# Patient Record
Sex: FEMALE | Race: WHITE | URBAN - METROPOLITAN AREA
[De-identification: names, ages, dates, MRNs, and addresses within clinical notes are randomized per-mention and may not be internally consistent; named-entity substitution may affect disease eponyms.]

---

## 2017-03-29 ENCOUNTER — ANESTHESIA (OUTPATIENT)
Dept: SURGERY | Facility: CLINIC | Age: 37
DRG: 853 | End: 2017-03-29
Payer: COMMERCIAL

## 2017-03-29 ENCOUNTER — ANESTHESIA EVENT (OUTPATIENT)
Dept: SURGERY | Facility: CLINIC | Age: 37
DRG: 853 | End: 2017-03-29
Payer: COMMERCIAL

## 2017-03-29 ENCOUNTER — HOSPITAL ENCOUNTER (INPATIENT)
Facility: CLINIC | Age: 37
LOS: 15 days | Discharge: HOME | DRG: 853 | End: 2017-04-13
Attending: EMERGENCY MEDICINE | Admitting: SURGERY
Payer: COMMERCIAL

## 2017-03-29 ENCOUNTER — APPOINTMENT (OUTPATIENT)
Dept: CT IMAGING | Facility: CLINIC | Age: 37
DRG: 853 | End: 2017-03-29
Attending: EMERGENCY MEDICINE
Payer: COMMERCIAL

## 2017-03-29 ENCOUNTER — APPOINTMENT (OUTPATIENT)
Dept: GENERAL RADIOLOGY | Facility: CLINIC | Age: 37
DRG: 853 | End: 2017-03-29
Attending: ANESTHESIOLOGY
Payer: COMMERCIAL

## 2017-03-29 DIAGNOSIS — E11.9 TYPE 2 DIABETES MELLITUS WITHOUT COMPLICATION, WITH LONG-TERM CURRENT USE OF INSULIN (H): ICD-10-CM

## 2017-03-29 DIAGNOSIS — M72.6 NECROTIZING FASCIITIS (H): ICD-10-CM

## 2017-03-29 DIAGNOSIS — R73.9 HYPERGLYCEMIA: ICD-10-CM

## 2017-03-29 DIAGNOSIS — M79.89 NECROTIZING SOFT TISSUE INFECTION: Primary | ICD-10-CM

## 2017-03-29 DIAGNOSIS — I10 ESSENTIAL HYPERTENSION: ICD-10-CM

## 2017-03-29 DIAGNOSIS — Z79.4 TYPE 2 DIABETES MELLITUS WITHOUT COMPLICATION, WITH LONG-TERM CURRENT USE OF INSULIN (H): ICD-10-CM

## 2017-03-29 LAB
ALBUMIN SERPL-MCNC: 1.7 G/DL (ref 3.4–5)
ALBUMIN SERPL-MCNC: 2.3 G/DL (ref 3.4–5)
ALP SERPL-CCNC: 57 U/L (ref 40–150)
ALP SERPL-CCNC: 83 U/L (ref 40–150)
ALT SERPL W P-5'-P-CCNC: 11 U/L (ref 0–50)
ALT SERPL W P-5'-P-CCNC: 18 U/L (ref 0–50)
ANION GAP SERPL CALCULATED.3IONS-SCNC: 7 MMOL/L (ref 3–14)
ANION GAP SERPL CALCULATED.3IONS-SCNC: 8 MMOL/L (ref 3–14)
ANION GAP SERPL CALCULATED.3IONS-SCNC: 9 MMOL/L (ref 3–14)
ANION GAP SERPL CALCULATED.3IONS-SCNC: 9 MMOL/L (ref 3–14)
APTT PPP: 28 SEC (ref 22–37)
AST SERPL W P-5'-P-CCNC: 10 U/L (ref 0–45)
AST SERPL W P-5'-P-CCNC: 8 U/L (ref 0–45)
BASE DEFICIT BLDA-SCNC: 2.4 MMOL/L
BASE DEFICIT BLDV-SCNC: 1 MMOL/L
BASOPHILS # BLD AUTO: 0 10E9/L (ref 0–0.2)
BASOPHILS NFR BLD AUTO: 0.1 %
BILIRUB SERPL-MCNC: 0.7 MG/DL (ref 0.2–1.3)
BILIRUB SERPL-MCNC: 0.9 MG/DL (ref 0.2–1.3)
BUN SERPL-MCNC: 3 MG/DL (ref 7–30)
BUN SERPL-MCNC: 4 MG/DL (ref 7–30)
BUN SERPL-MCNC: 5 MG/DL (ref 7–30)
BUN SERPL-MCNC: 6 MG/DL (ref 7–30)
CA-I BLD-MCNC: 4.1 MG/DL (ref 4.4–5.2)
CA-I BLD-MCNC: 4.2 MG/DL (ref 4.4–5.2)
CALCIUM SERPL-MCNC: 6.7 MG/DL (ref 8.5–10.1)
CALCIUM SERPL-MCNC: 6.8 MG/DL (ref 8.5–10.1)
CALCIUM SERPL-MCNC: 7 MG/DL (ref 8.5–10.1)
CALCIUM SERPL-MCNC: 7.9 MG/DL (ref 8.5–10.1)
CHLORIDE SERPL-SCNC: 105 MMOL/L (ref 94–109)
CHLORIDE SERPL-SCNC: 108 MMOL/L (ref 94–109)
CHLORIDE SERPL-SCNC: 114 MMOL/L (ref 94–109)
CHLORIDE SERPL-SCNC: 95 MMOL/L (ref 94–109)
CO2 SERPL-SCNC: 23 MMOL/L (ref 20–32)
CO2 SERPL-SCNC: 23 MMOL/L (ref 20–32)
CO2 SERPL-SCNC: 24 MMOL/L (ref 20–32)
CO2 SERPL-SCNC: 26 MMOL/L (ref 20–32)
CREAT SERPL-MCNC: 0.48 MG/DL (ref 0.52–1.04)
CREAT SERPL-MCNC: 0.52 MG/DL (ref 0.52–1.04)
CREAT SERPL-MCNC: 0.58 MG/DL (ref 0.52–1.04)
CREAT SERPL-MCNC: 0.66 MG/DL (ref 0.52–1.04)
DIFFERENTIAL METHOD BLD: ABNORMAL
EOSINOPHIL # BLD AUTO: 0.1 10E9/L (ref 0–0.7)
EOSINOPHIL NFR BLD AUTO: 0.3 %
ERYTHROCYTE [DISTWIDTH] IN BLOOD BY AUTOMATED COUNT: 12.7 % (ref 10–15)
ERYTHROCYTE [DISTWIDTH] IN BLOOD BY AUTOMATED COUNT: 12.8 % (ref 10–15)
ERYTHROCYTE [DISTWIDTH] IN BLOOD BY AUTOMATED COUNT: 12.8 % (ref 10–15)
ERYTHROCYTE [DISTWIDTH] IN BLOOD BY AUTOMATED COUNT: 13 % (ref 10–15)
FIBRINOGEN PPP-MCNC: 688 MG/DL (ref 200–420)
GFR SERPL CREATININE-BSD FRML MDRD: ABNORMAL ML/MIN/1.7M2
GLUCOSE BLDC GLUCOMTR-MCNC: 176 MG/DL (ref 70–99)
GLUCOSE BLDC GLUCOMTR-MCNC: 215 MG/DL (ref 70–99)
GLUCOSE BLDC GLUCOMTR-MCNC: 225 MG/DL (ref 70–99)
GLUCOSE BLDC GLUCOMTR-MCNC: 244 MG/DL (ref 70–99)
GLUCOSE BLDC GLUCOMTR-MCNC: 267 MG/DL (ref 70–99)
GLUCOSE SERPL-MCNC: 166 MG/DL (ref 70–99)
GLUCOSE SERPL-MCNC: 213 MG/DL (ref 70–99)
GLUCOSE SERPL-MCNC: 218 MG/DL (ref 70–99)
GLUCOSE SERPL-MCNC: 356 MG/DL (ref 70–99)
GRAM STN SPEC: ABNORMAL
HBA1C MFR BLD: 9.6 % (ref 4.3–6)
HCG SERPL QL: NEGATIVE
HCO3 BLD-SCNC: 23 MMOL/L (ref 21–28)
HCO3 BLDV-SCNC: 25 MMOL/L (ref 21–28)
HCT VFR BLD AUTO: 31.5 % (ref 35–47)
HCT VFR BLD AUTO: 32.1 % (ref 35–47)
HCT VFR BLD AUTO: 32.9 % (ref 35–47)
HCT VFR BLD AUTO: 39.6 % (ref 35–47)
HGB BLD-MCNC: 10.8 G/DL (ref 11.7–15.7)
HGB BLD-MCNC: 10.9 G/DL (ref 11.7–15.7)
HGB BLD-MCNC: 11.4 G/DL (ref 11.7–15.7)
HGB BLD-MCNC: 13.9 G/DL (ref 11.7–15.7)
IMM GRANULOCYTES # BLD: 0.1 10E9/L (ref 0–0.4)
IMM GRANULOCYTES NFR BLD: 0.5 %
INR PPP: 1.29 (ref 0.86–1.14)
LACTATE BLD-SCNC: 0.8 MMOL/L (ref 0.7–2.1)
LACTATE BLD-SCNC: 1 MMOL/L (ref 0.7–2.1)
LACTATE BLD-SCNC: 1.9 MMOL/L (ref 0.7–2.1)
LACTATE SERPL-SCNC: 1.3 MMOL/L (ref 0.4–2)
LYMPHOCYTES # BLD AUTO: 1.8 10E9/L (ref 0.8–5.3)
LYMPHOCYTES NFR BLD AUTO: 10.6 %
Lab: ABNORMAL
MAGNESIUM SERPL-MCNC: 1.8 MG/DL (ref 1.6–2.3)
MCH RBC QN AUTO: 28.5 PG (ref 26.5–33)
MCH RBC QN AUTO: 28.6 PG (ref 26.5–33)
MCH RBC QN AUTO: 28.7 PG (ref 26.5–33)
MCH RBC QN AUTO: 28.9 PG (ref 26.5–33)
MCHC RBC AUTO-ENTMCNC: 34 G/DL (ref 31.5–36.5)
MCHC RBC AUTO-ENTMCNC: 34.3 G/DL (ref 31.5–36.5)
MCHC RBC AUTO-ENTMCNC: 34.7 G/DL (ref 31.5–36.5)
MCHC RBC AUTO-ENTMCNC: 35.1 G/DL (ref 31.5–36.5)
MCV RBC AUTO: 82 FL (ref 78–100)
MCV RBC AUTO: 83 FL (ref 78–100)
MCV RBC AUTO: 83 FL (ref 78–100)
MCV RBC AUTO: 84 FL (ref 78–100)
MICRO REPORT STATUS: ABNORMAL
MONOCYTES # BLD AUTO: 1.4 10E9/L (ref 0–1.3)
MONOCYTES NFR BLD AUTO: 7.8 %
NEUTROPHILS # BLD AUTO: 13.9 10E9/L (ref 1.6–8.3)
NEUTROPHILS NFR BLD AUTO: 80.7 %
NRBC # BLD AUTO: 0 10*3/UL
NRBC BLD AUTO-RTO: 0 /100
O2/TOTAL GAS SETTING VFR VENT: ABNORMAL %
OXYHGB MFR BLD: 94 % (ref 92–100)
OXYHGB MFR BLDV: 79 %
PCO2 BLD: 41 MM HG (ref 35–45)
PCO2 BLDV: 47 MM HG (ref 40–50)
PH BLD: 7.36 PH (ref 7.35–7.45)
PH BLDV: 7.33 PH (ref 7.32–7.43)
PHOSPHATE SERPL-MCNC: 2.7 MG/DL (ref 2.5–4.5)
PLATELET # BLD AUTO: 244 10E9/L (ref 150–450)
PLATELET # BLD AUTO: 259 10E9/L (ref 150–450)
PLATELET # BLD AUTO: 276 10E9/L (ref 150–450)
PLATELET # BLD AUTO: 279 10E9/L (ref 150–450)
PO2 BLD: 79 MM HG (ref 80–105)
PO2 BLDV: 47 MM HG (ref 25–47)
POTASSIUM SERPL-SCNC: 3.2 MMOL/L (ref 3.4–5.3)
POTASSIUM SERPL-SCNC: 3.5 MMOL/L (ref 3.4–5.3)
POTASSIUM SERPL-SCNC: 3.9 MMOL/L (ref 3.4–5.3)
POTASSIUM SERPL-SCNC: 4.1 MMOL/L (ref 3.4–5.3)
PROT SERPL-MCNC: 5.3 G/DL (ref 6.8–8.8)
PROT SERPL-MCNC: 7.1 G/DL (ref 6.8–8.8)
RBC # BLD AUTO: 3.78 10E12/L (ref 3.8–5.2)
RBC # BLD AUTO: 3.83 10E12/L (ref 3.8–5.2)
RBC # BLD AUTO: 3.95 10E12/L (ref 3.8–5.2)
RBC # BLD AUTO: 4.84 10E12/L (ref 3.8–5.2)
SODIUM SERPL-SCNC: 130 MMOL/L (ref 133–144)
SODIUM SERPL-SCNC: 137 MMOL/L (ref 133–144)
SODIUM SERPL-SCNC: 140 MMOL/L (ref 133–144)
SODIUM SERPL-SCNC: 144 MMOL/L (ref 133–144)
SPECIMEN SOURCE: ABNORMAL
WBC # BLD AUTO: 17.2 10E9/L (ref 4–11)
WBC # BLD AUTO: 17.3 10E9/L (ref 4–11)
WBC # BLD AUTO: 17.6 10E9/L (ref 4–11)
WBC # BLD AUTO: 20.6 10E9/L (ref 4–11)

## 2017-03-29 PROCEDURE — 25000125 ZZHC RX 250: Performed by: ANESTHESIOLOGY

## 2017-03-29 PROCEDURE — 96365 THER/PROPH/DIAG IV INF INIT: CPT | Mod: 59

## 2017-03-29 PROCEDURE — 40000169 ZZH STATISTIC PRE-PROCEDURE ASSESSMENT I: Performed by: SURGERY

## 2017-03-29 PROCEDURE — 96368 THER/DIAG CONCURRENT INF: CPT

## 2017-03-29 PROCEDURE — S0077 INJECTION, CLINDAMYCIN PHOSP: HCPCS | Performed by: EMERGENCY MEDICINE

## 2017-03-29 PROCEDURE — 25000128 H RX IP 250 OP 636: Performed by: NURSE ANESTHETIST, CERTIFIED REGISTERED

## 2017-03-29 PROCEDURE — 82330 ASSAY OF CALCIUM: CPT | Performed by: PHYSICIAN ASSISTANT

## 2017-03-29 PROCEDURE — 3E043XZ INTRODUCTION OF VASOPRESSOR INTO CENTRAL VEIN, PERCUTANEOUS APPROACH: ICD-10-PCS | Performed by: PHYSICIAN ASSISTANT

## 2017-03-29 PROCEDURE — 25000131 ZZH RX MED GY IP 250 OP 636 PS 637: Performed by: NURSE ANESTHETIST, CERTIFIED REGISTERED

## 2017-03-29 PROCEDURE — 40000940 XR CHEST PORT 1 VW

## 2017-03-29 PROCEDURE — 87077 CULTURE AEROBIC IDENTIFY: CPT | Performed by: SURGERY

## 2017-03-29 PROCEDURE — 25000128 H RX IP 250 OP 636: Performed by: PHYSICIAN ASSISTANT

## 2017-03-29 PROCEDURE — 85384 FIBRINOGEN ACTIVITY: CPT | Performed by: ANESTHESIOLOGY

## 2017-03-29 PROCEDURE — 72193 CT PELVIS W/DYE: CPT

## 2017-03-29 PROCEDURE — 83605 ASSAY OF LACTIC ACID: CPT | Performed by: EMERGENCY MEDICINE

## 2017-03-29 PROCEDURE — 27210794 ZZH OR GENERAL SUPPLY STERILE: Performed by: SURGERY

## 2017-03-29 PROCEDURE — 82533 TOTAL CORTISOL: CPT | Performed by: PHYSICIAN ASSISTANT

## 2017-03-29 PROCEDURE — 80053 COMPREHEN METABOLIC PANEL: CPT | Performed by: ANESTHESIOLOGY

## 2017-03-29 PROCEDURE — 84100 ASSAY OF PHOSPHORUS: CPT | Performed by: ANESTHESIOLOGY

## 2017-03-29 PROCEDURE — 40000008 ZZH STATISTIC AIRWAY CARE

## 2017-03-29 PROCEDURE — 80048 BASIC METABOLIC PNL TOTAL CA: CPT | Performed by: SURGERY

## 2017-03-29 PROCEDURE — 25000125 ZZHC RX 250: Performed by: PHYSICIAN ASSISTANT

## 2017-03-29 PROCEDURE — S0077 INJECTION, CLINDAMYCIN PHOSP: HCPCS | Performed by: SURGERY

## 2017-03-29 PROCEDURE — 40000275 ZZH STATISTIC RCP TIME EA 10 MIN

## 2017-03-29 PROCEDURE — 83605 ASSAY OF LACTIC ACID: CPT | Performed by: ANESTHESIOLOGY

## 2017-03-29 PROCEDURE — 25000132 ZZH RX MED GY IP 250 OP 250 PS 637: Performed by: PHYSICIAN ASSISTANT

## 2017-03-29 PROCEDURE — 25000131 ZZH RX MED GY IP 250 OP 636 PS 637: Performed by: PHYSICIAN ASSISTANT

## 2017-03-29 PROCEDURE — 36000052 ZZH SURGERY LEVEL 2 EA 15 ADDTL MIN: Performed by: SURGERY

## 2017-03-29 PROCEDURE — 25000125 ZZHC RX 250: Performed by: SURGERY

## 2017-03-29 PROCEDURE — P9041 ALBUMIN (HUMAN),5%, 50ML: HCPCS | Performed by: NURSE ANESTHETIST, CERTIFIED REGISTERED

## 2017-03-29 PROCEDURE — 85027 COMPLETE CBC AUTOMATED: CPT | Performed by: ANESTHESIOLOGY

## 2017-03-29 PROCEDURE — 25000125 ZZHC RX 250: Performed by: NURSE ANESTHETIST, CERTIFIED REGISTERED

## 2017-03-29 PROCEDURE — 87185 SC STD ENZYME DETCJ PER NZM: CPT | Performed by: SURGERY

## 2017-03-29 PROCEDURE — 25000125 ZZHC RX 250

## 2017-03-29 PROCEDURE — 87102 FUNGUS ISOLATION CULTURE: CPT | Performed by: SURGERY

## 2017-03-29 PROCEDURE — 37000009 ZZH ANESTHESIA TECHNICAL FEE, EACH ADDTL 15 MIN: Performed by: SURGERY

## 2017-03-29 PROCEDURE — 25000566 ZZH SEVOFLURANE, EA 15 MIN: Performed by: SURGERY

## 2017-03-29 PROCEDURE — 25000128 H RX IP 250 OP 636

## 2017-03-29 PROCEDURE — 00000146 ZZHCL STATISTIC GLUCOSE BY METER IP

## 2017-03-29 PROCEDURE — 87070 CULTURE OTHR SPECIMN AEROBIC: CPT | Performed by: SURGERY

## 2017-03-29 PROCEDURE — 87075 CULTR BACTERIA EXCEPT BLOOD: CPT | Performed by: SURGERY

## 2017-03-29 PROCEDURE — 37000008 ZZH ANESTHESIA TECHNICAL FEE, 1ST 30 MIN: Performed by: SURGERY

## 2017-03-29 PROCEDURE — 87641 MR-STAPH DNA AMP PROBE: CPT | Performed by: PHYSICIAN ASSISTANT

## 2017-03-29 PROCEDURE — 25000128 H RX IP 250 OP 636: Performed by: SURGERY

## 2017-03-29 PROCEDURE — 85610 PROTHROMBIN TIME: CPT | Performed by: ANESTHESIOLOGY

## 2017-03-29 PROCEDURE — 96375 TX/PRO/DX INJ NEW DRUG ADDON: CPT

## 2017-03-29 PROCEDURE — 96361 HYDRATE IV INFUSION ADD-ON: CPT

## 2017-03-29 PROCEDURE — 36000050 ZZH SURGERY LEVEL 2 1ST 30 MIN: Performed by: SURGERY

## 2017-03-29 PROCEDURE — 25000128 H RX IP 250 OP 636: Performed by: EMERGENCY MEDICINE

## 2017-03-29 PROCEDURE — 99207 ZZC NON-BILLABLE SERV PER CHARTING: CPT | Performed by: PHYSICIAN ASSISTANT

## 2017-03-29 PROCEDURE — 71000013 ZZH RECOVERY PHASE 1 LEVEL 1 EA ADDTL HR: Performed by: SURGERY

## 2017-03-29 PROCEDURE — 25500064 ZZH RX 255 OP 636: Performed by: EMERGENCY MEDICINE

## 2017-03-29 PROCEDURE — 96376 TX/PRO/DX INJ SAME DRUG ADON: CPT

## 2017-03-29 PROCEDURE — 82805 BLOOD GASES W/O2 SATURATION: CPT | Performed by: ANESTHESIOLOGY

## 2017-03-29 PROCEDURE — 25000132 ZZH RX MED GY IP 250 OP 250 PS 637: Performed by: NURSE ANESTHETIST, CERTIFIED REGISTERED

## 2017-03-29 PROCEDURE — 83735 ASSAY OF MAGNESIUM: CPT | Performed by: ANESTHESIOLOGY

## 2017-03-29 PROCEDURE — 36415 COLL VENOUS BLD VENIPUNCTURE: CPT

## 2017-03-29 PROCEDURE — 96366 THER/PROPH/DIAG IV INF ADDON: CPT

## 2017-03-29 PROCEDURE — 80053 COMPREHEN METABOLIC PANEL: CPT | Performed by: EMERGENCY MEDICINE

## 2017-03-29 PROCEDURE — 27210995 ZZH RX 272: Performed by: SURGERY

## 2017-03-29 PROCEDURE — 82805 BLOOD GASES W/O2 SATURATION: CPT | Performed by: PHYSICIAN ASSISTANT

## 2017-03-29 PROCEDURE — 85730 THROMBOPLASTIN TIME PARTIAL: CPT | Performed by: ANESTHESIOLOGY

## 2017-03-29 PROCEDURE — 85027 COMPLETE CBC AUTOMATED: CPT | Performed by: PHYSICIAN ASSISTANT

## 2017-03-29 PROCEDURE — 99285 EMERGENCY DEPT VISIT HI MDM: CPT | Mod: 25

## 2017-03-29 PROCEDURE — 0JBL0ZZ EXCISION OF RIGHT UPPER LEG SUBCUTANEOUS TISSUE AND FASCIA, OPEN APPROACH: ICD-10-PCS | Performed by: SURGERY

## 2017-03-29 PROCEDURE — 87040 BLOOD CULTURE FOR BACTERIA: CPT | Performed by: EMERGENCY MEDICINE

## 2017-03-29 PROCEDURE — 87640 STAPH A DNA AMP PROBE: CPT | Performed by: PHYSICIAN ASSISTANT

## 2017-03-29 PROCEDURE — 25800025 ZZH RX 258: Performed by: ANESTHESIOLOGY

## 2017-03-29 PROCEDURE — 5A1955Z RESPIRATORY VENTILATION, GREATER THAN 96 CONSECUTIVE HOURS: ICD-10-PCS | Performed by: SURGERY

## 2017-03-29 PROCEDURE — 87176 TISSUE HOMOGENIZATION CULTR: CPT | Performed by: SURGERY

## 2017-03-29 PROCEDURE — 83036 HEMOGLOBIN GLYCOSYLATED A1C: CPT | Performed by: EMERGENCY MEDICINE

## 2017-03-29 PROCEDURE — 20000003 ZZH R&B ICU

## 2017-03-29 PROCEDURE — 87076 CULTURE ANAEROBE IDENT EACH: CPT | Performed by: SURGERY

## 2017-03-29 PROCEDURE — 87205 SMEAR GRAM STAIN: CPT | Performed by: SURGERY

## 2017-03-29 PROCEDURE — 99291 CRITICAL CARE FIRST HOUR: CPT | Performed by: PHYSICIAN ASSISTANT

## 2017-03-29 PROCEDURE — 84703 CHORIONIC GONADOTROPIN ASSAY: CPT | Performed by: EMERGENCY MEDICINE

## 2017-03-29 PROCEDURE — 11005 DBRDMT SKIN ABDOMINAL WALL: CPT | Performed by: SURGERY

## 2017-03-29 PROCEDURE — 85025 COMPLETE CBC W/AUTO DIFF WBC: CPT | Performed by: EMERGENCY MEDICINE

## 2017-03-29 PROCEDURE — 25000125 ZZHC RX 250: Performed by: EMERGENCY MEDICINE

## 2017-03-29 PROCEDURE — 36415 COLL VENOUS BLD VENIPUNCTURE: CPT | Performed by: PHYSICIAN ASSISTANT

## 2017-03-29 PROCEDURE — 83605 ASSAY OF LACTIC ACID: CPT | Performed by: PHYSICIAN ASSISTANT

## 2017-03-29 PROCEDURE — 71000012 ZZH RECOVERY PHASE 1 LEVEL 1 FIRST HR: Performed by: SURGERY

## 2017-03-29 PROCEDURE — 25800025 ZZH RX 258: Performed by: NURSE ANESTHETIST, CERTIFIED REGISTERED

## 2017-03-29 PROCEDURE — 94002 VENT MGMT INPAT INIT DAY: CPT

## 2017-03-29 PROCEDURE — 80048 BASIC METABOLIC PNL TOTAL CA: CPT | Performed by: PHYSICIAN ASSISTANT

## 2017-03-29 RX ORDER — SODIUM CHLORIDE, SODIUM LACTATE, POTASSIUM CHLORIDE, CALCIUM CHLORIDE 600; 310; 30; 20 MG/100ML; MG/100ML; MG/100ML; MG/100ML
INJECTION, SOLUTION INTRAVENOUS CONTINUOUS
Status: DISCONTINUED | OUTPATIENT
Start: 2017-03-29 | End: 2017-03-29

## 2017-03-29 RX ORDER — FENTANYL CITRATE 50 UG/ML
25-50 INJECTION, SOLUTION INTRAMUSCULAR; INTRAVENOUS
Status: DISCONTINUED | OUTPATIENT
Start: 2017-03-29 | End: 2017-03-29 | Stop reason: ALTCHOICE

## 2017-03-29 RX ORDER — CLINDAMYCIN PHOSPHATE 900 MG/50ML
900 INJECTION, SOLUTION INTRAVENOUS EVERY 8 HOURS
Status: DISCONTINUED | OUTPATIENT
Start: 2017-03-29 | End: 2017-03-29

## 2017-03-29 RX ORDER — FENTANYL CITRATE 0.05 MG/ML
25-50 INJECTION, SOLUTION INTRAMUSCULAR; INTRAVENOUS
Status: DISCONTINUED | OUTPATIENT
Start: 2017-03-29 | End: 2017-03-29 | Stop reason: HOSPADM

## 2017-03-29 RX ORDER — PROPOFOL 10 MG/ML
INJECTION, EMULSION INTRAVENOUS PRN
Status: DISCONTINUED | OUTPATIENT
Start: 2017-03-29 | End: 2017-03-29

## 2017-03-29 RX ORDER — PIPERACILLIN SODIUM, TAZOBACTAM SODIUM 3; .375 G/15ML; G/15ML
3.38 INJECTION, POWDER, LYOPHILIZED, FOR SOLUTION INTRAVENOUS EVERY 6 HOURS
Status: DISCONTINUED | OUTPATIENT
Start: 2017-03-29 | End: 2017-03-29

## 2017-03-29 RX ORDER — MAGNESIUM SULFATE HEPTAHYDRATE 40 MG/ML
4 INJECTION, SOLUTION INTRAVENOUS EVERY 4 HOURS PRN
Status: DISCONTINUED | OUTPATIENT
Start: 2017-03-29 | End: 2017-04-13 | Stop reason: HOSPADM

## 2017-03-29 RX ORDER — ETOMIDATE 2 MG/ML
INJECTION INTRAVENOUS PRN
Status: DISCONTINUED | OUTPATIENT
Start: 2017-03-29 | End: 2017-03-29

## 2017-03-29 RX ORDER — ONDANSETRON 4 MG/1
4 TABLET, ORALLY DISINTEGRATING ORAL EVERY 6 HOURS PRN
Status: DISCONTINUED | OUTPATIENT
Start: 2017-03-29 | End: 2017-04-13 | Stop reason: HOSPADM

## 2017-03-29 RX ORDER — SODIUM CHLORIDE, SODIUM LACTATE, POTASSIUM CHLORIDE, CALCIUM CHLORIDE 600; 310; 30; 20 MG/100ML; MG/100ML; MG/100ML; MG/100ML
INJECTION, SOLUTION INTRAVENOUS CONTINUOUS
Status: DISCONTINUED | OUTPATIENT
Start: 2017-03-29 | End: 2017-03-29 | Stop reason: HOSPADM

## 2017-03-29 RX ORDER — SODIUM CHLORIDE, SODIUM LACTATE, POTASSIUM CHLORIDE, CALCIUM CHLORIDE 600; 310; 30; 20 MG/100ML; MG/100ML; MG/100ML; MG/100ML
INJECTION, SOLUTION INTRAVENOUS CONTINUOUS PRN
Status: DISCONTINUED | OUTPATIENT
Start: 2017-03-29 | End: 2017-03-29

## 2017-03-29 RX ORDER — PROCHLORPERAZINE MALEATE 5 MG
5-10 TABLET ORAL EVERY 6 HOURS PRN
Status: DISCONTINUED | OUTPATIENT
Start: 2017-03-29 | End: 2017-04-13 | Stop reason: HOSPADM

## 2017-03-29 RX ORDER — HEPARIN SODIUM 5000 [USP'U]/.5ML
5000 INJECTION, SOLUTION INTRAVENOUS; SUBCUTANEOUS EVERY 8 HOURS
Status: DISCONTINUED | OUTPATIENT
Start: 2017-03-29 | End: 2017-04-13 | Stop reason: HOSPADM

## 2017-03-29 RX ORDER — ACETAMINOPHEN 10 MG/ML
INJECTION, SOLUTION INTRAVENOUS PRN
Status: DISCONTINUED | OUTPATIENT
Start: 2017-03-29 | End: 2017-03-29

## 2017-03-29 RX ORDER — CLINDAMYCIN PHOSPHATE 900 MG/50ML
900 INJECTION, SOLUTION INTRAVENOUS ONCE
Status: COMPLETED | OUTPATIENT
Start: 2017-03-29 | End: 2017-03-29

## 2017-03-29 RX ORDER — PROPOFOL 10 MG/ML
5-75 INJECTION, EMULSION INTRAVENOUS CONTINUOUS
Status: DISCONTINUED | OUTPATIENT
Start: 2017-03-29 | End: 2017-04-03

## 2017-03-29 RX ORDER — HYDROMORPHONE HYDROCHLORIDE 1 MG/ML
0.5 INJECTION, SOLUTION INTRAMUSCULAR; INTRAVENOUS; SUBCUTANEOUS
Status: COMPLETED | OUTPATIENT
Start: 2017-03-29 | End: 2017-03-29

## 2017-03-29 RX ORDER — PIPERACILLIN SODIUM, TAZOBACTAM SODIUM 3; .375 G/15ML; G/15ML
3.38 INJECTION, POWDER, LYOPHILIZED, FOR SOLUTION INTRAVENOUS EVERY 6 HOURS
Status: DISCONTINUED | OUTPATIENT
Start: 2017-03-29 | End: 2017-04-06

## 2017-03-29 RX ORDER — NICOTINE POLACRILEX 4 MG
15-30 LOZENGE BUCCAL
Status: DISCONTINUED | OUTPATIENT
Start: 2017-03-29 | End: 2017-04-01 | Stop reason: ALTCHOICE

## 2017-03-29 RX ORDER — CHLORHEXIDINE GLUCONATE ORAL RINSE 1.2 MG/ML
15 SOLUTION DENTAL EVERY 12 HOURS
Status: DISCONTINUED | OUTPATIENT
Start: 2017-03-29 | End: 2017-04-05

## 2017-03-29 RX ORDER — ALBUTEROL SULFATE 90 UG/1
AEROSOL, METERED RESPIRATORY (INHALATION) PRN
Status: DISCONTINUED | OUTPATIENT
Start: 2017-03-29 | End: 2017-03-29

## 2017-03-29 RX ORDER — POTASSIUM CHLORIDE 1500 MG/1
20-40 TABLET, EXTENDED RELEASE ORAL
Status: DISCONTINUED | OUTPATIENT
Start: 2017-03-29 | End: 2017-04-13 | Stop reason: HOSPADM

## 2017-03-29 RX ORDER — DIPHENHYDRAMINE HYDROCHLORIDE 50 MG/ML
INJECTION INTRAMUSCULAR; INTRAVENOUS PRN
Status: DISCONTINUED | OUTPATIENT
Start: 2017-03-29 | End: 2017-03-29

## 2017-03-29 RX ORDER — PROPOFOL 10 MG/ML
40-50 INJECTION, EMULSION INTRAVENOUS CONTINUOUS
Status: DISCONTINUED | OUTPATIENT
Start: 2017-03-29 | End: 2017-03-29 | Stop reason: HOSPADM

## 2017-03-29 RX ORDER — MAGNESIUM HYDROXIDE 1200 MG/15ML
LIQUID ORAL PRN
Status: DISCONTINUED | OUTPATIENT
Start: 2017-03-29 | End: 2017-03-29 | Stop reason: HOSPADM

## 2017-03-29 RX ORDER — PROPOFOL 10 MG/ML
INJECTION, EMULSION INTRAVENOUS CONTINUOUS PRN
Status: DISCONTINUED | OUTPATIENT
Start: 2017-03-29 | End: 2017-03-29

## 2017-03-29 RX ORDER — NEOSTIGMINE METHYLSULFATE 1 MG/ML
VIAL (ML) INJECTION PRN
Status: DISCONTINUED | OUTPATIENT
Start: 2017-03-29 | End: 2017-03-29

## 2017-03-29 RX ORDER — NALOXONE HYDROCHLORIDE 0.4 MG/ML
.1-.4 INJECTION, SOLUTION INTRAMUSCULAR; INTRAVENOUS; SUBCUTANEOUS
Status: DISCONTINUED | OUTPATIENT
Start: 2017-03-29 | End: 2017-03-29

## 2017-03-29 RX ORDER — HYDROMORPHONE HYDROCHLORIDE 1 MG/ML
.3-.5 INJECTION, SOLUTION INTRAMUSCULAR; INTRAVENOUS; SUBCUTANEOUS
Status: DISCONTINUED | OUTPATIENT
Start: 2017-03-29 | End: 2017-03-29

## 2017-03-29 RX ORDER — ONDANSETRON 2 MG/ML
4 INJECTION INTRAMUSCULAR; INTRAVENOUS EVERY 30 MIN PRN
Status: DISCONTINUED | OUTPATIENT
Start: 2017-03-29 | End: 2017-03-29 | Stop reason: HOSPADM

## 2017-03-29 RX ORDER — FENTANYL CITRATE 50 UG/ML
50-100 INJECTION, SOLUTION INTRAMUSCULAR; INTRAVENOUS
Status: DISCONTINUED | OUTPATIENT
Start: 2017-03-29 | End: 2017-04-03 | Stop reason: DRUGHIGH

## 2017-03-29 RX ORDER — ONDANSETRON 2 MG/ML
4 INJECTION INTRAMUSCULAR; INTRAVENOUS EVERY 6 HOURS PRN
Status: DISCONTINUED | OUTPATIENT
Start: 2017-03-29 | End: 2017-04-13 | Stop reason: HOSPADM

## 2017-03-29 RX ORDER — SCOLOPAMINE TRANSDERMAL SYSTEM 1 MG/1
PATCH, EXTENDED RELEASE TRANSDERMAL PRN
Status: DISCONTINUED | OUTPATIENT
Start: 2017-03-29 | End: 2017-03-29

## 2017-03-29 RX ORDER — METOCLOPRAMIDE 5 MG/1
10 TABLET ORAL EVERY 6 HOURS PRN
Status: DISCONTINUED | OUTPATIENT
Start: 2017-03-29 | End: 2017-04-13 | Stop reason: HOSPADM

## 2017-03-29 RX ORDER — CLINDAMYCIN PHOSPHATE 900 MG/50ML
900 INJECTION, SOLUTION INTRAVENOUS EVERY 8 HOURS
Status: DISCONTINUED | OUTPATIENT
Start: 2017-03-29 | End: 2017-03-31

## 2017-03-29 RX ORDER — OXYCODONE AND ACETAMINOPHEN 5; 325 MG/1; MG/1
1-2 TABLET ORAL EVERY 4 HOURS PRN
Status: DISCONTINUED | OUTPATIENT
Start: 2017-03-29 | End: 2017-04-13 | Stop reason: HOSPADM

## 2017-03-29 RX ORDER — HYDROMORPHONE HYDROCHLORIDE 1 MG/ML
INJECTION, SOLUTION INTRAMUSCULAR; INTRAVENOUS; SUBCUTANEOUS
Status: DISCONTINUED
Start: 2017-03-29 | End: 2017-03-29 | Stop reason: HOSPADM

## 2017-03-29 RX ORDER — FENTANYL CITRATE 50 UG/ML
INJECTION, SOLUTION INTRAMUSCULAR; INTRAVENOUS
Status: COMPLETED
Start: 2017-03-29 | End: 2017-03-29

## 2017-03-29 RX ORDER — SODIUM CHLORIDE 9 MG/ML
INJECTION, SOLUTION INTRAVENOUS CONTINUOUS
Status: DISCONTINUED | OUTPATIENT
Start: 2017-03-29 | End: 2017-04-08

## 2017-03-29 RX ORDER — NALOXONE HYDROCHLORIDE 0.4 MG/ML
.1-.4 INJECTION, SOLUTION INTRAMUSCULAR; INTRAVENOUS; SUBCUTANEOUS
Status: DISCONTINUED | OUTPATIENT
Start: 2017-03-29 | End: 2017-04-13 | Stop reason: HOSPADM

## 2017-03-29 RX ORDER — AMOXICILLIN 250 MG
1-2 CAPSULE ORAL 2 TIMES DAILY PRN
Status: DISCONTINUED | OUTPATIENT
Start: 2017-03-29 | End: 2017-03-29

## 2017-03-29 RX ORDER — DEXTROSE MONOHYDRATE 25 G/50ML
25-50 INJECTION, SOLUTION INTRAVENOUS
Status: DISCONTINUED | OUTPATIENT
Start: 2017-03-29 | End: 2017-03-29 | Stop reason: HOSPADM

## 2017-03-29 RX ORDER — LIDOCAINE HYDROCHLORIDE 20 MG/ML
INJECTION, SOLUTION INFILTRATION; PERINEURAL PRN
Status: DISCONTINUED | OUTPATIENT
Start: 2017-03-29 | End: 2017-03-29

## 2017-03-29 RX ORDER — GLYCOPYRROLATE 0.2 MG/ML
INJECTION, SOLUTION INTRAMUSCULAR; INTRAVENOUS PRN
Status: DISCONTINUED | OUTPATIENT
Start: 2017-03-29 | End: 2017-03-29

## 2017-03-29 RX ORDER — IOPAMIDOL 755 MG/ML
123 INJECTION, SOLUTION INTRAVASCULAR ONCE
Status: COMPLETED | OUTPATIENT
Start: 2017-03-29 | End: 2017-03-29

## 2017-03-29 RX ORDER — ALBUMIN, HUMAN INJ 5% 5 %
SOLUTION INTRAVENOUS CONTINUOUS PRN
Status: DISCONTINUED | OUTPATIENT
Start: 2017-03-29 | End: 2017-03-29

## 2017-03-29 RX ORDER — POTASSIUM CHLORIDE 7.45 MG/ML
10 INJECTION INTRAVENOUS
Status: DISCONTINUED | OUTPATIENT
Start: 2017-03-29 | End: 2017-04-13 | Stop reason: HOSPADM

## 2017-03-29 RX ORDER — AMOXICILLIN 250 MG
1-2 CAPSULE ORAL 2 TIMES DAILY PRN
Status: DISCONTINUED | OUTPATIENT
Start: 2017-03-29 | End: 2017-04-13 | Stop reason: HOSPADM

## 2017-03-29 RX ORDER — POTASSIUM CHLORIDE 29.8 MG/ML
20 INJECTION INTRAVENOUS
Status: DISCONTINUED | OUTPATIENT
Start: 2017-03-29 | End: 2017-04-13 | Stop reason: HOSPADM

## 2017-03-29 RX ORDER — DEXTROSE MONOHYDRATE 25 G/50ML
25-50 INJECTION, SOLUTION INTRAVENOUS
Status: DISCONTINUED | OUTPATIENT
Start: 2017-03-29 | End: 2017-04-01 | Stop reason: ALTCHOICE

## 2017-03-29 RX ORDER — CEFAZOLIN SODIUM 1 G/3ML
1 INJECTION, POWDER, FOR SOLUTION INTRAMUSCULAR; INTRAVENOUS ONCE
Status: COMPLETED | OUTPATIENT
Start: 2017-03-29 | End: 2017-03-29

## 2017-03-29 RX ORDER — SODIUM CHLORIDE 9 MG/ML
INJECTION, SOLUTION INTRAVENOUS CONTINUOUS PRN
Status: DISCONTINUED | OUTPATIENT
Start: 2017-03-29 | End: 2017-03-29

## 2017-03-29 RX ORDER — ONDANSETRON 4 MG/1
4 TABLET, ORALLY DISINTEGRATING ORAL EVERY 30 MIN PRN
Status: DISCONTINUED | OUTPATIENT
Start: 2017-03-29 | End: 2017-03-29 | Stop reason: HOSPADM

## 2017-03-29 RX ORDER — SODIUM CHLORIDE 9 MG/ML
1000 INJECTION, SOLUTION INTRAVENOUS CONTINUOUS
Status: DISCONTINUED | OUTPATIENT
Start: 2017-03-29 | End: 2017-03-29

## 2017-03-29 RX ORDER — NICOTINE POLACRILEX 4 MG
15-30 LOZENGE BUCCAL
Status: DISCONTINUED | OUTPATIENT
Start: 2017-03-29 | End: 2017-03-29 | Stop reason: HOSPADM

## 2017-03-29 RX ORDER — MEPERIDINE HYDROCHLORIDE 25 MG/ML
12.5 INJECTION INTRAMUSCULAR; INTRAVENOUS; SUBCUTANEOUS EVERY 5 MIN PRN
Status: DISCONTINUED | OUTPATIENT
Start: 2017-03-29 | End: 2017-03-29 | Stop reason: HOSPADM

## 2017-03-29 RX ORDER — FENTANYL CITRATE 50 UG/ML
INJECTION, SOLUTION INTRAMUSCULAR; INTRAVENOUS PRN
Status: DISCONTINUED | OUTPATIENT
Start: 2017-03-29 | End: 2017-03-29

## 2017-03-29 RX ORDER — ONDANSETRON 4 MG/1
4 TABLET, ORALLY DISINTEGRATING ORAL EVERY 6 HOURS PRN
Status: DISCONTINUED | OUTPATIENT
Start: 2017-03-29 | End: 2017-03-29

## 2017-03-29 RX ORDER — ALBUTEROL SULFATE 0.83 MG/ML
2.5 SOLUTION RESPIRATORY (INHALATION)
Status: DISCONTINUED | OUTPATIENT
Start: 2017-03-29 | End: 2017-03-29 | Stop reason: HOSPADM

## 2017-03-29 RX ORDER — PROCHLORPERAZINE 25 MG
25 SUPPOSITORY, RECTAL RECTAL EVERY 12 HOURS PRN
Status: DISCONTINUED | OUTPATIENT
Start: 2017-03-29 | End: 2017-04-13 | Stop reason: HOSPADM

## 2017-03-29 RX ORDER — ONDANSETRON 2 MG/ML
4 INJECTION INTRAMUSCULAR; INTRAVENOUS EVERY 6 HOURS PRN
Status: DISCONTINUED | OUTPATIENT
Start: 2017-03-29 | End: 2017-03-29

## 2017-03-29 RX ORDER — METOCLOPRAMIDE HYDROCHLORIDE 5 MG/ML
10 INJECTION INTRAMUSCULAR; INTRAVENOUS EVERY 6 HOURS PRN
Status: DISCONTINUED | OUTPATIENT
Start: 2017-03-29 | End: 2017-04-13 | Stop reason: HOSPADM

## 2017-03-29 RX ORDER — ONDANSETRON 2 MG/ML
INJECTION INTRAMUSCULAR; INTRAVENOUS PRN
Status: DISCONTINUED | OUTPATIENT
Start: 2017-03-29 | End: 2017-03-29

## 2017-03-29 RX ORDER — PROPOFOL 10 MG/ML
10-20 INJECTION, EMULSION INTRAVENOUS EVERY 30 MIN PRN
Status: DISCONTINUED | OUTPATIENT
Start: 2017-03-29 | End: 2017-04-08

## 2017-03-29 RX ORDER — POTASSIUM CHLORIDE 1.5 G/1.58G
20-40 POWDER, FOR SOLUTION ORAL
Status: DISCONTINUED | OUTPATIENT
Start: 2017-03-29 | End: 2017-04-13 | Stop reason: HOSPADM

## 2017-03-29 RX ADMIN — FENTANYL CITRATE 50 MCG: 50 INJECTION, SOLUTION INTRAMUSCULAR; INTRAVENOUS at 15:58

## 2017-03-29 RX ADMIN — SCOPALAMINE 1 PATCH: 1 PATCH, EXTENDED RELEASE TRANSDERMAL at 12:44

## 2017-03-29 RX ADMIN — FENTANYL CITRATE 50 MCG: 50 INJECTION, SOLUTION INTRAMUSCULAR; INTRAVENOUS at 12:59

## 2017-03-29 RX ADMIN — CLINDAMYCIN PHOSPHATE 900 MG: 18 INJECTION, SOLUTION INTRAVENOUS at 19:59

## 2017-03-29 RX ADMIN — HYDROMORPHONE HYDROCHLORIDE 0.5 MG: 1 INJECTION, SOLUTION INTRAMUSCULAR; INTRAVENOUS; SUBCUTANEOUS at 11:39

## 2017-03-29 RX ADMIN — SUCCINYLCHOLINE CHLORIDE 130 MG: 20 INJECTION, SOLUTION INTRAMUSCULAR; INTRAVENOUS at 12:59

## 2017-03-29 RX ADMIN — INSULIN HUMAN 6 UNITS: 100 INJECTION, SOLUTION PARENTERAL at 13:10

## 2017-03-29 RX ADMIN — GLYCOPYRROLATE 0.8 MG: 0.2 INJECTION, SOLUTION INTRAMUSCULAR; INTRAVENOUS at 13:59

## 2017-03-29 RX ADMIN — IOPAMIDOL 123 ML: 755 INJECTION, SOLUTION INTRAVENOUS at 11:06

## 2017-03-29 RX ADMIN — PHENYLEPHRINE HYDROCHLORIDE 100 MCG: 10 INJECTION, SOLUTION INTRAMUSCULAR; INTRAVENOUS; SUBCUTANEOUS at 14:34

## 2017-03-29 RX ADMIN — SODIUM CHLORIDE: 9 INJECTION, SOLUTION INTRAVENOUS at 14:14

## 2017-03-29 RX ADMIN — PROPOFOL 50 MCG/KG/MIN: 10 INJECTION, EMULSION INTRAVENOUS at 19:17

## 2017-03-29 RX ADMIN — SODIUM CHLORIDE 1000 ML: 9 INJECTION, SOLUTION INTRAVENOUS at 12:03

## 2017-03-29 RX ADMIN — Medication 50 MCG: at 19:25

## 2017-03-29 RX ADMIN — ALBUTEROL SULFATE 8 PUFF: 90 AEROSOL, METERED RESPIRATORY (INHALATION) at 13:05

## 2017-03-29 RX ADMIN — SODIUM CHLORIDE 11 UNITS/HR: 9 INJECTION, SOLUTION INTRAVENOUS at 19:35

## 2017-03-29 RX ADMIN — PIPERACILLIN SODIUM,TAZOBACTAM SODIUM 3.38 G: 3; .375 INJECTION, POWDER, FOR SOLUTION INTRAVENOUS at 18:21

## 2017-03-29 RX ADMIN — FENTANYL CITRATE 50 MCG: 50 INJECTION, SOLUTION INTRAMUSCULAR; INTRAVENOUS at 17:26

## 2017-03-29 RX ADMIN — SODIUM CHLORIDE, POTASSIUM CHLORIDE, SODIUM LACTATE AND CALCIUM CHLORIDE: 600; 310; 30; 20 INJECTION, SOLUTION INTRAVENOUS at 13:15

## 2017-03-29 RX ADMIN — ROCURONIUM BROMIDE 50 MG: 10 INJECTION INTRAVENOUS at 13:06

## 2017-03-29 RX ADMIN — SODIUM CHLORIDE: 9 INJECTION, SOLUTION INTRAVENOUS at 17:10

## 2017-03-29 RX ADMIN — SODIUM CHLORIDE 1000 ML: 9 INJECTION, SOLUTION INTRAVENOUS at 19:26

## 2017-03-29 RX ADMIN — HYDROMORPHONE HYDROCHLORIDE 0.5 MG: 1 INJECTION, SOLUTION INTRAMUSCULAR; INTRAVENOUS; SUBCUTANEOUS at 10:18

## 2017-03-29 RX ADMIN — POTASSIUM CHLORIDE 20 MEQ: 29.8 INJECTION, SOLUTION INTRAVENOUS at 20:29

## 2017-03-29 RX ADMIN — ONDANSETRON 8 MG: 2 INJECTION INTRAMUSCULAR; INTRAVENOUS at 13:53

## 2017-03-29 RX ADMIN — PHENYLEPHRINE HYDROCHLORIDE 100 MCG: 10 INJECTION, SOLUTION INTRAMUSCULAR; INTRAVENOUS; SUBCUTANEOUS at 14:38

## 2017-03-29 RX ADMIN — CHLORHEXIDINE GLUCONATE 15 ML: 1.2 RINSE ORAL at 22:38

## 2017-03-29 RX ADMIN — SODIUM CHLORIDE, POTASSIUM CHLORIDE, SODIUM LACTATE AND CALCIUM CHLORIDE: 600; 310; 30; 20 INJECTION, SOLUTION INTRAVENOUS at 12:46

## 2017-03-29 RX ADMIN — SODIUM CHLORIDE 7 UNITS/HR: 9 INJECTION, SOLUTION INTRAVENOUS at 23:20

## 2017-03-29 RX ADMIN — PROPOFOL 50 MCG/KG/MIN: 10 INJECTION, EMULSION INTRAVENOUS at 15:10

## 2017-03-29 RX ADMIN — LIDOCAINE HYDROCHLORIDE 100 MG: 20 INJECTION, SOLUTION INFILTRATION; PERINEURAL at 12:59

## 2017-03-29 RX ADMIN — SODIUM CHLORIDE 1000 ML: 9 INJECTION, SOLUTION INTRAVENOUS at 10:19

## 2017-03-29 RX ADMIN — ACETAMINOPHEN 1000 MG: 10 INJECTION, SOLUTION INTRAVENOUS at 13:43

## 2017-03-29 RX ADMIN — SODIUM CHLORIDE 1000 ML: 9 INJECTION, SOLUTION INTRAVENOUS at 18:25

## 2017-03-29 RX ADMIN — PHENYLEPHRINE HYDROCHLORIDE 100 MCG: 10 INJECTION, SOLUTION INTRAMUSCULAR; INTRAVENOUS; SUBCUTANEOUS at 14:59

## 2017-03-29 RX ADMIN — PROPOFOL 200 MG: 10 INJECTION, EMULSION INTRAVENOUS at 12:59

## 2017-03-29 RX ADMIN — PROPOFOL 25 MCG/KG/MIN: 10 INJECTION, EMULSION INTRAVENOUS at 13:34

## 2017-03-29 RX ADMIN — FENTANYL CITRATE 150 MCG: 50 INJECTION, SOLUTION INTRAMUSCULAR; INTRAVENOUS at 13:22

## 2017-03-29 RX ADMIN — POTASSIUM CHLORIDE 20 MEQ: 29.8 INJECTION, SOLUTION INTRAVENOUS at 21:31

## 2017-03-29 RX ADMIN — CEFAZOLIN SODIUM 1 G: 1 INJECTION, POWDER, FOR SOLUTION INTRAMUSCULAR; INTRAVENOUS at 10:19

## 2017-03-29 RX ADMIN — Medication 0.3 MCG/KG/MIN: at 15:30

## 2017-03-29 RX ADMIN — DIPHENHYDRAMINE HYDROCHLORIDE 12.5 MG: 50 INJECTION, SOLUTION INTRAMUSCULAR; INTRAVENOUS at 13:56

## 2017-03-29 RX ADMIN — VANCOMYCIN HYDROCHLORIDE 1500 MG: 5 INJECTION, POWDER, LYOPHILIZED, FOR SOLUTION INTRAVENOUS at 21:29

## 2017-03-29 RX ADMIN — PROPOFOL 65 MCG/KG/MIN: 10 INJECTION, EMULSION INTRAVENOUS at 21:21

## 2017-03-29 RX ADMIN — SODIUM CHLORIDE 75 ML: 9 INJECTION, SOLUTION INTRAVENOUS at 11:06

## 2017-03-29 RX ADMIN — ETOMIDATE 18 MG: 2 INJECTION, SOLUTION INTRAVENOUS at 12:59

## 2017-03-29 RX ADMIN — VANCOMYCIN HYDROCHLORIDE 2000 MG: 10 INJECTION, POWDER, LYOPHILIZED, FOR SOLUTION INTRAVENOUS at 12:01

## 2017-03-29 RX ADMIN — SODIUM CHLORIDE 1000 ML: 9 INJECTION, SOLUTION INTRAVENOUS at 10:42

## 2017-03-29 RX ADMIN — NEOSTIGMINE METHYLSULFATE 5 MG: 1 INJECTION INTRAMUSCULAR; INTRAVENOUS; SUBCUTANEOUS at 13:59

## 2017-03-29 RX ADMIN — FENTANYL CITRATE 50 MCG: 50 INJECTION, SOLUTION INTRAMUSCULAR; INTRAVENOUS at 12:49

## 2017-03-29 RX ADMIN — PIPERACILLIN SODIUM,TAZOBACTAM SODIUM 3.38 G: 3; .375 INJECTION, POWDER, FOR SOLUTION INTRAVENOUS at 12:05

## 2017-03-29 RX ADMIN — PHENYLEPHRINE HYDROCHLORIDE 100 MCG: 10 INJECTION, SOLUTION INTRAMUSCULAR; INTRAVENOUS; SUBCUTANEOUS at 14:44

## 2017-03-29 RX ADMIN — ALBUMIN (HUMAN): 12.5 SOLUTION INTRAVENOUS at 14:51

## 2017-03-29 RX ADMIN — MIDAZOLAM HYDROCHLORIDE 2 MG: 1 INJECTION, SOLUTION INTRAMUSCULAR; INTRAVENOUS at 12:46

## 2017-03-29 RX ADMIN — DEXMEDETOMIDINE 16 MCG: 100 INJECTION, SOLUTION, CONCENTRATE INTRAVENOUS at 14:17

## 2017-03-29 RX ADMIN — FENTANYL CITRATE 25 MCG/HR: 50 INJECTION, SOLUTION INTRAMUSCULAR; INTRAVENOUS at 19:08

## 2017-03-29 RX ADMIN — HUMAN INSULIN 2 UNITS/HR: 100 INJECTION, SOLUTION SUBCUTANEOUS at 15:28

## 2017-03-29 RX ADMIN — PHENYLEPHRINE HYDROCHLORIDE 100 MCG: 10 INJECTION, SOLUTION INTRAMUSCULAR; INTRAVENOUS; SUBCUTANEOUS at 14:41

## 2017-03-29 RX ADMIN — SODIUM CHLORIDE, POTASSIUM CHLORIDE, SODIUM LACTATE AND CALCIUM CHLORIDE: 600; 310; 30; 20 INJECTION, SOLUTION INTRAVENOUS at 15:14

## 2017-03-29 RX ADMIN — CLINDAMYCIN PHOSPHATE 900 MG: 18 INJECTION, SOLUTION INTRAVENOUS at 11:37

## 2017-03-29 RX ADMIN — INSULIN HUMAN 4 UNITS: 100 INJECTION, SOLUTION PARENTERAL at 13:56

## 2017-03-29 RX ADMIN — SODIUM CHLORIDE, POTASSIUM CHLORIDE, SODIUM LACTATE AND CALCIUM CHLORIDE: 600; 310; 30; 20 INJECTION, SOLUTION INTRAVENOUS at 15:52

## 2017-03-29 ASSESSMENT — COPD QUESTIONNAIRES: COPD: 0

## 2017-03-29 ASSESSMENT — ACTIVITIES OF DAILY LIVING (ADL)
SWALLOWING: 0-->SWALLOWS FOODS/LIQUIDS WITHOUT DIFFICULTY
TRANSFERRING: 0-->INDEPENDENT
DRESS: 0-->INDEPENDENT
RETIRED_COMMUNICATION: 0-->UNDERSTANDS/COMMUNICATES WITHOUT DIFFICULTY
AMBULATION: 0-->INDEPENDENT
COGNITION: 0 - NO COGNITION ISSUES REPORTED
RETIRED_EATING: 0-->INDEPENDENT
TOILETING: 0-->INDEPENDENT
FALL_HISTORY_WITHIN_LAST_SIX_MONTHS: NO
BATHING: 0-->INDEPENDENT

## 2017-03-29 ASSESSMENT — ENCOUNTER SYMPTOMS
SEIZURES: 0
CHILLS: 1
FEVER: 1
DYSRHYTHMIAS: 0

## 2017-03-29 ASSESSMENT — LIFESTYLE VARIABLES: TOBACCO_USE: 0

## 2017-03-29 NOTE — IP AVS SNAPSHOT
` ` Patient Information     Patient Name Sex     Caro Landis ([id number]) Female 1980       Room Bed    5527 5527-02      Patient Demographics     Address Phone    [address]  [place] [address] [phone] (Home)  [phone] (Mobile)      Patient Ethnicity & Race     Ethnic Group Patient Race    American White      PCP and Center    Primary Care Provider  Phone Center     Barbara Allan [phone] [place] [facility] [address] [place] [facility]        Emergency Contact(s)     Name Relation Home Work Mobile    JAMES HARVEY [phone]      Desmond Harvey Brother   [phone]    Julio Landis Friend         Documents on File        Status Date Received Description       Documents for the Patient    Affiliate Privacy placeholder   phase3    Consent for EHR Access Received 17     Insurance Card Received 17     External Medication Information Consent       Patient ID Received 17     [facility] Specified Other       Consent for Services/Privacy Notice - Hospital/Clinic Received 17     Privacy Notice - [place] Received 17        Documents for the Encounter    CMS IM for Patient Signature       MAR/Medication List  17 DOWNTIME MAR    CE Point of Care Auth Received        Admission Information     Attending Provider Admitting Provider Admission Type Admission Date/Time    Mary Beth Neal MD Wildenberg, Sara, MD Emergency 17  0925    Discharge Date Hospital Service Auth/Cert Status Service Area     Surgery [facility] SERVICES    Unit Room/Bed Admission Status       [address] UNIT 5527/5527-02 Admission (Confirmed)       Admission     Complaint    RIGHT GROIN SOFT TISSUE INFECTION, Necrotizing fasciitis (H), Necrotizing soft tissue infection (H), RIGHT GROIN WOUND       Hospital Account     Name Acct ID Class Status Primary Coverage    Caro Landis [id number] Inpatient [facility]             Guarantor Account (for Hospital Account #[id number])     Name Relation to Pt Service Area Active? Acct Type    Caro Landis Self FCS Yes Personal/Family    Address Phone          [address] [place] [address] [phone](H)              Coverage Information (for Hospital Account #[id number])     F/O Payor/Plan Precert #    [employer]     Subscriber Subscriber #    Caro Landis [id number]    Address Phone    [address]  [place] [address] [phone]

## 2017-03-29 NOTE — PROGRESS NOTES
Telephone report given to Station ICU RN.  Patient will be transported to . [facility] via cart accompanied by RT and RN.

## 2017-03-29 NOTE — H&P
[facility]    History and Physical/ Consult  Critical Care Service     Date of Admission:  3/29/2017  Date of Service (when I saw the patient): 03/29/17    Assessment & Plan   Caro Landis is a 36 year old female with PMHx of PCOS and HLD who presents to [facility] ED on 3/19 with a swollen painful R groin found to have necrotizing fasciitis now s/p debridement in the OR. The patient remains critically ill and hypotensive from presumed septic shock.     Neuro  1. Acute pain  2. Sedation  Plan:  -- fentanyl bumps for pain management 25-50mcg/1hr   -- Propofol for sedation as needed until extubation    CV  1. Hypotension, likely septic shock in setting of necrotizing fascitis  2. Hypertriglyceridemia, likely related to PCOS  Plan:  -- Start NE for blood pressure support  -- fluid resuscitation with IVF  -- trend LA Q2 hours and CBC/BMP Q4 hours to help direct therapy   -- check central VBG    Resp:  1. Acute respiratory failure, post operatively secondary to acute illness. Remains intubated post operatively for airway protection.   Plan:  -- AC ventilation   -- PST when hemodynamically stable    GI/Nutrition  1. No prior hx  Plan:  -- NPO  -- PPI    Renal  1. Hyponatremia. Typically occurs with severe necrotizing fascitis   2. No prior hx.  Baseline appears to be 0.6  Plan:  -- Monitor Cr  -- wong for strict I&Os  -- check a random cortisol level     ID  1. Necrotizing fascitis of R groin now s/p debridement in the OR. Gram stain shows moderate gram positive cocci. CT scan shows extensive gas pattern in R groin suggestive of necrotizing fasciitis   1. Currently afebrile with leukocytosis of 17.2, LA 1.9 on admission   Plan:  -- continue zosyn and clindamycin for necrotizing fascitis   -- follow up blood culture results   -- will likely need further debridement in the OR  -- LA Q2 and CBC Q4    Endocrine  1. Stress Hyperglycemia, likely related to necrotizing fascitis   2. PCOS. Previously on metformin and  spironolactone   3. Impaired fasting glucose per chart review. HgbA1c 9.6 on admission   Plan:  --  Insulin gtt per protocol if indicated  -- Keep BG  <180 for optimal healing    Heme:  1. Acute blood loss anemia  2. Coagulopathy (heparin induced), reversed   Plan:  -- Monitor hemoglobin.  -- Transfuse to keep > 7.0    MSK  1. Necrotizing fascitis, R leg and groin  Plan:  -- management plan per above     Skin  1. Redness of the face without apparent angioedema, some swelling of eyelids  2. Wound on R groin, necrotizing fascitis   Plan:  -- wound care nurse consult   -- will continue to monitor facial rash     General cares:  DVT Prophylaxis: Heparin SQ to start tomorrow   GI Prophylaxis: PPI  Restraints: Restraints for medical healing needed: NO  Family update by me today: No  Current lines are required for patient management  Access:    Rosario Ward    Time Spent on this Encounter   Billing:  I spent 60 minutes bedside and on the inpatient unit today managing the critical care of Caro Landis in relation to the issues listed in this note.    Code Status   Full Code    Primary Care Physician   [facility]    Chief Complaint   Painful R groin     History is obtained from the patient and electronic health record    History of Present Illness   Caro Landis is a 36 year old female with PMHx of PCOS and HLD who presents to [facility] ED on 3/19 with a swollen painful R groin found to have necrotizing fasciitis. Per care everywhere, the patient was seen in the [facility] today complaining of a swollen painful lump in her R groin along with a fever, aches and fatigue. She was then transferred to [facility] ED for further evaluation. On admission to ED, the patient was afebrile, with a HR of 104, SBPs in the 140s-150s, saturating well on room air. Her labs were remarkable for a sodium of 130, glucose 225, WBC 17.2, ANC 13.9, and monocytes of 1.4. CT groin shows diffuse gas pattern in her R groin  suggestive of necrotizing fasciitis. She was given vanc, zosyn and clinda. She was taken emergently to the OR for debridement.     Post-operatively, the patient has remained intubated and critically ill. She will be transferred to the ICU for further stabilization and management.     Past Medical History    I have reviewed this patient's medical history and updated it with pertinent information if needed.   Past Medical History:   Diagnosis Date     Polycystic disease, ovaries        Past Surgical History   I have reviewed this patient's surgical history and updated it with pertinent information if needed.  Past Surgical History:   Procedure Laterality Date     APPENDECTOMY         Prior to Admission Medications   Prior to Admission Medications   Prescriptions Last Dose Informant Patient Reported? Taking?   Acetaminophen (TYLENOL PO) prn Self Yes Yes   Sig: Take 325 mg by mouth every 6 hours as needed for mild pain or fever   Naproxen Sodium (ALEVE PO) Past Week at Unknown time Self Yes Yes   Sig: Take 220 mg by mouth 2 times daily as needed for moderate pain      Facility-Administered Medications: None     Allergies   Allergies   Allergen Reactions     Cats      Shellfish-Derived Products        Social History   Social history is limited given patient's current state. Patient works at a home . Unable to assess other social history     Family History   I have reviewed this patient's family history and updated it with pertinent information if needed.   History reviewed. No pertinent family history.    Review of Systems   ROS cannot be performed as the patient is sedated and intubated     Physical Exam   Temp: 98.5  F (36.9  C) Temp src: Temporal Temp  Min: 98.4  F (36.9  C)  Max: 98.5  F (36.9  C) BP: (!) 146/92 Pulse: 102 Heart Rate: 102 Resp: 18 SpO2: 96 % O2 Device: None (Room air)    Vital Signs with Ranges  Temp:  [98.4  F (36.9  C)-98.5  F (36.9  C)] 98.5  F (36.9  C)  Pulse:  [102] 102  Heart Rate:   [102-104] 102  Resp:  [18] 18  BP: (146-158)/(83-92) 146/92  SpO2:  [96 %-97 %] 96 %  252 lbs 0 oz    Constitutional:  female, laying in bed, sedated   HEENT: atraumatic, normocephalic, pupils equal and reactive, face is red in color, possible edema in the eyes but no edema in the lips   Respiratory: CTAB, no wheezes, rales or rhonchi   Cardiovascular: RRR, normal S1 and S2, no murmur   GI: abdomen is obese, soft, non-tender   Genitourinary: wong in place, urine is yellow   Skin: surgical area is covered, bandages are clean and dry  Musculoskeletal: extremities are warm and pink, no pedal edema   Neurologic: sedated on exam    Data   Results for orders placed or performed during the hospital encounter of 03/29/17 (from the past 24 hour(s))   CBC with platelets differential   Result Value Ref Range    WBC 17.2 (H) 4.0 - 11.0 10e9/L    RBC Count 4.84 3.8 - 5.2 10e12/L    Hemoglobin 13.9 11.7 - 15.7 g/dL    Hematocrit 39.6 35.0 - 47.0 %    MCV 82 78 - 100 fl    MCH 28.7 26.5 - 33.0 pg    MCHC 35.1 31.5 - 36.5 g/dL    RDW 12.7 10.0 - 15.0 %    Platelet Count 279 150 - 450 10e9/L    Diff Method Automated Method     % Neutrophils 80.7 %    % Lymphocytes 10.6 %    % Monocytes 7.8 %    % Eosinophils 0.3 %    % Basophils 0.1 %    % Immature Granulocytes 0.5 %    Nucleated RBCs 0 0 /100    Absolute Neutrophil 13.9 (H) 1.6 - 8.3 10e9/L    Absolute Lymphocytes 1.8 0.8 - 5.3 10e9/L    Absolute Monocytes 1.4 (H) 0.0 - 1.3 10e9/L    Absolute Eosinophils 0.1 0.0 - 0.7 10e9/L    Absolute Basophils 0.0 0.0 - 0.2 10e9/L    Abs Immature Granulocytes 0.1 0 - 0.4 10e9/L    Absolute Nucleated RBC 0.0    Comprehensive metabolic panel   Result Value Ref Range    Sodium 130 (L) 133 - 144 mmol/L    Potassium 3.9 3.4 - 5.3 mmol/L    Chloride 95 94 - 109 mmol/L    Carbon Dioxide 26 20 - 32 mmol/L    Anion Gap 9 3 - 14 mmol/L    Glucose 356 (H) 70 - 99 mg/dL    Urea Nitrogen 6 (L) 7 - 30 mg/dL    Creatinine 0.66 0.52 - 1.04 mg/dL     GFR Estimate >90  Non  GFR Calc   >60 mL/min/1.7m2    GFR Estimate If Black >90   GFR Calc   >60 mL/min/1.7m2    Calcium 7.9 (L) 8.5 - 10.1 mg/dL    Bilirubin Total 0.9 0.2 - 1.3 mg/dL    Albumin 2.3 (L) 3.4 - 5.0 g/dL    Protein Total 7.1 6.8 - 8.8 g/dL    Alkaline Phosphatase 83 40 - 150 U/L    ALT 18 0 - 50 U/L    AST 8 0 - 45 U/L   Lactic acid whole blood   Result Value Ref Range    Lactic Acid 1.9 0.7 - 2.1 mmol/L   HCG QUALitative pregnancy (blood)   Result Value Ref Range    HCG Qualitative Serum Negative NEG   Hemoglobin A1c   Result Value Ref Range    Hemoglobin A1C 9.6 (H) 4.3 - 6.0 %   CT Pelvis w Contrast    Narrative    CT PELVIS WITH CONTRAST 3/29/2017 11:08 AM    TECHNIQUE: Images from iliac crest to mid femurs with 123 mL  Isovue-370 IV contrast.  Radiation dose for this scan was reduced using automated exposure  control, adjustment of the mA and/or kV according to patient size, or  iterative reconstruction technique.    HISTORY: Abscess right groin with cellulitis, evaluate depth and  question of gas.    COMPARISON: None.    FINDINGS:   Pelvis: Extensive gas in the soft tissues of the anterior and medial  proximal right thigh with a more focal fluid collection compatible  with abscess along its medial aspect at the level of the labia. The  fluid collection consistent with abscess measures approximately 5.2 cm  AP, 3.6 cm transverse and 5.1 cm craniocaudal dimension. The soft  tissue gas extends anterior and medial as well as inferior to this  level and the gas in the soft tissues is approximately 11.6 cm AP  oblique x 3 cm transverse diameter. The gas in the anterior thigh  approximately 3 cm craniocaudal dimension. More diffuse subcutaneous  fat stranding and skin thickening is identified. Findings compatible  with necrotizing fasciitis. Results telephoned to Dr. Shala Marte at  11:20 AM on 3/29/2017.      Impression    IMPRESSION: Extensive soft tissue gas and  abscess as described in the  anterior and medial proximal right thigh compatible with necrotizing  fasciitis. There is some inflammation extending into the perineal  region.                Gram stain   Result Value Ref Range    Specimen Description Pending     Gram Stain (A)      Moderate Gram positive cocci  Many PMNs seen  CALLLED TO [facility] IN OR AT 1430 3.29.2017 SO      Micro Report Status Pending    Gram stain   Result Value Ref Range    Specimen Description Pending     Gram Stain (A)      Many Gram positive cocci  Many PMNs seen  CALLED TO [facility] IN OR AT 1415.SO 3.29.2017  Gram stain result is preliminary and awaits review of Microbiology Staff.      Micro Report Status Pending    Gram stain   Result Value Ref Range    Specimen Description Pending     Gram Stain (A)      Moderate Gram positive cocci  Moderate PMNs seen  CALLED TO [facility] IN OR 30 AT 14.15.SO 3.29.2017  Gram stain result is preliminary and awaits review of Microbiology Staff.      Micro Report Status Pending    Glucose by meter   Result Value Ref Range    Glucose 225 (H) 70 - 99 mg/dL

## 2017-03-29 NOTE — IP AVS SNAPSHOT
[address] Specialty Unit    [address] [place] [address]    Phone:  [phone]                                       After Visit Summary   3/29/2017    Caro Landis    MRN: [id number]           After Visit Summary Signature Page     I have received my discharge instructions, and my questions have been answered. I have discussed any challenges I see with this plan with the nurse or doctor.    ..........................................................................................................................................  Patient/Patient Representative Signature      ..........................................................................................................................................  Patient Representative Print Name and Relationship to Patient    ..................................................               ................................................  Date                                            Time    ..........................................................................................................................................  Reviewed by Signature/Title    ...................................................              ..............................................  Date                                                            Time

## 2017-03-29 NOTE — PROGRESS NOTES
Consulted for management of diabetes. This is a 36 year old female who is s/p excisional debridement of the right groin due to necrotizing soft tissue infection. Surgery performed by Dr. Neal. General anesthesia used. EBL 50 ccs. Appears to be a newly diagnosed diabetic with A1C of 9.6. Pt still intubated and on ventilator. At this time, will defer diabetes management to intensivist service; once extubated, more than happy to take over with diabetes management. Appreciate consultation.

## 2017-03-29 NOTE — ED PROVIDER NOTES
History     Chief Complaint:  Inguinal tender mass and fever     HPI   Caro Landis is a 36 year old female who presents with a right groin mass, that she noted last Friday. The mass is painful, has increased from a 1x2 in size to a to a size of 2 x 5 in approximately, and it follows the anterior inguinal fold. The overlying skin is erythematous. She noted a low grade fever last Saturday and some chills. She has not had much of an appetite. She denies history of DM or MRSA infection. She shaves her inguinal area. She works in .     Allergies:  The patient has no known drug allergies.      Medications:    The patient has no known drug allergies.     Past Medical History:    History reviewed.  No significant past medical history.    No past medical history on file.    Past Surgical History:    History reviewed.  No significant past surgical history.      Family History:    DM     Social History:  Marital Status:   [2]  She is accompanied by her mother.     Review of Systems   Constitutional: Positive for chills and fever.   Skin:        Positive for right inguinal mass with overlying skin erythema and warmth   All other systems reviewed and are negative.      Physical Exam   First Vitals:  BP: 158/83  Heart Rate: 104  Temp: 98.4  F (36.9  C)  Resp: 18  Weight: 114.3 kg (252 lb)  SpO2: 97 %      Physical Exam  HEENT: normal.  LN: no adenopathy in the neck.   Lungs: clear to auscultation.  CV: normal without murmurs or gallops. Good radial pulses. Good peripheral pulses in lower extremities.   Abdomen: soft, nontender.   Skin: she has an area of erythema, warmth, tenderness and induration over the right proximal medial thigh that extends into the groin crease. There is a definite area of abscess that measures 4 x 2 in with some fluctuance, woody texture and an area of swelling just distal on the anterior thigh. Further beyond that there is erythema. No crepitus that I can obviously feel, but I cannot  push hard because she is in so much pain. There is no drainage or break in the skin.   Neurologic: awake and alert. Normal sensation in lower extremity.    Emergency Department Course     Imaging:  Radiology findings were communicated with the patient who voiced understanding of the findings.    CT Pelvis with contrast, per radiology:    Extensive soft tissue gas and abscess as described in the anterior and medial proximal right thigh compatible with necrotizing fasciitis. There is some inflammation extending into the perineal  region.      Laboratory:  Laboratory findings were communicated with the patient who voiced understanding of the findings.  CBC: WBC 17.2, HGB 13.9,   CMP: Na 130, , BUN 6, Creatinine 0.66, Ca 7.9, Alb 2.3  HbA1c: 9.6  HCG Qualitative: negative   Blood cultures x2: Pending  Lactic Acid: 1.9     Interventions:  1018: Dilaudid 0.5 mg, IV  1019, 1042, 1203: NS 1,000 mL, IV x3  1019: Cefazolin 1 g, IV   1137: Clindamycin 900 mg, IV  1139: Dilaudid 0.5 mg, IV  1201: Vancomycin 2,000 mg, IV      Emergency Department Course:  Nursing notes and vitals reviewed.  I performed an exam of the patient as documented above.   The patient was placed on continuous pulse oximetry and cardiac monitoring.   A peripheral IV was established and blood was drawn for laboratory testing, results above}.  Pelvic CT was obtained while in the emergency department, findings above.      1130, I discussed the patient with Dr. Neal, General Surgery.     I rechecked the patient and the findings were explained to her and her mother, who consent to admission. I discussed the patient with Dr. Neal, who will admit the patient for further monitoring, evaluation and treatment.    I personally reviewed the findings with the patient and answered all related questions prior to admission.    Impression & Plan      Medical Decision Making:  Patient has very concerning findings on exam with this extensive  cellulitis and abscess. It came on fairly quickly in the last couple of days. She is not a known diabetes, her laboratories came back showing an elevated GLC of 356 mg/dL. I did add a HbA1c which is pending, but she likely has underlying DM. Lactic acid is 1.9, WBC is up at 17.2 with a left shift and her CMP is essentially normal. The Na is artificially low due to the elevated GLC. blood cultures were obtained and she was started on ancef initially. 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 I then added vancomycin and clindamycin to cover anaerobes and MRSA. I talked to general surgery and the surgeon is here evaluating the patient, planning on taking her to the OR. The patient has not eaten today. Will continue fluids, she is hemodynamically stable. She will need management of her DM as well as extensive debridement and antibiotics. The patient understands the severity and seriousness of her illness.    Diagnosis:    ICD-10-CM   1. Necrotizing fasciitis, groin M72.6   2. Hyperglycemia R73.9     Disposition:   Admit to the surgeon to go to the OR, antibiotics, ID consultation, cultures will be obtained.    Scribe Disclosure:  NADJA, Blanca Guillory, am serving as a scribe at 11:59 AM on 3/29/2017 to document services personally performed by Shala Marte MD, based on my observations and the provider's statements to me.      EMERGENCY DEPARTMENT       Shala Marte MD  03/29/17 0176

## 2017-03-29 NOTE — PHARMACY-ADMISSION MEDICATION HISTORY
Admission medication history interview status for the 3/29/2017  admission is complete. See EPIC admission navigator for prior to admission medications     Medication history source reliability:Good    Actions taken by pharmacist (provider contacted, etc):None     Additional medication history information not noted on PTA med list :None    Medication reconciliation/reorder completed by provider prior to medication history? No    Time spent in this activity: 15 minutes    Prior to Admission medications    Medication Sig Last Dose Taking? Auth Provider   Acetaminophen (TYLENOL PO) Take 325 mg by mouth every 6 hours as needed for mild pain or fever prn Yes Unknown, Entered By History   Naproxen Sodium (ALEVE PO) Take 220 mg by mouth 2 times daily as needed for moderate pain Past Week at Unknown time Yes Unknown, Entered By History     Dorota Chao, PharmD

## 2017-03-29 NOTE — PROGRESS NOTES
ET tube pulled back 2 CM and secured 22 CM at lip per MD order. BBS equal and clear.  3/29/2017  Boby Randall

## 2017-03-29 NOTE — H&P
[facility] General Surgery Consultation    Caro Landis MRN# [id number]   YOB: 1980 Age: 36 year old      Date of Admission:  3/29/2017  Date of Consult: 3/29/2017         Assessment and Plan:   Patient is a 36 year old female with a necrotizing soft tissue infection in her right groin. Her CT shows air and fluid with significant surrounding inflammatory changes in the right upper thigh and groin. She has received zosyn, vanco and clindamycin in the Emergency department. She also has severe hyperglycemia without prior history of diabetes. We have had a lengthy discussion regarding the details of surgery and that she will have a large wound in her right groin which may require return trips to the operating room and bedside dressing changes.     PLAN:  To OR for emergent excisional debridement and culture  Admit to ICU postoperatively  Moseley to be placed in OR  Consult hospitalist for management of hyperglycemia         Requesting Physician:      Dr. Marte        Chief Complaint:     Chief Complaint   Patient presents with     Leg Pain     right inner thigh pain since Friday- pt states there is a mass that has been growing with increased pain and fevers.           History of Present Illness:   Caro Landis is a 36 year old female who was seen in consultation at the request of Dr. Marte who presented with right groin pain and swelling. She reports she had pain on Friday in her right groin. No trauma to the area. On Saturday she noticed a red bump on the right upper inner thigh. This has progressed to a large area of swelling which is exquisitely tender. She denies history of similar problems. She denies history of diabetes but her blood sugar is elevated to 356 today. Her WBC is 17.2. She is also neutropenic.             Physical Exam:   Blood pressure 153/87, temperature 98.4  F (36.9  C), temperature source Oral, resp. rate 18, weight 252 lb (114.3 kg), SpO2 96 %.  252 lbs 0  oz  General: appears ill  Psych: Alert and Oriented.  Normal affect  Neurological: grossly intact  Eyes: Sclera clear  Respiratory:  nonlabored breathing  Cardiovascular:  Regular Rate and Rhythm   GI: abd soft, nt, nd   Lymphatic/Hematologic/Immune:  No femoral or cervical lymphadenopathy.  Integumentary:  Right inner upper thigh - large area of erythema and induration which extends to right labia with palpable fluctuance. Very tender to palpation. No pain with palpation of buttock         Past Medical History:   none         Past Surgical History:   Appendectomy at 16yo         Current Medications:           clindamycin  900 mg Intravenous Once     vancomycin (VANCOCIN) IV  2,000 mg Intravenous Once                Home Medications:     Prior to Admission medications    Medication Sig Last Dose Taking? Auth Provider   Acetaminophen (TYLENOL PO) Take 325 mg by mouth every 6 hours as needed for mild pain or fever prn Yes Unknown, Entered By History   Naproxen Sodium (ALEVE PO) Take 220 mg by mouth 2 times daily as needed for moderate pain Past Week at Unknown time Yes Unknown, Entered By History            Allergies:     Allergies   Allergen Reactions     Cats      Shellfish-Derived Products             Family History:   reviewed        Social History:   Caro Landis    Smokes 1/2 ppd. Denies Etoh. No other drug use. Runs a .  and here with mother.         Review of Systems:   The 10 point Review of Systems is negative other than noted in the HPI.         Labs/Imaging   All new lab and imaging data was reviewed.   I have personally reviewed the imaging studies  .  TIME SPENT:  40 minutes was spent with patient and greater than 50% of the time was spent counseling and coordination of care.      Mary Beth Neal MD

## 2017-03-29 NOTE — ANESTHESIA CARE TRANSFER NOTE
Patient: Caro Landis    Procedure(s):  EXCISIONAL DEBRIDEMENT OF RIGHT GROIN NECROTIZING TISSUE INFECTION - Wound Class: IV-Dirty or Infected    Diagnosis: Nectrotizing Fascitis  Diagnosis Additional Information: No value filed.    Anesthesia Type:   General, ETT, RSI     Note:  Airway :ETT  Patient transferred to:PACU        Vitals: (Last set prior to Anesthesia Care Transfer)    CRNA VITALS  3/29/2017 1431 - 3/29/2017 1514      3/29/2017             Pulse: 76    ART BP: 103/50    ART Mean: 66    SpO2: 95 %    Resp Rate (observed): 24                Electronically Signed By: MARTHA Rosario CRNA  March 29, 2017  3:14 PM

## 2017-03-29 NOTE — IP AVS SNAPSHOT
MRN:[id number]                      After Visit Summary   3/29/2017    Caro Landis    MRN: [id number]           Thank you!     Thank you for choosing [facility] for your care. Our goal is always to provide you with excellent care. Hearing back from our patients is one way we can continue to improve our services. Please take a few minutes to complete the written survey that you may receive in the mail after you visit with us. Thank you!        Patient Information     Date Of Birth          1980        Designated Caregiver       Most Recent Value    Caregiver    Will someone help with your care after discharge? no [needs SNF]      About your hospital stay     You were admitted on:  March 29, 2017 You last received care in the:  [address] Special[facility] Unit    You were discharged on:  April 13, 2017        Reason for your hospital stay       Infection of your right groin, diabetes, high blood pressure                  Who to Call     For medical emergencies, please call 911.  For non-urgent questions about your medical care, please call your primary care provider or clinic, [phone]  For questions related to your surgery, please call your surgery clinic        Attending Provider     Provider Specialty    Shala Marte MD Emergency Medicine    [place], Wilder Joseph MD Anesthesiology    Mary Beth Neal MD Surgery       Primary Care Provider Office Phone # Fax #    Barbara N Allan [phone]       [facility] [place] [address] [place] [address]        After Care Instructions     Activity       Your activity upon discharge: activity as tolerated. No heavy lifting > 20 lbs or strenuous exercise x 2-3 weeks. No driving or alcohol while on pain meds.            Diet       Follow this diet upon discharge: moderate carbohydrate diet            Discharge Equipment: Wound Vac       Negative Pressure Wound Therapy to r groin wound with continuous  suction.     Cleanse wound with saline or wound cleanser. Dressing change 3 times per week. Change canister weekly and when full.     Supplies: black foam    Teach pt/family how to remove dressing and apply wet to damp dressing, in the event that dressing leaks or machine malfunctions.  Consult [facility] nurse.      Follow-up for wound care in 2 weeks with Dr. Neal.            Wound care and dressings       Instructions to care for your wound at home: Wound vac changes Monday, Wednesday and Friday                  Follow-up Appointments     Follow-up and recommended labs and tests        Follow up with me,  Mary Beth Neal, Friday, April 21st for wound check. Please call [phone] to schedule this appt. Please bring wound vac supplies with you if possible.                  Additional Services     Home Care PT Referral for Hospital Discharge       PT to eval and treat    Your provider has ordered home care - physical therapy. If you have not been contacted within 2 days of your discharge please call the department phone number listed on the top of this document.            Home care nursing referral       RN skilled nursing visit. RN to provide wound vac dressing changes, see directions included. Assess ability to manage new diagnosis of DM.    Your provider has ordered home care nursing services. If you have not been contacted within 2 days of your discharge please call the inpatient department phone number at [phone] .            Wound Care Referral       Wound vac placement for thigh wound. Evaluate and treat as per protocol and see other vac/wound care orders. Thank you.                  Further instructions from your care team       Rt groin I & D site:  (dressing last changed on 4-11-17)  [facility] Wound VAC  Change dressing on M-W-F and prn  Vac @ 125mm/hg cont suction  Pre-medicate pt with oral pain meds prior to dressing change    Supplies:   Medium black sponge kit  Antifungal powder  Skin prep  Wound  "cleanser    Procedure:    1.      Clean wound MicroKlenz   2.     Antifungal powder  (Nystatin Powder or Desenex) to emre-wound skin. Dust off excess   3.     Sealed in with skin prep   4.     Black  sponge to tunnel and undermining   5.     Black sponge x 2 to fill in remainder wound bed   6.     Sealed with Vac drape   7.       Adaptor bridged and and padded on abd wall    Home Care provided by:[facility]  Phone [phone]  Fax#[phone]    Follow up appointment with Dr. Hedrick on Friday, April 14th at 4:15pm for a 4:30pm appointment at your [facility]. [phone]  Follow up with  Wound Clinic appointment on Monday @ 2:15pm (This is required for the home care agency to do the dressing changes on the wound vac).  Address: [address] [place] [address], Ground floor rehab services  Phone: [phone]        Pending Results     Date and Time Order Name Status Description    3/29/2017 1333 Fungus Culture, non-blood Preliminary     3/29/2017 1327 Fungus Culture, non-blood Preliminary     3/29/2017 1324 Fungus Culture, non-blood Preliminary             Statement of Approval     Ordered          04/12/17 0939  I have reviewed and agree with all the recommendations and orders detailed in this document.  EFFECTIVE NOW     Approved and electronically signed by:  Jama Milligan MD             Admission Information     Date & Time Provider Department Dept. Phone    3/29/2017 Mary Beth Neal MD [place] [address] [facility] [phone]      Your Vitals Were     Blood Pressure Pulse Temperature Respirations Height Weight    147/94 (BP Location: Right arm) 68 98.1  F (36.7  C) (Oral) 16 1.727 m (5' 8\") 112 kg (246 lb 14.6 oz)    Pulse Oximetry BMI (Body Mass Index)                97% 37.54 kg/m2          MyChart Information     MyChart lets you send messages to your doctor, view your test results, renew your prescriptions, schedule " "appointments and more. To sign up, go to [email or web address] . Click on \"Log in\" on the left side of the screen, which will take you to the Welcome page. Then click on \"Sign up Now\" on the right side of the page.     You will be asked to enter the access code listed below, as well as some personal information. Please follow the directions to create your username and password.     Your access code is: [email or web address]  Expires: 2017 10:06 AM     Your access code will  in 90 days. If you need help or a new code, please call your [facility] clinic or [phone].        Care EveryWhere ID     This is your Care EveryWhere ID. This could be used by other organizations to access your [facility] medical records  [id number]           Review of your medicines      START taking        Dose / Directions    amLODIPine 10 MG tablet   Commonly known as:  NORVASC   Used for:  Essential hypertension        Dose:  10 mg   Take 1 tablet (10 mg) by mouth daily   Quantity:  30 tablet   Refills:  1       insulin glargine 100 UNIT/ML injection   Commonly known as:  LANTUS SOLOSTAR        Dose:  8 Units   Inject 8 Units Subcutaneous At Bedtime Lantus Solostar Pen   Quantity:  15 mL   Refills:  0       metoprolol 25 MG tablet   Commonly known as:  LOPRESSOR   Used for:  Essential hypertension        Dose:  25 mg   Take 1 tablet (25 mg) by mouth 2 times daily   Quantity:  60 tablet   Refills:  1       * order for DME        Equipment being ordered: Walker Wheels () and Walker () Treatment Diagnosis: impaired gait stability   Quantity:  1 each   Refills:  0       * order for DME        Equipment being ordered: Other: Glucometer Treatment Diagnosis: Diabetes Needs testing strips for testing three times daily and control solution   Quantity:  1 Device   Refills:  0       oxyCODONE-acetaminophen 5-325 MG per tablet   Commonly known as:  PERCOCET        Dose:  1-2 tablet   Take 1-2 tablets by mouth every 4 hours as needed for " moderate to severe pain   Quantity:  20 tablet   Refills:  0       * Notice:  This list has 2 medication(s) that are the same as other medications prescribed for you. Read the directions carefully, and ask your doctor or other care provider to review them with you.      STOP taking     ALEVE PO           TYLENOL PO                Where to get your medicines      These medications were sent to [facility] [place], [place] - [address]  [address], [place] [address]     Phone:  [phone]     amLODIPine 10 MG tablet    insulin glargine 100 UNIT/ML injection    metoprolol 25 MG tablet         Some of these will need a paper prescription and others can be bought over the counter. Ask your nurse if you have questions.     Bring a paper prescription for each of these medications     order for DME    oxyCODONE-acetaminophen 5-325 MG per tablet       You don't need a prescription for these medications     order for DME                Protect others around you: Learn how to safely use, store and throw away your medicines at www.disposemymeds.org.             Medication List: This is a list of all your medications and when to take them. Check marks below indicate your daily home schedule. Keep this list as a reference.      Medications           Morning Afternoon Evening Bedtime As Needed    amLODIPine 10 MG tablet   Commonly known as:  NORVASC   Take 1 tablet (10 mg) by mouth daily   Last time this was given:  10 mg on 4/13/2017  7:43 AM   Next Dose Due:  4/14                                   insulin glargine 100 UNIT/ML injection   Commonly known as:  LANTUS SOLOSTAR   Inject 8 Units Subcutaneous At Bedtime Lantus Solostar Pen   Last time this was given:  8 Units on 4/12/2017  9:28 PM   Next Dose Due:  4/13 around 9 pm                                   metoprolol 25 MG tablet   Commonly known as:  LOPRESSOR   Take 1 tablet (25 mg) by mouth 2 times daily   Last time this was given:  25 mg on  4/13/2017  7:44 AM   Next Dose Due:  4/13                                   * order for DME   Equipment being ordered: Walker Wheels () and Walker () Treatment Diagnosis: impaired gait stability                                * order for DME   Equipment being ordered: Other: Glucometer Treatment Diagnosis: Diabetes Needs testing strips for testing three times daily and control solution                                oxyCODONE-acetaminophen 5-325 MG per tablet   Commonly known as:  PERCOCET   Take 1-2 tablets by mouth every 4 hours as needed for moderate to severe pain   Last time this was given:  1 tablet on 4/13/2017  7:43 AM   Next Dose Due:  As needed                                  * Notice:  This list has 2 medication(s) that are the same as other medications prescribed for you. Read the directions carefully, and ask your doctor or other care provider to review them with you.

## 2017-03-29 NOTE — OP NOTE
General Surgery Operative Note    PREOPERATIVE DIAGNOSIS: necrotizing soft tissue infection right groin    POSTOPERATIVE DIAGNOSIS: same    PROCEDURE: excisional debridement right groin    SURGEON:  Mary Beth Neal MD    ASSISTANT:  Sandra Delatorre PA-C  The PA s assistance was medically necessary to provide adequate exposure in the operating field, maintain hemostasis, cutting suture, clamping and ligating bleeding vessels, and visualization of anatomic structures throughout the surgical procedure.       ANESTHESIA:  General.    BLOOD LOSS: 50ml    FINDINGS: significant purulent fluid with initial incision, murky fluid and necrotic subcutaneous tissue extending laterally along anterior fascial plane consistent with necrotizing fascitis.     INDICATIONS:   Ms. Landis presented with an infection in her right groin. A CT was performed and revealed significant inflammation with a large fluid collection with air present within the fluid concerning for a necrotizing soft tissue infection. We had a lengthy discussion of the risks, benefits, indications and alternatives and she agreed to proceed.     DETAILS OF PROCEDURE: The patient was brought to the operating room per Anesthesia, placed in supine position, and intubated without difficulty. A Surgical timeout was then performed, verifying the correct surgeon, site, procedure, and patient and all in the room were in agreement. A wong catheter was placed. Anesthesia placed a radial arterial line and central line.     In the right groin there was significant erythema just inferior to the inguinal ligament and lateral to the right labia extending to the anterior superior iliac spine. An incision was made over the area of most fluctuance with immediate return of copious amounts of purulent material. Fluid was collected and sent for stat culture. We then extended the incision medially and laterally. There was a large pocket of purulence and the underlying muscular fascia was dark.  i was able to freely slide my hand along the fascia both medially and laterally with return of murky fluid. The entire area of undermining was opened. Necrotic and ischemic skin and subcutaneous tissue were sharply excised and a portion sent for tissue culture. All of the necrotic/ischemic tissue was debrided back to healthy bleeding subcutaneous tissue. The total area of excisional debridement measured 20cm x 10cm x 8cm (depth). The wound was irrigated. Hemostasis was achieved with a combination of electrocautery and suture ligature. The wound was further irrigated and packed with two moist kerlix rolls, covered with an ABD pad and secured with tape. The patient tolerated the procedure well. She will be transferred to the PACU in stable condition. Instrument, needle and sponge counts were correct at the conclusion of the procedure.      Mary Beth Neal MD

## 2017-03-29 NOTE — ANESTHESIA PREPROCEDURE EVALUATION
Anesthesia Evaluation     . Pt has had prior anesthetic. Type: General    History of anesthetic complications   - PONV        ROS/MED HX    ENT/Pulmonary:  - neg pulmonary ROS   (+)ANTOINETTE risk factors obese, , . .   (-) tobacco use, asthma, COPD and sleep apnea   Neurologic:  - neg neurologic ROS    (-) seizures and CVA   Cardiovascular:  - neg cardiovascular ROS      (-) angina, hypertension, CAD, syncope, arrhythmias, irregular heartbeat/palpitations, valvular problems/murmurs and angina   METS/Exercise Tolerance:     Hematologic:  - neg hematologic  ROS       Musculoskeletal:  - neg musculoskeletal ROS       GI/Hepatic:        (-) GERD and liver disease   Renal/Genitourinary:      (-) renal disease   Endo:     (+) type II DM (no diagnosis, likely - blood sugars 380) .      Psychiatric:         Infectious Disease:   (+) Recent Fever, Other Infectious Disease Necrotizing Fasciitis, right groin      Malignancy:      - no malignancy   Other:    - neg other ROS                 Physical Exam  Normal systems: dental    Airway   Mallampati: II  TM distance: >3 FB    Dental     Cardiovascular   Rhythm and rate: regular and normal  (-) no murmur    Pulmonary (-) no wheezes                    Anesthesia Plan      History & Physical Review  History and physical reviewed and following examination; no interval change.    ASA Status:  1 emergent.    NPO Status:  > 8 hours    Plan for General, ETT and RSI with Intravenous, Propofol and Etomidate induction. Maintenance will be Balanced.    PONV prophylaxis:  Ondansetron (or other 5HT-3), Scopolamine patch and Other (See comment) (Propofol infusion )  Additional equipment: Videolaryngoscope, Arterial Line and Central Line Scopolamine patch  Zofran 8 mg   No Decadron  Propofol infusion (if intraop condition allows)    Concern for developing sepsis with extensive nec fasc:  Will start arterial line and CVL if patient shows signs of impending danger.  Also, we will start insulin bolus  and an insulin gtt.  We will closely follow this as well.        Postoperative Care  Postoperative pain management:  IV analgesics.      Consents  Anesthetic plan, risks, benefits and alternatives discussed with:  Patient..          Procedure: Procedure(s):  EXCISE LESION GROIN  Preop diagnosis: Nectrotizing Fascitis    Allergies   Allergen Reactions     Cats      Shellfish-Derived Products      No past medical history on file.  No past surgical history on file.  Prior to Admission medications    Medication Sig Start Date End Date Taking? Authorizing Provider   Acetaminophen (TYLENOL PO) Take 325 mg by mouth every 6 hours as needed for mild pain or fever   Yes Unknown, Entered By History   Naproxen Sodium (ALEVE PO) Take 220 mg by mouth 2 times daily as needed for moderate pain   Yes Unknown, Entered By History     Current Facility-Administered Medications Ordered in Epic   Medication Dose Route Frequency Last Rate Last Dose     0.9% sodium chloride infusion  1,000 mL Intravenous Continuous   Stopped at 03/29/17 1042     clindamycin (CLEOCIN) infusion 900 mg  900 mg Intravenous Once 50 mL/hr at 03/29/17 1137 900 mg at 03/29/17 1137     vancomycin (VANCOCIN) 2,000 mg in NaCl 0.9 % 500 mL intermittent infusion  2,000 mg Intravenous Once         Current Outpatient Prescriptions Ordered in Epic   Medication     Acetaminophen (TYLENOL PO)     Naproxen Sodium (ALEVE PO)     Wt Readings from Last 1 Encounters:   03/29/17 114.3 kg (252 lb)     Temp Readings from Last 1 Encounters:   03/29/17 36.9  C (98.4  F) (Oral)     BP Readings from Last 6 Encounters:   03/29/17 153/87     Pulse Readings from Last 4 Encounters:   No data found for Pulse     Resp Readings from Last 1 Encounters:   03/29/17 18     SpO2 Readings from Last 1 Encounters:   03/29/17 96%     Recent Labs   Lab Test  03/29/17   1000   NA  130*   POTASSIUM  3.9   CHLORIDE  95   CO2  26   ANIONGAP  9   GLC  356*   BUN  6*   CR  0.66   ROBERT  7.9*     Recent Labs    Lab Test  03/29/17   1000   WBC  17.2*   HGB  13.9   PLT  279     No results for input(s): INR in the last 94971 hours.    Invalid input(s): APTT   RECENT LABS:   ECG:   ECHO:   CXR:                    .

## 2017-03-30 ENCOUNTER — ANESTHESIA (OUTPATIENT)
Dept: SURGERY | Facility: CLINIC | Age: 37
DRG: 853 | End: 2017-03-30
Payer: COMMERCIAL

## 2017-03-30 ENCOUNTER — APPOINTMENT (OUTPATIENT)
Dept: GENERAL RADIOLOGY | Facility: CLINIC | Age: 37
DRG: 853 | End: 2017-03-30
Attending: PHYSICIAN ASSISTANT
Payer: COMMERCIAL

## 2017-03-30 ENCOUNTER — OFFICE VISIT (OUTPATIENT)
Dept: SURGERY | Facility: PHYSICIAN GROUP | Age: 37
End: 2017-03-30
Payer: COMMERCIAL

## 2017-03-30 ENCOUNTER — APPOINTMENT (OUTPATIENT)
Dept: GENERAL RADIOLOGY | Facility: CLINIC | Age: 37
DRG: 853 | End: 2017-03-30
Attending: ANESTHESIOLOGY
Payer: COMMERCIAL

## 2017-03-30 ENCOUNTER — ANESTHESIA EVENT (OUTPATIENT)
Dept: SURGERY | Facility: CLINIC | Age: 37
DRG: 853 | End: 2017-03-30
Payer: COMMERCIAL

## 2017-03-30 ENCOUNTER — APPOINTMENT (OUTPATIENT)
Dept: GENERAL RADIOLOGY | Facility: CLINIC | Age: 37
DRG: 853 | End: 2017-03-30
Attending: SURGERY
Payer: COMMERCIAL

## 2017-03-30 LAB
ANION GAP SERPL CALCULATED.3IONS-SCNC: 10 MMOL/L (ref 3–14)
ANION GAP SERPL CALCULATED.3IONS-SCNC: 6 MMOL/L (ref 3–14)
ANION GAP SERPL CALCULATED.3IONS-SCNC: 7 MMOL/L (ref 3–14)
ANION GAP SERPL CALCULATED.3IONS-SCNC: 8 MMOL/L (ref 3–14)
ANION GAP SERPL CALCULATED.3IONS-SCNC: 9 MMOL/L (ref 3–14)
BUN SERPL-MCNC: 3 MG/DL (ref 7–30)
BUN SERPL-MCNC: 3 MG/DL (ref 7–30)
BUN SERPL-MCNC: 4 MG/DL (ref 7–30)
BUN SERPL-MCNC: 4 MG/DL (ref 7–30)
BUN SERPL-MCNC: 5 MG/DL (ref 7–30)
CA-I BLD-MCNC: 3.9 MG/DL (ref 4.4–5.2)
CA-I BLD-MCNC: 3.9 MG/DL (ref 4.4–5.2)
CA-I BLD-MCNC: 4 MG/DL (ref 4.4–5.2)
CA-I BLD-MCNC: 4 MG/DL (ref 4.4–5.2)
CA-I BLD-MCNC: 4.1 MG/DL (ref 4.4–5.2)
CALCIUM SERPL-MCNC: 6.8 MG/DL (ref 8.5–10.1)
CALCIUM SERPL-MCNC: 6.9 MG/DL (ref 8.5–10.1)
CALCIUM SERPL-MCNC: 6.9 MG/DL (ref 8.5–10.1)
CALCIUM SERPL-MCNC: 7.1 MG/DL (ref 8.5–10.1)
CALCIUM SERPL-MCNC: 7.2 MG/DL (ref 8.5–10.1)
CHLORIDE SERPL-SCNC: 112 MMOL/L (ref 94–109)
CHLORIDE SERPL-SCNC: 113 MMOL/L (ref 94–109)
CO2 SERPL-SCNC: 23 MMOL/L (ref 20–32)
CO2 SERPL-SCNC: 23 MMOL/L (ref 20–32)
CO2 SERPL-SCNC: 24 MMOL/L (ref 20–32)
CO2 SERPL-SCNC: 24 MMOL/L (ref 20–32)
CO2 SERPL-SCNC: 25 MMOL/L (ref 20–32)
CORTIS SERPL-MCNC: 3.9 UG/DL (ref 4–22)
CREAT SERPL-MCNC: 0.6 MG/DL (ref 0.52–1.04)
CREAT SERPL-MCNC: 0.6 MG/DL (ref 0.52–1.04)
CREAT SERPL-MCNC: 0.96 MG/DL (ref 0.52–1.04)
CREAT SERPL-MCNC: 1.12 MG/DL (ref 0.52–1.04)
CREAT SERPL-MCNC: 1.42 MG/DL (ref 0.52–1.04)
ERYTHROCYTE [DISTWIDTH] IN BLOOD BY AUTOMATED COUNT: 13.1 % (ref 10–15)
ERYTHROCYTE [DISTWIDTH] IN BLOOD BY AUTOMATED COUNT: 13.2 % (ref 10–15)
ERYTHROCYTE [DISTWIDTH] IN BLOOD BY AUTOMATED COUNT: 13.2 % (ref 10–15)
ERYTHROCYTE [DISTWIDTH] IN BLOOD BY AUTOMATED COUNT: 13.4 % (ref 10–15)
ERYTHROCYTE [DISTWIDTH] IN BLOOD BY AUTOMATED COUNT: 13.5 % (ref 10–15)
GFR SERPL CREATININE-BSD FRML MDRD: 41 ML/MIN/1.7M2
GFR SERPL CREATININE-BSD FRML MDRD: 54 ML/MIN/1.7M2
GFR SERPL CREATININE-BSD FRML MDRD: 65 ML/MIN/1.7M2
GFR SERPL CREATININE-BSD FRML MDRD: ABNORMAL ML/MIN/1.7M2
GFR SERPL CREATININE-BSD FRML MDRD: ABNORMAL ML/MIN/1.7M2
GLUCOSE BLDC GLUCOMTR-MCNC: 130 MG/DL (ref 70–99)
GLUCOSE SERPL-MCNC: 124 MG/DL (ref 70–99)
GLUCOSE SERPL-MCNC: 127 MG/DL (ref 70–99)
GLUCOSE SERPL-MCNC: 134 MG/DL (ref 70–99)
GLUCOSE SERPL-MCNC: 135 MG/DL (ref 70–99)
GLUCOSE SERPL-MCNC: 149 MG/DL (ref 70–99)
HCT VFR BLD AUTO: 32.6 % (ref 35–47)
HCT VFR BLD AUTO: 32.7 % (ref 35–47)
HCT VFR BLD AUTO: 33 % (ref 35–47)
HCT VFR BLD AUTO: 33.5 % (ref 35–47)
HCT VFR BLD AUTO: 34.2 % (ref 35–47)
HGB BLD-MCNC: 11 G/DL (ref 11.7–15.7)
HGB BLD-MCNC: 11 G/DL (ref 11.7–15.7)
HGB BLD-MCNC: 11.1 G/DL (ref 11.7–15.7)
HGB BLD-MCNC: 11.3 G/DL (ref 11.7–15.7)
HGB BLD-MCNC: 11.6 G/DL (ref 11.7–15.7)
LACTATE BLD-SCNC: 0.5 MMOL/L (ref 0.7–2.1)
LACTATE BLD-SCNC: 0.6 MMOL/L (ref 0.7–2.1)
LACTATE BLD-SCNC: 0.7 MMOL/L (ref 0.7–2.1)
LACTATE BLD-SCNC: 0.7 MMOL/L (ref 0.7–2.1)
LACTATE BLD-SCNC: 0.8 MMOL/L (ref 0.7–2.1)
MAGNESIUM SERPL-MCNC: 2 MG/DL (ref 1.6–2.3)
MAGNESIUM SERPL-MCNC: 2.1 MG/DL (ref 1.6–2.3)
MCH RBC QN AUTO: 28.6 PG (ref 26.5–33)
MCH RBC QN AUTO: 28.7 PG (ref 26.5–33)
MCH RBC QN AUTO: 28.8 PG (ref 26.5–33)
MCH RBC QN AUTO: 28.9 PG (ref 26.5–33)
MCH RBC QN AUTO: 29 PG (ref 26.5–33)
MCHC RBC AUTO-ENTMCNC: 33.3 G/DL (ref 31.5–36.5)
MCHC RBC AUTO-ENTMCNC: 33.7 G/DL (ref 31.5–36.5)
MCHC RBC AUTO-ENTMCNC: 33.7 G/DL (ref 31.5–36.5)
MCHC RBC AUTO-ENTMCNC: 33.9 G/DL (ref 31.5–36.5)
MCHC RBC AUTO-ENTMCNC: 33.9 G/DL (ref 31.5–36.5)
MCV RBC AUTO: 85 FL (ref 78–100)
MCV RBC AUTO: 86 FL (ref 78–100)
MCV RBC AUTO: 86 FL (ref 78–100)
MRSA DNA SPEC QL NAA+PROBE: NORMAL
PHOSPHATE SERPL-MCNC: 2.3 MG/DL (ref 2.5–4.5)
PHOSPHATE SERPL-MCNC: 2.7 MG/DL (ref 2.5–4.5)
PLATELET # BLD AUTO: 271 10E9/L (ref 150–450)
PLATELET # BLD AUTO: 272 10E9/L (ref 150–450)
PLATELET # BLD AUTO: 274 10E9/L (ref 150–450)
PLATELET # BLD AUTO: 285 10E9/L (ref 150–450)
PLATELET # BLD AUTO: 291 10E9/L (ref 150–450)
POTASSIUM SERPL-SCNC: 3.7 MMOL/L (ref 3.4–5.3)
POTASSIUM SERPL-SCNC: 3.8 MMOL/L (ref 3.4–5.3)
POTASSIUM SERPL-SCNC: 3.9 MMOL/L (ref 3.4–5.3)
RBC # BLD AUTO: 3.83 10E12/L (ref 3.8–5.2)
RBC # BLD AUTO: 3.84 10E12/L (ref 3.8–5.2)
RBC # BLD AUTO: 3.85 10E12/L (ref 3.8–5.2)
RBC # BLD AUTO: 3.93 10E12/L (ref 3.8–5.2)
RBC # BLD AUTO: 4 10E12/L (ref 3.8–5.2)
SODIUM SERPL-SCNC: 143 MMOL/L (ref 133–144)
SODIUM SERPL-SCNC: 144 MMOL/L (ref 133–144)
SODIUM SERPL-SCNC: 145 MMOL/L (ref 133–144)
SODIUM SERPL-SCNC: 145 MMOL/L (ref 133–144)
SODIUM SERPL-SCNC: 146 MMOL/L (ref 133–144)
SPECIMEN SOURCE: NORMAL
VANCOMYCIN SERPL-MCNC: 16.1 MG/L
WBC # BLD AUTO: 13.5 10E9/L (ref 4–11)
WBC # BLD AUTO: 15.3 10E9/L (ref 4–11)
WBC # BLD AUTO: 15.5 10E9/L (ref 4–11)
WBC # BLD AUTO: 15.7 10E9/L (ref 4–11)
WBC # BLD AUTO: 15.9 10E9/L (ref 4–11)

## 2017-03-30 PROCEDURE — 25000128 H RX IP 250 OP 636: Performed by: PHYSICIAN ASSISTANT

## 2017-03-30 PROCEDURE — 25000132 ZZH RX MED GY IP 250 OP 250 PS 637: Performed by: PHYSICIAN ASSISTANT

## 2017-03-30 PROCEDURE — 25000125 ZZHC RX 250

## 2017-03-30 PROCEDURE — 25000128 H RX IP 250 OP 636

## 2017-03-30 PROCEDURE — 25000125 ZZHC RX 250: Performed by: PHYSICIAN ASSISTANT

## 2017-03-30 PROCEDURE — 25800025 ZZH RX 258: Performed by: NURSE ANESTHETIST, CERTIFIED REGISTERED

## 2017-03-30 PROCEDURE — 0JBL0ZZ EXCISION OF RIGHT UPPER LEG SUBCUTANEOUS TISSUE AND FASCIA, OPEN APPROACH: ICD-10-PCS | Performed by: SURGERY

## 2017-03-30 PROCEDURE — 84100 ASSAY OF PHOSPHORUS: CPT | Performed by: SURGERY

## 2017-03-30 PROCEDURE — 40000940 XR ABDOMEN PORT F1 VW

## 2017-03-30 PROCEDURE — 20000003 ZZH R&B ICU

## 2017-03-30 PROCEDURE — 37000008 ZZH ANESTHESIA TECHNICAL FEE, 1ST 30 MIN: Performed by: SURGERY

## 2017-03-30 PROCEDURE — 85027 COMPLETE CBC AUTOMATED: CPT | Performed by: PHYSICIAN ASSISTANT

## 2017-03-30 PROCEDURE — 82330 ASSAY OF CALCIUM: CPT | Performed by: PHYSICIAN ASSISTANT

## 2017-03-30 PROCEDURE — 25000125 ZZHC RX 250: Performed by: NURSE ANESTHETIST, CERTIFIED REGISTERED

## 2017-03-30 PROCEDURE — 40000008 ZZH STATISTIC AIRWAY CARE

## 2017-03-30 PROCEDURE — 36000058 ZZH SURGERY LEVEL 3 EA 15 ADDTL MIN: Performed by: SURGERY

## 2017-03-30 PROCEDURE — 36000056 ZZH SURGERY LEVEL 3 1ST 30 MIN: Performed by: SURGERY

## 2017-03-30 PROCEDURE — 40000940 XR CHEST PORT 1 VW

## 2017-03-30 PROCEDURE — 25000131 ZZH RX MED GY IP 250 OP 636 PS 637: Performed by: PHYSICIAN ASSISTANT

## 2017-03-30 PROCEDURE — 71010 XR ABDOMEN PORT F1 VW: CPT | Mod: 77

## 2017-03-30 PROCEDURE — 11005 DBRDMT SKIN ABDOMINAL WALL: CPT | Mod: 58 | Performed by: SURGERY

## 2017-03-30 PROCEDURE — 80202 ASSAY OF VANCOMYCIN: CPT | Performed by: PHYSICIAN ASSISTANT

## 2017-03-30 PROCEDURE — 25000128 H RX IP 250 OP 636: Performed by: NURSE ANESTHETIST, CERTIFIED REGISTERED

## 2017-03-30 PROCEDURE — 80048 BASIC METABOLIC PNL TOTAL CA: CPT | Performed by: PHYSICIAN ASSISTANT

## 2017-03-30 PROCEDURE — 25000125 ZZHC RX 250: Performed by: SURGERY

## 2017-03-30 PROCEDURE — 94003 VENT MGMT INPAT SUBQ DAY: CPT

## 2017-03-30 PROCEDURE — 71010 XR CHEST PORT 1 VW: CPT

## 2017-03-30 PROCEDURE — 83735 ASSAY OF MAGNESIUM: CPT | Performed by: SURGERY

## 2017-03-30 PROCEDURE — 83605 ASSAY OF LACTIC ACID: CPT | Performed by: PHYSICIAN ASSISTANT

## 2017-03-30 PROCEDURE — 80048 BASIC METABOLIC PNL TOTAL CA: CPT | Performed by: SURGERY

## 2017-03-30 PROCEDURE — 25000564 ZZH DESFLURANE, EA 15 MIN: Performed by: SURGERY

## 2017-03-30 PROCEDURE — 85027 COMPLETE CBC AUTOMATED: CPT | Performed by: SURGERY

## 2017-03-30 PROCEDURE — 27210995 ZZH RX 272: Performed by: SURGERY

## 2017-03-30 PROCEDURE — 83735 ASSAY OF MAGNESIUM: CPT | Performed by: PHYSICIAN ASSISTANT

## 2017-03-30 PROCEDURE — 40000275 ZZH STATISTIC RCP TIME EA 10 MIN

## 2017-03-30 PROCEDURE — 27210794 ZZH OR GENERAL SUPPLY STERILE: Performed by: SURGERY

## 2017-03-30 PROCEDURE — 37000009 ZZH ANESTHESIA TECHNICAL FEE, EACH ADDTL 15 MIN: Performed by: SURGERY

## 2017-03-30 PROCEDURE — 84100 ASSAY OF PHOSPHORUS: CPT | Performed by: PHYSICIAN ASSISTANT

## 2017-03-30 PROCEDURE — 00000146 ZZHCL STATISTIC GLUCOSE BY METER IP

## 2017-03-30 PROCEDURE — S0077 INJECTION, CLINDAMYCIN PHOSP: HCPCS | Performed by: SURGERY

## 2017-03-30 PROCEDURE — 99291 CRITICAL CARE FIRST HOUR: CPT | Performed by: PHYSICIAN ASSISTANT

## 2017-03-30 RX ORDER — SODIUM CHLORIDE, SODIUM LACTATE, POTASSIUM CHLORIDE, CALCIUM CHLORIDE 600; 310; 30; 20 MG/100ML; MG/100ML; MG/100ML; MG/100ML
INJECTION, SOLUTION INTRAVENOUS CONTINUOUS PRN
Status: DISCONTINUED | OUTPATIENT
Start: 2017-03-30 | End: 2017-03-30

## 2017-03-30 RX ORDER — MAGNESIUM HYDROXIDE 1200 MG/15ML
LIQUID ORAL PRN
Status: DISCONTINUED | OUTPATIENT
Start: 2017-03-30 | End: 2017-03-30 | Stop reason: HOSPADM

## 2017-03-30 RX ORDER — SODIUM CHLORIDE 9 MG/ML
INJECTION, SOLUTION INTRAVENOUS CONTINUOUS PRN
Status: DISCONTINUED | OUTPATIENT
Start: 2017-03-30 | End: 2017-03-30

## 2017-03-30 RX ADMIN — CLINDAMYCIN PHOSPHATE 900 MG: 18 INJECTION, SOLUTION INTRAVENOUS at 03:57

## 2017-03-30 RX ADMIN — SODIUM CHLORIDE 5 UNITS/HR: 9 INJECTION, SOLUTION INTRAVENOUS at 04:07

## 2017-03-30 RX ADMIN — HEPARIN SODIUM 5000 UNITS: 10000 INJECTION, SOLUTION INTRAVENOUS; SUBCUTANEOUS at 17:32

## 2017-03-30 RX ADMIN — CHLORHEXIDINE GLUCONATE 15 ML: 1.2 RINSE ORAL at 09:09

## 2017-03-30 RX ADMIN — SODIUM CHLORIDE 4 UNITS/HR: 9 INJECTION, SOLUTION INTRAVENOUS at 10:25

## 2017-03-30 RX ADMIN — VANCOMYCIN HYDROCHLORIDE 1500 MG: 5 INJECTION, POWDER, LYOPHILIZED, FOR SOLUTION INTRAVENOUS at 03:14

## 2017-03-30 RX ADMIN — SODIUM CHLORIDE: 9 INJECTION, SOLUTION INTRAVENOUS at 14:26

## 2017-03-30 RX ADMIN — PROPOFOL 75 MCG/KG/MIN: 10 INJECTION, EMULSION INTRAVENOUS at 16:36

## 2017-03-30 RX ADMIN — PROPOFOL 75 MCG/KG/MIN: 10 INJECTION, EMULSION INTRAVENOUS at 10:57

## 2017-03-30 RX ADMIN — PIPERACILLIN SODIUM,TAZOBACTAM SODIUM 3.38 G: 3; .375 INJECTION, POWDER, FOR SOLUTION INTRAVENOUS at 17:33

## 2017-03-30 RX ADMIN — SODIUM CHLORIDE 4 UNITS/HR: 9 INJECTION, SOLUTION INTRAVENOUS at 11:58

## 2017-03-30 RX ADMIN — NOREPINEPHRINE BITARTRATE 0.06 MCG/KG/MIN: 1 INJECTION INTRAVENOUS at 01:59

## 2017-03-30 RX ADMIN — SODIUM CHLORIDE 4 UNITS/HR: 9 INJECTION, SOLUTION INTRAVENOUS at 18:22

## 2017-03-30 RX ADMIN — PROPOFOL 65 MCG/KG/MIN: 10 INJECTION, EMULSION INTRAVENOUS at 06:48

## 2017-03-30 RX ADMIN — PROPOFOL 75 MCG/KG/MIN: 10 INJECTION, EMULSION INTRAVENOUS at 18:29

## 2017-03-30 RX ADMIN — PIPERACILLIN SODIUM,TAZOBACTAM SODIUM 3.38 G: 3; .375 INJECTION, POWDER, FOR SOLUTION INTRAVENOUS at 06:01

## 2017-03-30 RX ADMIN — CALCIUM GLUCONATE 2 G: 94 INJECTION, SOLUTION INTRAVENOUS at 11:01

## 2017-03-30 RX ADMIN — PROPOFOL 75 MCG/KG/MIN: 10 INJECTION, EMULSION INTRAVENOUS at 14:38

## 2017-03-30 RX ADMIN — VANCOMYCIN HYDROCHLORIDE 1500 MG: 5 INJECTION, POWDER, LYOPHILIZED, FOR SOLUTION INTRAVENOUS at 20:28

## 2017-03-30 RX ADMIN — CHLORHEXIDINE GLUCONATE 15 ML: 1.2 RINSE ORAL at 20:41

## 2017-03-30 RX ADMIN — PROPOFOL 75 MCG/KG/MIN: 10 INJECTION, EMULSION INTRAVENOUS at 22:14

## 2017-03-30 RX ADMIN — SODIUM CHLORIDE: 9 INJECTION, SOLUTION INTRAVENOUS at 00:37

## 2017-03-30 RX ADMIN — PROPOFOL 65 MCG/KG/MIN: 10 INJECTION, EMULSION INTRAVENOUS at 00:15

## 2017-03-30 RX ADMIN — NOREPINEPHRINE BITARTRATE 0.06 MCG/KG/MIN: 1 INJECTION INTRAVENOUS at 11:58

## 2017-03-30 RX ADMIN — FENTANYL CITRATE 100 MCG: 50 INJECTION, SOLUTION INTRAMUSCULAR; INTRAVENOUS at 10:07

## 2017-03-30 RX ADMIN — CISATRACURIUM BESYLATE 10 MG: 2 INJECTION INTRAVENOUS at 12:03

## 2017-03-30 RX ADMIN — PIPERACILLIN SODIUM,TAZOBACTAM SODIUM 3.38 G: 3; .375 INJECTION, POWDER, FOR SOLUTION INTRAVENOUS at 00:40

## 2017-03-30 RX ADMIN — PROPOFOL 65 MCG/KG/MIN: 10 INJECTION, EMULSION INTRAVENOUS at 01:56

## 2017-03-30 RX ADMIN — PROPOFOL 75 MCG/KG/MIN: 10 INJECTION, EMULSION INTRAVENOUS at 20:27

## 2017-03-30 RX ADMIN — FENTANYL CITRATE 100 MCG: 50 INJECTION, SOLUTION INTRAMUSCULAR; INTRAVENOUS at 12:13

## 2017-03-30 RX ADMIN — PROPOFOL 65 MCG/KG/MIN: 10 INJECTION, EMULSION INTRAVENOUS at 03:56

## 2017-03-30 RX ADMIN — CLINDAMYCIN PHOSPHATE 900 MG: 18 INJECTION, SOLUTION INTRAVENOUS at 18:43

## 2017-03-30 RX ADMIN — CLINDAMYCIN PHOSPHATE 900 MG: 18 INJECTION, SOLUTION INTRAVENOUS at 10:27

## 2017-03-30 RX ADMIN — SODIUM CHLORIDE: 9 INJECTION, SOLUTION INTRAVENOUS at 11:58

## 2017-03-30 RX ADMIN — PROPOFOL 75 MCG/KG/MIN: 10 INJECTION, EMULSION INTRAVENOUS at 11:58

## 2017-03-30 RX ADMIN — SODIUM CHLORIDE, POTASSIUM CHLORIDE, SODIUM LACTATE AND CALCIUM CHLORIDE: 600; 310; 30; 20 INJECTION, SOLUTION INTRAVENOUS at 11:58

## 2017-03-30 NOTE — ANESTHESIA PREPROCEDURE EVALUATION
Anesthesia Evaluation     . Pt has had prior anesthetic.     No history of anesthetic complications          ROS/MED HX    ENT/Pulmonary: Comment: Intubated resp failure      (-) sleep apnea   Neurologic: Comment: Non responsive      Cardiovascular:     (+) Dyslipidemia, ----. : . . . :. .       METS/Exercise Tolerance:     Hematologic:     (+) Anemia, -      Musculoskeletal:         GI/Hepatic:        (-) GERD   Renal/Genitourinary:         Endo: Comment: PCOD, glucose intol    (+) Obesity, .      Psychiatric:         Infectious Disease: Comment: necrotizing fasciitis          Malignancy:         Other:                     Physical Exam      Airway   Comment: intubated    Dental   Comment: intubated    Cardiovascular   Rhythm and rate: regular      Pulmonary    breath sounds clear to auscultation                    Anesthesia Plan      History & Physical Review  History and physical reviewed and following examination; no interval change.    ASA Status:  4 .        Plan for General          Postoperative Care      Consents                          .  Procedure: Procedure(s):  EXPLORE GROIN  IRRIGATION AND DEBRIDEMENT GROIN  Preop diagnosis: RIGHT GROIN SOFT TISSUE INFECTION    Allergies   Allergen Reactions     Cats      Shellfish-Derived Products      Past Medical History:   Diagnosis Date     Polycystic disease, ovaries      Past Surgical History:   Procedure Laterality Date     APPENDECTOMY       EXCISE LESION GROIN Right 3/29/2017    Procedure: EXCISE LESION GROIN;  Surgeon: Mary Beth Neal MD;  Location:  OR     Prior to Admission medications    Medication Sig Start Date End Date Taking? Authorizing Provider   Acetaminophen (TYLENOL PO) Take 325 mg by mouth every 6 hours as needed for mild pain or fever   Yes Unknown, Entered By History   Naproxen Sodium (ALEVE PO) Take 220 mg by mouth 2 times daily as needed for moderate pain   Yes Unknown, Entered By History     Current Facility-Administered Medications  Ordered in Epic   Medication Dose Route Frequency Last Rate Last Dose     naloxone (NARCAN) injection 0.1-0.4 mg  0.1-0.4 mg Intravenous Q2 Min PRN         oxyCODONE-acetaminophen (PERCOCET) 5-325 MG per tablet 1-2 tablet  1-2 tablet Oral Q4H PRN         ondansetron (ZOFRAN-ODT) ODT tab 4 mg  4 mg Oral Q6H PRN        Or     ondansetron (ZOFRAN) injection 4 mg  4 mg Intravenous Q6H PRN         prochlorperazine (COMPAZINE) injection 5-10 mg  5-10 mg Intravenous Q6H PRN        Or     prochlorperazine (COMPAZINE) tablet 5-10 mg  5-10 mg Oral Q6H PRN        Or     prochlorperazine (COMPAZINE) Suppository 25 mg  25 mg Rectal Q12H PRN         metoclopramide (REGLAN) tablet 10 mg  10 mg Oral Q6H PRN        Or     metoclopramide (REGLAN) injection 10 mg  10 mg Intravenous Q6H PRN         senna-docusate (SENOKOT-S;PERICOLACE) 8.6-50 MG per tablet 1-2 tablet  1-2 tablet Oral BID PRN         clindamycin (CLEOCIN) infusion 900 mg  900 mg Intravenous Q8H 50 mL/hr at 03/30/17 0357 900 mg at 03/30/17 0357     chlorhexidine (PERIDEX) 0.12 % solution 15 mL  15 mL Mouth/Throat Q12H   15 mL at 03/30/17 0909     propofol (DIPRIVAN) infusion  5-75 mcg/kg/min Intravenous Continuous 44.6 mL/hr at 03/30/17 0700 65 mcg/kg/min at 03/30/17 0700    And     propofol (DIPRIVAN) infusion 10-20 mg  10-20 mg Intravenous Q30 Min PRN         heparin sodium PF injection 5,000 Units  5,000 Units Subcutaneous Q8H   Stopped at 03/29/17 1835     norepinephrine (LEVOPHED) 16 mg in D5W 250 mL infusion  0.03-0.4 mcg/kg/min Intravenous Continuous 6.4 mL/hr at 03/30/17 0700 0.06 mcg/kg/min at 03/30/17 0700     magnesium hydroxide (MILK OF MAGNESIA) suspension 30 mL  30 mL Oral Daily PRN         piperacillin-tazobactam (ZOSYN) 3.375 g vial to attach to  mL bag  3.375 g Intravenous Q6H   3.375 g at 03/30/17 0601     0.9% sodium chloride infusion   Intravenous Continuous 125 mL/hr at 03/30/17 0037       No Pre Procedure Antibiotic Needed  1 each As  instructed Continuous         dextrose 10 % 1,000 mL infusion   Intravenous Continuous PRN         insulin 1 unit/mL in saline (NovoLIN, HumuLIN Regular) drip - ADULT IV Infusion  0-24 Units/hr Intravenous Continuous 2 mL/hr at 03/30/17 0900 2 Units/hr at 03/30/17 0900     glucose 40 % gel 15-30 g  15-30 g Oral Q15 Min PRN        Or     dextrose 50 % injection 25-50 mL  25-50 mL Intravenous Q15 Min PRN        Or     glucagon injection 1 mg  1 mg Subcutaneous Q15 Min PRN         vancomycin (VANCOCIN) 1500 mg in 0.9% NaCl 250 mL PREMIX  1,500 mg Intravenous Q8H   1,500 mg at 03/30/17 0314     fentaNYL (SUBLIMAZE) infusion  25-50 mcg/hr Intravenous Continuous 0.5 mL/hr at 03/30/17 0700 25 mcg/hr at 03/30/17 0700     HOLD MEDICATION   Does not apply HOLD         potassium chloride SA (K-DUR/KLOR-CON M) CR tablet 20-40 mEq  20-40 mEq Oral Q2H PRN         potassium chloride (KLOR-CON) Packet 20-40 mEq  20-40 mEq Oral or Feeding Tube Q2H PRN         potassium chloride 10 mEq in 100 mL intermittent infusion  10 mEq Intravenous Q1H PRN         potassium chloride 10 mEq in 100 mL intermittent infusion with 10 mg lidocaine  10 mEq Intravenous Q1H PRN         potassium chloride 20 mEq in 50 mL intermittent infusion  20 mEq Intravenous Q1H PRN 50 mL/hr at 03/29/17 2131 20 mEq at 03/29/17 2131     magnesium sulfate 4 g in 100 mL sterile water (premade)  4 g Intravenous Q4H PRN         sodium phosphate 15 mmol in D5W intermittent infusion  15 mmol Intravenous Daily PRN         sodium phosphate 20 mmol in D5W intermittent infusion  20 mmol Intravenous Q6H PRN         sodium phosphate 25 mmol in D5W intermittent infusion  25 mmol Intravenous Q8H PRN         fentaNYL Citrate (PF) (SUBLIMAZE) injection  mcg   mcg Intravenous Q2H PRN         No current Epic-ordered outpatient prescriptions on file.     Wt Readings from Last 1 Encounters:   03/29/17 114.3 kg (252 lb)     Temp Readings from Last 1 Encounters:   03/30/17 37  C  (98.6  F)     BP Readings from Last 6 Encounters:   03/30/17 145/75     Pulse Readings from Last 4 Encounters:   03/29/17 102     Resp Readings from Last 1 Encounters:   03/30/17 16     SpO2 Readings from Last 1 Encounters:   03/30/17 94%     Recent Labs   Lab Test  03/30/17   0611  03/30/17   0300   NA  144  143   POTASSIUM  3.8  3.7   CHLORIDE  113*  112*   CO2  24  25   ANIONGAP  7  6   GLC  135*  149*   BUN  3*  3*   CR  0.60  0.60   ROBERT  6.8*  6.9*     Recent Labs   Lab Test  03/30/17   0611  03/30/17   0300   WBC  15.7*  15.9*   HGB  11.3*  11.0*   PLT  274  271     Recent Labs   Lab Test  03/29/17   1515   INR  1.29*      RECENT LABS:   ECG:   ECHO:   CXR:

## 2017-03-30 NOTE — PLAN OF CARE
Problem: Goal Outcome Summary  Goal: Goal Outcome Summary  Patient remains on full vent support - Propofol for sedation - Levophed to keep MAP greater than 65 - On Insulin gtt for BG control - The ET tube had to be repositioned because it had come dislodged with her moving head around so much when she is awake - it was repositioned with using a bronchoscope by Dr Cyr - patient tolerated well - went to OR at 1145 for debridement of the groin wound - she returned at 1300 - the plan is to return tomorrow, so patient is to remain sedated and intubated at this time -dressing has some serosanguinous drainage - Patients mom has been at the bedside getting updates today

## 2017-03-30 NOTE — ANESTHESIA PROCEDURE NOTES
CENTRAL LINE INSERTION PROCEDURE NOTE:   Pre-Procedure  Performed by MARIBELL CHILEL  Location: OR, In OR After Induction      Pre-Anesthestic Checklist: patient identified, IV checked, risks and benefits discussed, informed consent, monitors and equipment checked and pre-op evaluation    Timeout  Correct Patient: Yes   Correct Procedure: Yes   Correct Site: Yes   Correct Laterality: N/A   Correct Position: Yes   Site Marked: N/A   .   Procedure Documentation   Procedure: central line and elective  Position: Trendelenburg  Patient Prep;all elements of maximal sterile barrier technique followed, mask, hat, sterile gown, sterile gloves, draped, chlorhexidine gluconate and isopropyl alcohol, patient draped      Insertion Site:internal jugular, left  Using U/S with sterile probe cover and sterile gel, vein evaluated for patency/adequacy of cortis insertion is adequate, and using realtime U/S imaging the krzysztof was punctured, and needle was observed entering vein on U/S. A permanent image is entered into the patient's record.     Catheter: 7 Fr, 20 cm, T.L. (8 Fr. 16 cm, Double Lumen CVL)  Assessment/Narrative         Secured by suture  Tegaderm and Biopatch dressing used.  blood aspirated (Aspirated first, then flushed) from all lumens  All lumens flushed: Yes  Verification method: Ultrasound, Placement to be verified post-op and X-ray  Comments:  Seldinger technique to place CVL under real-time U/S guidance.    U/S used to identify vein, and used for real-time guidance to watch needle, IV Cath, and wire insertion into IJ.    Permanent U/S image recorded.      After 18 G IV catheter placed into right IJ, sterile IV tubing used to transduce venous pressure with CVP ~5 prior to wire placement, dilator, and CVL insertion.    No complications.

## 2017-03-30 NOTE — PHARMACY-VANCOMYCIN DOSING SERVICE
Pharmacy Vancomycin Note  Date of Service 2017  Patient's  1980   36 year old, female    Indication: Skin and Soft Tissue Infection, Necrotizing fasciitis  Goal Trough Level: 15-20 mg/L  Day of Therapy: 2  Current Vancomycin regimen:  1500 mg IV q8h    Current estimated CrCl = Estimated Creatinine Clearance: 105.5 mL/min (based on Cr of 0.96).    Creatinine for last 3 days  3/29/2017: 10:00 AM Creatinine 0.66 mg/dL;  3:15 PM Creatinine 0.58 mg/dL;  6:05 PM Creatinine 0.52 mg/dL; 10:10 PM Creatinine 0.48 mg/dL  3/30/2017:  3:00 AM Creatinine 0.60 mg/dL;  6:11 AM Creatinine 0.60 mg/dL; 10:16 AM Creatinine 0.96 mg/dL    Recent Vancomycin Levels (past 3 days)  3/30/2017: 10:16 AM Vancomycin Level 16.1 mg/L    Vancomycin IV Administrations (past 72 hours)                   vancomycin (VANCOCIN) 1500 mg in 0.9% NaCl 250 mL PREMIX (mg) 1,500 mg New Bag 17 0314     1,500 mg New Bag 17 2129    vancomycin (VANCOCIN) 2,000 mg in NaCl 0.9 % 500 mL intermittent infusion (mg) 2,000 mg New Bag 17 1201                Nephrotoxins and other renal medications (Future)    Start     Dose/Rate Route Frequency Ordered Stop    17 2000  vancomycin (VANCOCIN) 1500 mg in 0.9% NaCl 250 mL PREMIX      1,500 mg Intravenous EVERY 8 HOURS 17 1753      17 1800  piperacillin-tazobactam (ZOSYN) 3.375 g vial to attach to  mL bag     Comments:  Pharmacy can adjust dose based on renal function.    3.375 g  over 1 Hours Intravenous EVERY 6 HOURS 17 1721      17 1730  norepinephrine (LEVOPHED) 16 mg in D5W 250 mL infusion      0.03-0.4 mcg/kg/min × 114.3 kg  3.2-42.9 mL/hr  Intravenous CONTINUOUS 17 1721               Contrast Orders - past 72 hours (72h ago through future)    Start     Dose/Rate Route Frequency Ordered Stop    17 1049  iopamidol (ISOVUE-370) solution 123 mL      123 mL Intravenous ONCE 17 1049 17 1106          Interpretation of levels and  current regimen:  Trough level is  Therapeutic    Has serum creatinine changed > 50% in last 72 hours: No    Urine output:  unable to determine    Renal Function: Stable    Plan:  1.  Continue Current Dose  2.  Pharmacy will check trough levels as appropriate in 1-3 Days.    3. Serum creatinine levels will be ordered daily for the first week of therapy and at least twice weekly for subsequent weeks.      Mary Mendoza, PharmD           .

## 2017-03-30 NOTE — PROGRESS NOTES
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 Pt unconscious and able to advise. Packet copied and physician statement that pt is in ICU sent along with pt's packet of program information. Pt's mother took envelope to have express mailed so it will arrive by 4//1/17.  SW contact information given to family for any further assistance.  P: SW following as needed.

## 2017-03-30 NOTE — PLAN OF CARE
Problem: Goal Outcome Summary  Goal: Goal Outcome Summary  Outcome: No Change  Pt remains on full vent support with Propofol and Fentanyl. Withdraws from pain, does not open her eyes. Hemodynamically stable on Levo gtt. Insulin gtt. UO adequate. LA and WBCs trending down. Plan for follow-up I/D today of right groin wound. Will continue to monitor.

## 2017-03-30 NOTE — PROGRESS NOTES
[facility] ICU RESPIRATORY NOTE  Date of Admission:3/29/2017  Date of Intubation (most recent):3/29/2017  Reason for Mechanical Ventilation: Airway Protection  Number of Days on Mechanical Ventilation:2  Met Criteria for Pressure Support Trial: no  Length of Pressure Support Trial:  Reason for Stopping Pressure Support Trial:  Reason for No Pressure Support Trial: per MD      Recent Labs  Lab 03/29/17  1515   PH 7.36   PCO2 41   PO2 79*   HCO3 23   O2PER 70%     Ventilation Mode: CMV/AC  FiO2 (%): 50 %  Rate Set (breaths/minute): 15 breaths/min  Tidal Volume Set (mL): 600 mL  PEEP (cm H2O): 8 cmH2O  Oxygen Concentration (%): 50 %  Peak Inspiratory Pressure (cm H2O) (Akella Katerina): 1  Resp: 15    Ger Dhaliwal RT

## 2017-03-30 NOTE — PLAN OF CARE
Problem: Goal Outcome Summary  Goal: Goal Outcome Summary  Outcome: No Change  Pt arrived vented from PACU on levophed, propofol, insulin gtt.  Unable to lighten sedation due to incessent coughing on ETT and pt reaching for ETT.  Levophed weaned slightly, IV bolus given per orders. Good urine output.  Other VSS.  Incision was left open/packed, abd changed x1 for large amt of serosanguinous drainage. Fentanyl gtt started and pt seems more comfortable. Will wean propofol as able.  Pts mother at bedside and updated.  Plan for return to OR tomorrow.

## 2017-03-30 NOTE — OP NOTE
General Surgery Operative Note    PREOPERATIVE DIAGNOSIS: necrotizing soft tissue infection right groin    POSTOPERATIVE DIAGNOSIS: same  PROCEDURE: right groin exploration with excisional debridement of necrotic tissue    SURGEON:  Mary Beth Neal MD    ASSISTANT:  Sandra Delatorre PA-C  The PA s assistance was medically necessary to provide adequate exposure in the operating field, maintain hemostasis, cutting suture, clamping and ligating bleeding vessels, and visualization of anatomic structures throughout the surgical procedure.       ANESTHESIA:  General.    BLOOD LOSS: 50ml    FINDINGS: small amount of ongoing infection requiring excisional debridement. Wound now measures 30cm x 10cm x 8cm    INDICATIONS:   Ms. Landis presented with a necrotizing soft tissue infection in her right groin. She was taken to the OR yesterday (3/29/2017) for excisional debridement. She was hypotensive requiring pressors post operatively and has remained intubated in the ICU overnight. We are returning to the OR today for right groin exploration and excisional debridement.     DETAILS OF PROCEDURE: The patient was brought to the operating room from the intensive care unit. She was transferred to the operating room table in supine position with her legs frog legged. A Surgical timeout was then performed, verifying the correct surgeon, site, procedure, and patient and all in the room were in agreement. The wound was prepped with betadine solution.     The wound overall appeared clean on first inspection, there was necrotic tissue at the most medial aspect of the wound on the upper inner thigh. This was sharply excised back to healthy bleeding tissue. There was also ongoing necrosis along the lateral aspect of the wound which was also excised. The base of the wound was clean with granulation tissue. Medially there was necrosis that was excised as above and at this location we did encounter what appeared to be lipomatous tissue which was  healthy in appearance. This was not excised. The wound was again irrigated with several liters of warm saline. Two kerlix rolls were placed, packed tightly within the wound and covered with fluffs and an ABD. The wound measured 30cm x 10cm x 8cm.     Mary Beth Neal MD

## 2017-03-30 NOTE — PROGRESS NOTES
[facility] ICU RESPIRATORY NOTE  Date of Admission:3/29/2017  Date of Intubation (most recent):3/29/2017  Reason for Mechanical Ventilation: Airway Protection  Number of Days on Mechanical Ventilation:2  Met Criteria for Pressure Support Trial: no  Length of Pressure Support Trial:  Reason for Stopping Pressure Support Trial:  Reason for No Pressure Support Trial: per MD    Significant Events Today: no significant events this shift    ABG Results:     Recent Labs  Lab 03/29/17  1515   PH 7.36   PCO2 41   PO2 79*   HCO3 23   O2PER 70%     Ventilation Mode: CMV/AC  FiO2 (%): 50 %  Rate Set (breaths/minute): 15 breaths/min  Tidal Volume Set (mL): 600 mL  PEEP (cm H2O): 8 cmH2O  Oxygen Concentration (%): 50 %  Peak Inspiratory Pressure (cm H2O) (Drager Katerina): 1  Resp: 12      ETT appearance on chest x-ray: ETT in appropriate postion.    Plan:  Continue full vent support.  3/30/2017  Sherita CUETO

## 2017-03-30 NOTE — BRIEF OP NOTE
General Surgery Brief Operative Note    Pre-operative diagnosis: Necrotizing fascitis R groin   Post-operative diagnosis Same   Procedure: Procedure(s):  RIGHT GROIN EXPLORATION WITH EXCISION AND DEBRIDEMENT OF NECROTIZING SOFT TISSUE - Wound Class: I-Clean   - Wound Class: II-Clean Contaminated    Surgeon(s), Assistant(s): Surgeon(s) and Role:     * Mary Beth Neal MD - Primary     * Sandra Delatorre PA-C - Assisting   Estimated blood loss: * No values recorded between 3/30/2017 12:16 PM and 3/30/2017 12:53 PM *   Drains: None   Specimens: * No specimens in log *   Findings: Overall wound looks better, some excision of soft tissue and skin, wound packed with 2 kerlix.    Condition: Stable   Comments:      Sandra Delatorre PA-C See dictated operative report for full details. Plan for dressing change at bedisde for next 3 days. Possible wound vac placement Monday.

## 2017-03-30 NOTE — ANESTHESIA CARE TRANSFER NOTE
Patient: Caro Landis    Procedure(s):  RIGHT GROIN EXPLORATION WITH EXCISION AND DEBRIDEMENT OF NECROTIZING SOFT TISSUE - Wound Class: I-Clean   - Wound Class: II-Clean Contaminated    Diagnosis: RIGHT GROIN SOFT TISSUE INFECTION  Diagnosis Additional Information: No value filed.    Anesthesia Type:   General     Note:  Airway :ETT  Patient transferred to:ICU        Vitals: (Last set prior to Anesthesia Care Transfer)    CRNA VITALS  3/30/2017 1236 - 3/30/2017 1306      3/30/2017             Resp Rate (set): 10                Electronically Signed By: MARTHA Angulo CRNA  March 30, 2017  1:06 PM

## 2017-03-30 NOTE — ANESTHESIA PROCEDURE NOTES
ARTERIAL LINE PROCEDURE NOTE:   Pre-Procedure  Performed by MARIBELL CHILEL  Location: OR      Pre-Anesthestic Checklist: patient identified, IV checked, risks and benefits discussed, informed consent, monitors and equipment checked and pre-op evaluation    Timeout  Correct Patient: Yes   Correct Procedure: Yes   Correct Site: Yes   Correct Laterality: N/A   Correct Position: Yes   Site Marked: N/A   .   Procedure Documentation  Procedure: arterial line    Supine  Insertion Site:left.Injection technique: Seldinger Technique and ultrasound guided  .  .  Patient Prep;all elements of maximal sterile barrier technique followed, mask, hat, sterile gown, sterile gloves, draped, hand hygiene, chlorhexidine gluconate and isopropyl alcohol    Assessment/Narrative    Catheter: 20 gauge, 1.75 in/4.5 cm quick cath (integral wire)     Secured by other  Tegaderm dressing used.    Arterial waveform: Yes IBP within 10% of NIBP: Yes    Comments:  Ultrasound used to identify the vessel, placed the needle into the artery under real-time visualization.  Picture taken and recorded in chart.

## 2017-03-30 NOTE — ANESTHESIA POSTPROCEDURE EVALUATION
Patient: Caro Landis    Procedure(s):  RIGHT GROIN EXPLORATION WITH EXCISION AND DEBRIDEMENT OF NECROTIZING SOFT TISSUE - Wound Class: I-Clean   - Wound Class: II-Clean Contaminated    Diagnosis:RIGHT GROIN SOFT TISSUE INFECTION  Diagnosis Additional Information: No value filed.    Anesthesia Type:  General    Note:  Anesthesia Post Evaluation    Patient location during evaluation: ICU  Patient participation: Unable to participate in evaluation secondary to underlying medical condition  Post-procedure mental status: sedated intubated.  Pain management: adequate  Airway patency: patent (intubated)  Cardiovascular status: acceptable  Respiratory status: acceptable  Hydration status: acceptable  PONV: controlled     Anesthetic complications: None          Last vitals:  Vitals:    03/30/17 1300 03/30/17 1315 03/30/17 1330   BP: 107/63 121/82 119/73   Pulse:      Resp: 16 15 14   Temp: 36.8  C (98.2  F) 36.9  C (98.4  F) 36.9  C (98.4  F)   SpO2: 99% 99% 98%         Electronically Signed By: Marry Curry MD  March 30, 2017  1:44 PM

## 2017-03-30 NOTE — PROGRESS NOTES
[facility]    Critical Care Service  Progress Note    Date of Service (when I saw the patient): 03/30/2017     ** Addendum: Patient tolerated second debridement well. Plans to keep the patient intubated until tomorrow. Will remain intubated for dressing change tomorrow as well**      Assessment & Plan   Caro Landis is a 36 year old female with PMHx of PCOS and HLD who presents to [facility] ED on 3/29 with a swollen painful R groin found to have necrotizing fasciitis now s/p debridement in the OR on 3/29. The patient remains critically ill and hypotensive from presumed septic shock.      Neuro  1. Acute pain  2. Sedation  Plan:  -- fentanyl bumps for pain management 25-50mcg/1hr   -- Propofol for sedation as needed until extubation  -- may need the addition of precedex gtt post-operatively      CV  1. Hypotension, likely septic shock in the setting of necrotizing fascitis. Random cortisol levels low but given that pressor needs have not increased, okay to hold off of stress dose steroids   2. Hypertriglyceridemia, likely related to PCOS  Plan:  -- Wean NE for blood pressure support as able   -- fluid resuscitation with IVF  -- trend LA and CBC/BMP       Resp:  1. Acute respiratory failure, post operatively secondary to acute illness. Remains intubated post operatively for airway protection.   Plan:  -- AC ventilation   -- PST when hemodynamically stable  -- Advancement of the ETT today     GI/Nutrition  1. No prior hx  Plan:  -- NPO  -- PPI     Renal  1. Hyponatremia. Typically occurs with severe necrotizing fascitis, resolved after NS boluses and IVFs   2. No prior hx. Baseline appears to be 0.6  3. Hypocalcemia  Plan:  -- Monitor Cr  -- wong for strict I&Os  -- 2g calcium gluconate      ID  1. Necrotizing fascitis of R groin now s/p debridement in the OR on 3/29 and 3/30. Gram stain shows moderate gram positive cocci and gram negative rods. CT scan shows extensive gas pattern in R groin suggestive of  necrotizing fasciitis   1. Currently afebrile with a down-trending leukocytosis and normal LA  Plan:  -- continue zosyn,clindamycin and vanc for necrotizing fascitis   -- follow up blood culture results      Endocrine  1. Stress Hyperglycemia, likely related to necrotizing fascitis, resolving   2. PCOS. Previously on metformin and spironolactone   3. Impaired fasting glucose per chart review. HgbA1c 9.6 on admission   Plan:  -- Insulin gtt per protocol if indicated  -- Keep BG <180 for optimal healing     Heme:  1. Acute blood loss anemia. Hgb 11.3  2. Coagulopathy (heparin induced), reversed   Plan:  -- Monitor hemoglobin.  -- Transfuse to keep > 7.0     MSK  1. Necrotizing fascitis, R leg and groin  Plan:  -- management plan per above      Skin  1. Redness of the face without apparent angioedema, some swelling of eyelids- resolved   2. Wound on R groin, necrotizing fascitis- erythema has not expanded past previously demarcated area   Plan:  -- wound care nurse consult   -- will continue to monitor facial rash      General cares:  DVT Prophylaxis: Heparin SQ to start after the OR today   GI Prophylaxis: PPI  Restraints: Restraints for medical healing needed: NO  Family update by me today: No  Current lines are required for patient management  Access:   IJ  L 3/29     Rosario Ward PA-C  Pager: [phone]    Time Spent on this Encounter   Billing:  I spent 60 minutes bedside and on the inpatient unit today managing the critical care of Caro Landis in relation to the issues listed in this note.    Main Plans for Today   -- calcium gluconate  -- reposition ETT  -- Plans for OR today     Interval History   Course reviewed. No acute events overnight. The patient has remained intubated and sedated. Her labs have corrected somewhat throughout the night with no escalation of pressors or hemodynamic instability. ROS is limited as the patient is intubated and sedated.     Physical Exam   Temp: 99.1  F (37.3  C) Temp src:  Esophageal Temp  Min: 97  F (36.1  C)  Max: 99.5  F (37.5  C) BP: 119/67 Pulse: 102 Heart Rate: 76 Resp: 26 SpO2: 96 % O2 Device: Mechanical Ventilator    Vitals:    03/29/17 0927   Weight: 114.3 kg (252 lb)     I/O last 3 completed shifts:  In: 8752.89 [I.V.:6752.89; IV Piggyback:2000]  Out: 3470 [Urine:3450; Blood:20]    GEN:  female, resting in bed, sedated  HEENT: Normocephalic, atraumatic, trachea midline, ETT secure, pupils equal and reactive   CV: RRR, no gallops, rubs, or murmurs  PULM/CHEST: Course upper airway sounds, clear breath sounds bilaterally no rhonchi, crackles or wheeze, symmetric chest rise  GI: normal bowel sounds, soft, non-tender, no rebound tenderness or guarding  : wong catheter in place, urine yellow and clear  EXTREMITIES: no peripheral edema, moving all extremities, peripheral pulses intact  NEURO: sedated on exam  SKIN: gauze in wound in R groin, bandages appear clean, erythema has not extended past area of de-lineation from yesterday   Imaging personally reviewed: CXR shows ETT pulled back  ECG-- NSR on tele     Medications     propofol (DIPRIVAN) infusion 75 mcg/kg/min (03/30/17 1057)     norepinephrine 0.06 mcg/kg/min (03/30/17 0700)     NaCl 125 mL/hr at 03/30/17 0037     no pre procedure antibiotic needed       IV fluid REPLACEMENT ONLY       insulin (regular) 4 Units/hr (03/30/17 1025)     fentaNYL 25 mcg/hr (03/30/17 0700)       calcium gluconate  2 g Intravenous Once     clindamycin  900 mg Intravenous Q8H     chlorhexidine  15 mL Mouth/Throat Q12H     heparin  5,000 Units Subcutaneous Q8H     piperacillin-tazobactam  3.375 g Intravenous Q6H     vancomycin (VANCOCIN) IV  1,500 mg Intravenous Q8H       Data     Recent Labs  Lab 03/30/17  1016 03/30/17  0611 03/30/17  0300  03/29/17  1515 03/29/17  1000   WBC 15.3* 15.7* 15.9*  < > 17.6* 17.2*   HGB 11.6* 11.3* 11.0*  < > 10.8* 13.9   MCV 86 85 85  < > 83 82    274 271  < > 244 279   INR  --   --   --   --   1.29*  --    * 144 143  < > 137 130*   POTASSIUM 3.9 3.8 3.7  < > 3.5 3.9   CHLORIDE 113* 113* 112*  < > 105 95   CO2 23 24 25  < > 23 26   BUN 4* 3* 3*  < > 5* 6*   CR 0.96 0.60 0.60  < > 0.58 0.66   ANIONGAP 9 7 6  < > 9 9   ROBERT 6.9* 6.8* 6.9*  < > 6.8* 7.9*   * 135* 149*  < > 213* 356*   ALBUMIN  --   --   --   --  1.7* 2.3*   PROTTOTAL  --   --   --   --  5.3* 7.1   BILITOTAL  --   --   --   --  0.7 0.9   ALKPHOS  --   --   --   --  57 83   ALT  --   --   --   --  11 18   AST  --   --   --   --  10 8   < > = values in this interval not displayed.  Recent Results (from the past 24 hour(s))   XR Chest Port 1 View    Narrative    CHEST PORTABLE ONE VIEW 3/29/2017 3:32 PM     HISTORY: Line placement.      Impression    IMPRESSION: Endotracheal tube tip is at the level of the clavicular  heads, at least 1 cm from the wendie. Left internal jugular central  line crosses the midline, presumably entering the proximal to mid  superior vena cava. There is a suboptimal inspiration with  opacifications at the left lung base and probably both lung apices.    JOSE ROSA MD   XR Chest Port 1 View    Narrative    XR CHEST PORT 1 VW 3/30/2017 9:22 AM    COMPARISON: 3/29/2017    HISTORY: Pulmonary congestion.      Impression    IMPRESSION: Pulmonary vascular congestion is again noted. Enteric tube  courses below the diaphragm, tip not included in this image. Left  internal jugular central venous catheter tip in the mid SVC.  Endotracheal tube appears to have been retracted, now above the  thoracic inlet. It should be advanced approximately 6 cm. No  pneumothorax.    SABRINA BEACH

## 2017-03-30 NOTE — ADDENDUM NOTE
Addendum  created 03/29/17 1917 by Darrell Durham MD    Anesthesia Intra Blocks edited, Anesthesia Intra LDAs edited, Child order released for a procedure order, LDA properties accepted, Pend clinical note, Sign clinical note

## 2017-03-31 LAB
ANION GAP SERPL CALCULATED.3IONS-SCNC: 10 MMOL/L (ref 3–14)
BASE DEFICIT BLDA-SCNC: 5.1 MMOL/L
BASE DEFICIT BLDA-SCNC: 6.5 MMOL/L
BUN SERPL-MCNC: 5 MG/DL (ref 7–30)
CA-I BLD-MCNC: 4.3 MG/DL (ref 4.4–5.2)
CALCIUM SERPL-MCNC: 7.5 MG/DL (ref 8.5–10.1)
CHLORIDE SERPL-SCNC: 112 MMOL/L (ref 94–109)
CO2 SERPL-SCNC: 23 MMOL/L (ref 20–32)
CREAT SERPL-MCNC: 1.6 MG/DL (ref 0.52–1.04)
ERYTHROCYTE [DISTWIDTH] IN BLOOD BY AUTOMATED COUNT: 13.5 % (ref 10–15)
GFR SERPL CREATININE-BSD FRML MDRD: 36 ML/MIN/1.7M2
GLUCOSE BLDC GLUCOMTR-MCNC: 105 MG/DL (ref 70–99)
GLUCOSE BLDC GLUCOMTR-MCNC: 109 MG/DL (ref 70–99)
GLUCOSE BLDC GLUCOMTR-MCNC: 127 MG/DL (ref 70–99)
GLUCOSE BLDC GLUCOMTR-MCNC: 133 MG/DL (ref 70–99)
GLUCOSE BLDC GLUCOMTR-MCNC: 89 MG/DL (ref 70–99)
GLUCOSE BLDC GLUCOMTR-MCNC: 90 MG/DL (ref 70–99)
GLUCOSE BLDC GLUCOMTR-MCNC: 95 MG/DL (ref 70–99)
GLUCOSE BLDC GLUCOMTR-MCNC: 99 MG/DL (ref 70–99)
GLUCOSE SERPL-MCNC: 120 MG/DL (ref 70–99)
HCO3 BLD-SCNC: 20 MMOL/L (ref 21–28)
HCO3 BLD-SCNC: 21 MMOL/L (ref 21–28)
HCT VFR BLD AUTO: 32.3 % (ref 35–47)
HGB BLD-MCNC: 10.9 G/DL (ref 11.7–15.7)
LACTATE BLD-SCNC: 0.7 MMOL/L (ref 0.7–2.1)
MAGNESIUM SERPL-MCNC: 2 MG/DL (ref 1.6–2.3)
MCH RBC QN AUTO: 28.8 PG (ref 26.5–33)
MCHC RBC AUTO-ENTMCNC: 33.7 G/DL (ref 31.5–36.5)
MCV RBC AUTO: 85 FL (ref 78–100)
O2/TOTAL GAS SETTING VFR VENT: ABNORMAL %
OXYHGB MFR BLD: 92 % (ref 92–100)
OXYHGB MFR BLD: 93 % (ref 92–100)
PCO2 BLD: 41 MM HG (ref 35–45)
PCO2 BLD: 47 MM HG (ref 35–45)
PH BLD: 7.27 PH (ref 7.35–7.45)
PH BLD: 7.28 PH (ref 7.35–7.45)
PHOSPHATE SERPL-MCNC: 4.4 MG/DL (ref 2.5–4.5)
PLATELET # BLD AUTO: 301 10E9/L (ref 150–450)
PO2 BLD: 73 MM HG (ref 80–105)
PO2 BLD: 73 MM HG (ref 80–105)
POTASSIUM SERPL-SCNC: 3.7 MMOL/L (ref 3.4–5.3)
POTASSIUM SERPL-SCNC: 4 MMOL/L (ref 3.4–5.3)
RBC # BLD AUTO: 3.78 10E12/L (ref 3.8–5.2)
SODIUM SERPL-SCNC: 145 MMOL/L (ref 133–144)
VANCOMYCIN SERPL-MCNC: 33.5 MG/L
WBC # BLD AUTO: 12.3 10E9/L (ref 4–11)

## 2017-03-31 PROCEDURE — 94640 AIRWAY INHALATION TREATMENT: CPT | Mod: 76

## 2017-03-31 PROCEDURE — 82330 ASSAY OF CALCIUM: CPT | Performed by: PHYSICIAN ASSISTANT

## 2017-03-31 PROCEDURE — 84132 ASSAY OF SERUM POTASSIUM: CPT | Performed by: PHYSICIAN ASSISTANT

## 2017-03-31 PROCEDURE — 25000125 ZZHC RX 250

## 2017-03-31 PROCEDURE — 40000008 ZZH STATISTIC AIRWAY CARE

## 2017-03-31 PROCEDURE — 85027 COMPLETE CBC AUTOMATED: CPT | Performed by: PHYSICIAN ASSISTANT

## 2017-03-31 PROCEDURE — 20000003 ZZH R&B ICU

## 2017-03-31 PROCEDURE — 84100 ASSAY OF PHOSPHORUS: CPT | Performed by: PHYSICIAN ASSISTANT

## 2017-03-31 PROCEDURE — 80202 ASSAY OF VANCOMYCIN: CPT | Performed by: SURGERY

## 2017-03-31 PROCEDURE — 83735 ASSAY OF MAGNESIUM: CPT | Performed by: PHYSICIAN ASSISTANT

## 2017-03-31 PROCEDURE — 94002 VENT MGMT INPAT INIT DAY: CPT

## 2017-03-31 PROCEDURE — 83605 ASSAY OF LACTIC ACID: CPT | Performed by: PHYSICIAN ASSISTANT

## 2017-03-31 PROCEDURE — 80048 BASIC METABOLIC PNL TOTAL CA: CPT | Performed by: PHYSICIAN ASSISTANT

## 2017-03-31 PROCEDURE — 25000131 ZZH RX MED GY IP 250 OP 636 PS 637: Performed by: PHYSICIAN ASSISTANT

## 2017-03-31 PROCEDURE — 94003 VENT MGMT INPAT SUBQ DAY: CPT

## 2017-03-31 PROCEDURE — S0077 INJECTION, CLINDAMYCIN PHOSP: HCPCS | Performed by: SURGERY

## 2017-03-31 PROCEDURE — 99291 CRITICAL CARE FIRST HOUR: CPT | Performed by: PHYSICIAN ASSISTANT

## 2017-03-31 PROCEDURE — 00000146 ZZHCL STATISTIC GLUCOSE BY METER IP

## 2017-03-31 PROCEDURE — 25000128 H RX IP 250 OP 636

## 2017-03-31 PROCEDURE — 82805 BLOOD GASES W/O2 SATURATION: CPT | Performed by: ANESTHESIOLOGY

## 2017-03-31 PROCEDURE — 36600 WITHDRAWAL OF ARTERIAL BLOOD: CPT

## 2017-03-31 PROCEDURE — 40000275 ZZH STATISTIC RCP TIME EA 10 MIN

## 2017-03-31 PROCEDURE — 82805 BLOOD GASES W/O2 SATURATION: CPT | Performed by: PHYSICIAN ASSISTANT

## 2017-03-31 PROCEDURE — 25000128 H RX IP 250 OP 636: Performed by: PHYSICIAN ASSISTANT

## 2017-03-31 PROCEDURE — 25000128 H RX IP 250 OP 636: Performed by: ANESTHESIOLOGY

## 2017-03-31 PROCEDURE — 25000125 ZZHC RX 250: Performed by: INTERNAL MEDICINE

## 2017-03-31 PROCEDURE — 25000128 H RX IP 250 OP 636: Performed by: INTERNAL MEDICINE

## 2017-03-31 PROCEDURE — 25000125 ZZHC RX 250: Performed by: SURGERY

## 2017-03-31 PROCEDURE — 94640 AIRWAY INHALATION TREATMENT: CPT

## 2017-03-31 PROCEDURE — 25000132 ZZH RX MED GY IP 250 OP 250 PS 637: Performed by: PHYSICIAN ASSISTANT

## 2017-03-31 PROCEDURE — 25000125 ZZHC RX 250: Performed by: PHYSICIAN ASSISTANT

## 2017-03-31 RX ORDER — IPRATROPIUM BROMIDE AND ALBUTEROL SULFATE 2.5; .5 MG/3ML; MG/3ML
SOLUTION RESPIRATORY (INHALATION)
Status: COMPLETED
Start: 2017-03-31 | End: 2017-03-31

## 2017-03-31 RX ORDER — IPRATROPIUM BROMIDE AND ALBUTEROL SULFATE 2.5; .5 MG/3ML; MG/3ML
3 SOLUTION RESPIRATORY (INHALATION)
Status: DISCONTINUED | OUTPATIENT
Start: 2017-03-31 | End: 2017-04-09

## 2017-03-31 RX ORDER — FUROSEMIDE 10 MG/ML
40 INJECTION INTRAMUSCULAR; INTRAVENOUS ONCE
Status: COMPLETED | OUTPATIENT
Start: 2017-03-31 | End: 2017-03-31

## 2017-03-31 RX ORDER — LIDOCAINE HYDROCHLORIDE 40 MG/ML
30-50 SOLUTION TOPICAL DAILY PRN
Status: DISCONTINUED | OUTPATIENT
Start: 2017-03-31 | End: 2017-04-13 | Stop reason: HOSPADM

## 2017-03-31 RX ADMIN — PIPERACILLIN SODIUM,TAZOBACTAM SODIUM 3.38 G: 3; .375 INJECTION, POWDER, FOR SOLUTION INTRAVENOUS at 01:34

## 2017-03-31 RX ADMIN — PIPERACILLIN SODIUM,TAZOBACTAM SODIUM 3.38 G: 3; .375 INJECTION, POWDER, FOR SOLUTION INTRAVENOUS at 05:19

## 2017-03-31 RX ADMIN — PROPOFOL 75 MCG/KG/MIN: 10 INJECTION, EMULSION INTRAVENOUS at 12:49

## 2017-03-31 RX ADMIN — PANTOPRAZOLE SODIUM 40 MG: 40 INJECTION, POWDER, FOR SOLUTION INTRAVENOUS at 05:27

## 2017-03-31 RX ADMIN — PIPERACILLIN SODIUM,TAZOBACTAM SODIUM 3.38 G: 3; .375 INJECTION, POWDER, FOR SOLUTION INTRAVENOUS at 17:25

## 2017-03-31 RX ADMIN — PROPOFOL 75 MCG/KG/MIN: 10 INJECTION, EMULSION INTRAVENOUS at 01:45

## 2017-03-31 RX ADMIN — IPRATROPIUM BROMIDE AND ALBUTEROL SULFATE 3 ML: .5; 3 SOLUTION RESPIRATORY (INHALATION) at 11:34

## 2017-03-31 RX ADMIN — PROPOFOL 60 MCG/KG/MIN: 10 INJECTION, EMULSION INTRAVENOUS at 22:57

## 2017-03-31 RX ADMIN — PROPOFOL 50 MCG/KG/MIN: 10 INJECTION, EMULSION INTRAVENOUS at 15:01

## 2017-03-31 RX ADMIN — IPRATROPIUM BROMIDE AND ALBUTEROL SULFATE 3 ML: .5; 3 SOLUTION RESPIRATORY (INHALATION) at 15:11

## 2017-03-31 RX ADMIN — HEPARIN SODIUM 5000 UNITS: 10000 INJECTION, SOLUTION INTRAVENOUS; SUBCUTANEOUS at 17:25

## 2017-03-31 RX ADMIN — PROPOFOL 75 MCG/KG/MIN: 10 INJECTION, EMULSION INTRAVENOUS at 03:45

## 2017-03-31 RX ADMIN — CHLORHEXIDINE GLUCONATE 15 ML: 1.2 RINSE ORAL at 20:00

## 2017-03-31 RX ADMIN — CHLORHEXIDINE GLUCONATE 15 ML: 1.2 RINSE ORAL at 08:33

## 2017-03-31 RX ADMIN — HEPARIN SODIUM 5000 UNITS: 10000 INJECTION, SOLUTION INTRAVENOUS; SUBCUTANEOUS at 01:34

## 2017-03-31 RX ADMIN — PROPOFOL 75 MCG/KG/MIN: 10 INJECTION, EMULSION INTRAVENOUS at 10:53

## 2017-03-31 RX ADMIN — PROPOFOL 75 MCG/KG/MIN: 10 INJECTION, EMULSION INTRAVENOUS at 00:11

## 2017-03-31 RX ADMIN — SODIUM CHLORIDE 2 UNITS/HR: 9 INJECTION, SOLUTION INTRAVENOUS at 04:12

## 2017-03-31 RX ADMIN — FUROSEMIDE 40 MG: 10 INJECTION, SOLUTION INTRAVENOUS at 16:29

## 2017-03-31 RX ADMIN — FENTANYL CITRATE 25 MCG/HR: 50 INJECTION, SOLUTION INTRAMUSCULAR; INTRAVENOUS at 17:23

## 2017-03-31 RX ADMIN — PROPOFOL 75 MCG/KG/MIN: 10 INJECTION, EMULSION INTRAVENOUS at 09:12

## 2017-03-31 RX ADMIN — SODIUM CHLORIDE: 9 INJECTION, SOLUTION INTRAVENOUS at 10:54

## 2017-03-31 RX ADMIN — SODIUM CHLORIDE: 9 INJECTION, SOLUTION INTRAVENOUS at 22:57

## 2017-03-31 RX ADMIN — PIPERACILLIN SODIUM,TAZOBACTAM SODIUM 3.38 G: 3; .375 INJECTION, POWDER, FOR SOLUTION INTRAVENOUS at 11:47

## 2017-03-31 RX ADMIN — PROPOFOL 75 MCG/KG/MIN: 10 INJECTION, EMULSION INTRAVENOUS at 05:25

## 2017-03-31 RX ADMIN — CLINDAMYCIN PHOSPHATE 900 MG: 18 INJECTION, SOLUTION INTRAVENOUS at 04:11

## 2017-03-31 RX ADMIN — VANCOMYCIN HYDROCHLORIDE 1500 MG: 5 INJECTION, POWDER, LYOPHILIZED, FOR SOLUTION INTRAVENOUS at 05:19

## 2017-03-31 RX ADMIN — PROPOFOL 75 MCG/KG/MIN: 10 INJECTION, EMULSION INTRAVENOUS at 07:28

## 2017-03-31 RX ADMIN — IPRATROPIUM BROMIDE AND ALBUTEROL SULFATE 3 ML: .5; 3 SOLUTION RESPIRATORY (INHALATION) at 19:51

## 2017-03-31 RX ADMIN — PROPOFOL 60 MCG/KG/MIN: 10 INJECTION, EMULSION INTRAVENOUS at 20:43

## 2017-03-31 RX ADMIN — HEPARIN SODIUM 5000 UNITS: 10000 INJECTION, SOLUTION INTRAVENOUS; SUBCUTANEOUS at 08:33

## 2017-03-31 RX ADMIN — FUROSEMIDE 40 MG: 10 INJECTION, SOLUTION INTRAVENOUS at 20:00

## 2017-03-31 RX ADMIN — CALCIUM GLUCONATE 2 G: 94 INJECTION, SOLUTION INTRAVENOUS at 00:17

## 2017-03-31 RX ADMIN — PROPOFOL 60 MCG/KG/MIN: 10 INJECTION, EMULSION INTRAVENOUS at 17:40

## 2017-03-31 RX ADMIN — FENTANYL CITRATE 50 MCG: 50 INJECTION, SOLUTION INTRAMUSCULAR; INTRAVENOUS at 15:00

## 2017-03-31 NOTE — PROGRESS NOTES
[facility] ICU RESPIRATORY NOTE  Date of Admission:3/29/2017  Date of Intubation (most recent):3/29/2017  Reason for Mechanical Ventilation: Airway Protection  Number of Days on Mechanical Ventilation:3  Met Criteria for Pressure Support Trial: no  Length of Pressure Support Trial:  Reason for Stopping Pressure Support Trial:  Reason for No Pressure Support Trial: per MD          Recent Labs  Lab 03/29/17  1515   PH 7.36   PCO2 41   PO2 79*   HCO3 23   O2PER 70%        Ventilation Mode: CMV/AC  FiO2 (%): 50 %  Rate Set (breaths/minute): 15 breaths/min  Tidal Volume Set (mL): 600 mL  PEEP (cm H2O): 8 cmH2O  Oxygen Concentration (%): 50 %  Resp: 17   3/31/2017  Nitesh Pino

## 2017-03-31 NOTE — PLAN OF CARE
Problem: Individualization  Goal: Patient Preferences  Outcome: No Change  Pt remains vented, norepi weaned off, sedated with propofol and fentanyl.  Right groin dressing changed at bedside with surgeon, PA and RN.  No OR planned; daily dressing changes over the weekend by Surgery PA.  Plan for wound vac Mon in OR.  Pt's sedation lightened after dressing change, followed commands weakly.  ABG not suitable for weaning; lasix given.  Will repeat lasix dose this evening and re-assess in the morning for extubation.  Pt's mom here, updated by RN.  Will continue to monitor closely.

## 2017-03-31 NOTE — PROGRESS NOTES
"General Surgery Progress Note    Subjective: pt intubated and sedated, on small amt of levophed, high dose propofol for sedation. PEEP 8. FIo2 50%    Objective: /65  Pulse 102  Temp 99.9  F (37.7  C)  Resp 17  Ht 5' 8\" (1.727 m)  Wt 277 lb 5.4 oz (125.8 kg)  SpO2 95%  BMI 42.17 kg/m2  Gen: sedated  CV: tachycardic  Pulm:mech vent  Abd: soft, right groin with dressings in place are c/d/i - wound clean under dressings  Ext: WWP    Assessment/Plan:   Caro Landis  is a 36 year old female POD1 and 2 s/p excisional debridement right groin necrotizing soft tissue infection with GPC, GNR on initial cultures. Wound clean  - plan for bedside dressing change today with wet to dry kerlix dressing  - ok to wean sedation with goal for extubation from surgical perspective  - start tube feeds if unable to extubate  - wet to dry dressings over the weekend with plan for wound vac on Monday  - will d/c clindamycin as no GPR seen.     Mary Beth Neal MD  [facility]  [phone]              "

## 2017-03-31 NOTE — PROGRESS NOTES
[facility]    Critical Care Service  Progress Note    Date of Service (when I saw the patient): 03/31/2017     Assessment & Plan   Caro Landis is a 36 year old female with PMHx of PCOS and HLD who presents to [facility] ED on 3/29 with a swollen painful R groin found to have necrotizing fasciitis now s/p debridement in the OR on 3/29. The patient was admitted to the ICU due to hypotensive from presumed septic shock. She remains intubated after going to the OR for washouts.       Neuro  1. Acute pain  2. Sedation  Plan:  -- fentanyl bumps for pain management 25-50mcg/1hr   -- Propofol for sedation as needed until extubation      CV  1. Hypotension, likely septic shock in the setting of necrotizing fascitis. Random cortisol levels low but given that pressor needs have not increased, okay to hold off of stress dose steroids   2. Hypertriglyceridemia, likely related to PCOS  Plan:  -- Wean NE for blood pressure support as able, should be able to D/C once propofol is off   -- trend LA and CBC/BMP       Resp:  1. Acute respiratory failure, post-operatively secondary to acute illness. Remains intubated post operatively for airway protection. Scattered inspiratory and expiratory wheeze on auscultation   Plan:  -- AC ventilation   -- PST planned today after wound washout   -- Add duonebs scheduled today       GI/Nutrition  1. No prior hx  Plan:  -- NPO  -- PPI      Renal  1. Hyponatremia. Typically occurs with severe necrotizing fascitis, resolved after NS boluses and IVFs   2. No prior hx. Baseline appears to be 0.6. Cr jumped to 1.0 but UOP adequate   3. Hypocalcemia  Plan:  -- Monitor Cr  -- wong for strict I&Os       ID  1. Necrotizing fascitis of R groin now s/p debridement in the OR on 3/29 and 3/30. Gram stain shows moderate gram positive cocci and gram negative rods. CT scan on admission shows extensive gas pattern in R groin suggestive of necrotizing fasciitis   1. Currently afebrile with a  down-trending leukocytosis and normal LA  Plan:  -- continue zosyn,clindamycin and vanc for necrotizing fascitis   -- follow up blood culture and wound culture results   -- Plan for wound vac placement on 4/2       Endocrine  1. Stress Hyperglycemia, likely related to necrotizing fascitis, resolving   2. PCOS. Previously on metformin and spironolactone   3. Impaired fasting glucose per chart review. HgbA1c 9.6 on admission   Plan:  -- Insulin gtt per protocol if indicated  -- Keep BG <180 for optimal healing      Heme:  1. Acute blood loss anemia. Hgb 11.6  Plan:  -- Monitor hemoglobin.  -- Transfuse to keep > 7.0      MSK  1. Necrotizing fascitis, R leg and groin  Plan:  -- management plan per above       Skin  1. Redness of the face without apparent angioedema, some swelling of eyelids- resolved   2. Wound on R groin, necrotizing fascitis- erythema has not expanded past previously demarcated area   Plan:  -- wound care- wet to try  -- will continue to monitor facial rash       General cares:  DVT Prophylaxis: Heparin SQ   GI Prophylaxis: PPI  Restraints: Restraints for medical healing needed: NO  Family update by me today: No  Current lines are required for patient management  Access:  IJ L 3/29      Rosario Ward PA-C  Pager: [phone]    Time Spent on this Encounter   Billing:  I spent 60 minutes bedside and on the inpatient unit today managing the critical care of Caro Landis in relation to the issues listed in this note.    Main Plans for Today   - plans for washout today  - PS after washout  - add duonebs     Interval History   Course reviewed. No acute events overnight. The patient went to the OR yesterday for a second debridement. The surgeons felt successful in debridement. The patient has remained intubated until a washout today. ROS cannot be obtained.     Physical Exam   Temp: 100  F (37.8  C) Temp src: Esophageal Temp  Min: 98.1  F (36.7  C)  Max: 100  F (37.8  C) BP: 123/70   Heart Rate: 77 Resp: 16  SpO2: 94 % O2 Device: Mechanical Ventilator    Vitals:    03/29/17 0927 03/31/17 0400   Weight: 114.3 kg (252 lb) 125.8 kg (277 lb 5.4 oz)     I/O last 3 completed shifts:  In: 4691.46 [I.V.:4691.46]  Out: 2055 [Urine:1900; Emesis/NG output:150; Blood:5]    GEN:  female, resting in bed, sedated  HEENT: Normocephalic, atraumatic, trachea midline, ETT secure, pupils equal and reactive   CV: RRR, no gallops, rubs, or murmurs  PULM/CHEST: Course upper airway sounds, scattered wheezing, no rhonchi, crackles or wheeze, symmetric chest rise   GI: normal bowel sounds, soft, non-tender, no rebound tenderness or guarding  : wong catheter in place, urine yellow and clear  EXTREMITIES: no peripheral edema, peripheral pulses intact  NEURO: sedated on exam  SKIN: gauze in wound in R groin, bandages appear clean, erythema has not extended past area of de-lineation from yesterday   Imaging personally reviewed: no new imaging   ECG-- NSR on tele     Medications     propofol (DIPRIVAN) infusion 75 mcg/kg/min (03/31/17 1053)     norepinephrine 0.03 mcg/kg/min (03/31/17 0800)     NaCl 75 mL/hr at 03/31/17 1054     IV fluid REPLACEMENT ONLY       insulin (regular) 2 Units/hr (03/31/17 0800)     fentaNYL 25 mcg/hr (03/31/17 0800)       vancomycin place sandoval - receiving intermittent dosing  1 each Does not apply See Admin Instructions     pantoprazole  40 mg Intravenous QAM AC     chlorhexidine  15 mL Mouth/Throat Q12H     heparin  5,000 Units Subcutaneous Q8H     piperacillin-tazobactam  3.375 g Intravenous Q6H       Data     Recent Labs  Lab 03/31/17  0400 03/30/17  2217 03/30/17  1355  03/29/17  1515 03/29/17  1000   WBC 12.3* 13.5* 15.5*  < > 17.6* 17.2*   HGB 10.9* 11.0* 11.1*  < > 10.8* 13.9   MCV 85 86 85  < > 83 82    291 272  < > 244 279   INR  --   --   --   --  1.29*  --    * 146* 145*  < > 137 130*   POTASSIUM 3.7 3.9 3.9  < > 3.5 3.9   CHLORIDE 112* 113* 113*  < > 105 95   CO2 23 23 24  < > 23 26    BUN 5* 5* 4*  < > 5* 6*   CR 1.60* 1.42* 1.12*  < > 0.58 0.66   ANIONGAP 10 10 8  < > 9 9   ROBERT 7.5* 7.1* 7.2*  < > 6.8* 7.9*   * 124* 134*  < > 213* 356*   ALBUMIN  --   --   --   --  1.7* 2.3*   PROTTOTAL  --   --   --   --  5.3* 7.1   BILITOTAL  --   --   --   --  0.7 0.9   ALKPHOS  --   --   --   --  57 83   ALT  --   --   --   --  11 18   AST  --   --   --   --  10 8   < > = values in this interval not displayed.  Recent Results (from the past 24 hour(s))   XR Chest Port 1 View    Narrative    XR CHEST PORT 1 VW 3/30/2017 3:58 PM    COMPARISON: Radiograph earlier on the same day.    HISTORY: Endotracheal tube placement.      Impression    IMPRESSION: Endotracheal tube tip approximately 2.5 cm above the  wendie. Left internal jugular central venous catheter tip in the low  SVC. No pneumothorax.    SABRINA BEACH   XR Abdomen Port 1 View    Narrative    ABDOMEN ONE VIEW PORTABLE  3/30/2017 3:59 PM     HISTORY: OG placement    COMPARISON: None.       Impression    IMPRESSION: Orogastric tube tip projects in expected location of the  antrum of the stomach.    SEBASTIÁN OLIVO MD

## 2017-03-31 NOTE — PROGRESS NOTES
[facility] ICU RESPIRATORY NOTE  Date of Admission:3/29/2017  Date of Intubation (most recent):3/29/2017  Reason for Mechanical Ventilation: Airway Protection  Number of Days on Mechanical Ventilation:3  Met Criteria for Pressure Support Trial: no  Length of Pressure Support Trial:  Reason for Stopping Pressure Support Trial:  Reason for No Pressure Support Trial: per MD      Recent Labs  Lab 03/29/17  1515   PH 7.36   PCO2 41   PO2 79*   HCO3 23   O2PER 70%     Ventilation Mode: CMV/AC  FiO2 (%): 50 %  Rate Set (breaths/minute): 15 breaths/min  Tidal Volume Set (mL): 600 mL  PEEP (cm H2O): 8 cmH2O  Oxygen Concentration (%): 50 %  Resp: 18     Ger Dhaliwal RT

## 2017-03-31 NOTE — PLAN OF CARE
Problem: Goal Outcome Summary  Goal: Goal Outcome Summary  Outcome: No Change  Pt remains on full vent support with Propofol and Fentanyl. Withdraws from pain, does not open her eyes. Hemodynamically stable on Levo gtt, weaning. Insulin gtt. UO adequate. Calcium replaced. Plan for possible follow-up I/D today of right groin wound. Will continue to monitor.

## 2017-03-31 NOTE — PHARMACY-VANCOMYCIN DOSING SERVICE
Pharmacy Vancomycin Note  Date of Service 2017  Patient's  1980   36 year old, female    Indication: Skin and Soft Tissue Infection  Goal Trough Level: 15-20 mg/L  Day of Therapy: 3  Current Vancomycin regimen:  1500 mg IV q8h    Current estimated CrCl = Estimated Creatinine Clearance: 66.8 mL/min (based on Cr of 1.6).    Creatinine for last 3 days  3/29/2017: 10:00 AM Creatinine 0.66 mg/dL;  3:15 PM Creatinine 0.58 mg/dL;  6:05 PM Creatinine 0.52 mg/dL; 10:10 PM Creatinine 0.48 mg/dL  3/30/2017:  3:00 AM Creatinine 0.60 mg/dL;  6:11 AM Creatinine 0.60 mg/dL; 10:16 AM Creatinine 0.96 mg/dL;  1:55 PM Creatinine 1.12 mg/dL; 10:17 PM Creatinine 1.42 mg/dL  3/31/2017:  4:00 AM Creatinine 1.60 mg/dL    Recent Vancomycin Levels (past 3 days)  3/30/2017: 10:16 AM Vancomycin Level 16.1 mg/L  3/31/2017: 12:40 PM Vancomycin Level 33.5 mg/L    Vancomycin IV Administrations (past 72 hours)                   vancomycin (VANCOCIN) 1500 mg in 0.9% NaCl 250 mL PREMIX (mg) 1,500 mg New Bag 17 0519     1,500 mg New Bag 178     1,500 mg New Bag  0314     1,500 mg New Bag 179                Nephrotoxins and other renal medications (Future)    Start     Dose/Rate Route Frequency Ordered Stop    17 0821  vancomycin place sandoval - receiving intermittent dosing      1 each Does not apply SEE ADMIN INSTRUCTIONS 17 0823      17 1800  piperacillin-tazobactam (ZOSYN) 3.375 g vial to attach to  mL bag     Comments:  Pharmacy can adjust dose based on renal function.    3.375 g  over 1 Hours Intravenous EVERY 6 HOURS 17 1721      17 1730  norepinephrine (LEVOPHED) 16 mg in D5W 250 mL infusion      0.03-0.4 mcg/kg/min × 114.3 kg  3.2-42.9 mL/hr  Intravenous CONTINUOUS 17 1721               Contrast Orders - past 72 hours (72h ago through future)    Start     Dose/Rate Route Frequency Ordered Stop    17 1049  iopamidol (ISOVUE-370) solution 123 mL      123  mL Intravenous ONCE 03/29/17 1049 03/29/17 1106          Interpretation of levels and current regimen:  Trough level is  Supratherapeutic    Has serum creatinine changed > 50% in last 72 hours: Yes    Urine output:  unable to determine    Renal Function: Worsening    Plan:  1.  Will change to intermittent dosing based on levels  2.  Pharmacy will check 4/1 AM  3. Serum creatinine levels will be ordered daily for the first week of therapy and at least twice weekly for subsequent weeks.      Mary Mendoza, PharmD           .

## 2017-04-01 ENCOUNTER — APPOINTMENT (OUTPATIENT)
Dept: GENERAL RADIOLOGY | Facility: CLINIC | Age: 37
DRG: 853 | End: 2017-04-01
Attending: ANESTHESIOLOGY
Payer: COMMERCIAL

## 2017-04-01 LAB
ANION GAP SERPL CALCULATED.3IONS-SCNC: 11 MMOL/L (ref 3–14)
ANION GAP SERPL CALCULATED.3IONS-SCNC: 12 MMOL/L (ref 3–14)
BASE DEFICIT BLDA-SCNC: 4.9 MMOL/L
BUN SERPL-MCNC: 10 MG/DL (ref 7–30)
BUN SERPL-MCNC: 9 MG/DL (ref 7–30)
CALCIUM SERPL-MCNC: 7.1 MG/DL (ref 8.5–10.1)
CALCIUM SERPL-MCNC: 7.2 MG/DL (ref 8.5–10.1)
CHLORIDE SERPL-SCNC: 113 MMOL/L (ref 94–109)
CHLORIDE SERPL-SCNC: 113 MMOL/L (ref 94–109)
CO2 SERPL-SCNC: 22 MMOL/L (ref 20–32)
CO2 SERPL-SCNC: 22 MMOL/L (ref 20–32)
CREAT SERPL-MCNC: 2.43 MG/DL (ref 0.52–1.04)
CREAT SERPL-MCNC: 2.47 MG/DL (ref 0.52–1.04)
CREAT UR-MCNC: 95 MG/DL
EOSINOPHIL SPEC QL WRIGHT STN: NORMAL
ERYTHROCYTE [DISTWIDTH] IN BLOOD BY AUTOMATED COUNT: 13.6 % (ref 10–15)
FRACT EXCRET NA UR+SERPL-RTO: 1.1 %
GFR SERPL CREATININE-BSD FRML MDRD: 22 ML/MIN/1.7M2
GFR SERPL CREATININE-BSD FRML MDRD: 22 ML/MIN/1.7M2
GLUCOSE BLDC GLUCOMTR-MCNC: 129 MG/DL (ref 70–99)
GLUCOSE BLDC GLUCOMTR-MCNC: 138 MG/DL (ref 70–99)
GLUCOSE BLDC GLUCOMTR-MCNC: 142 MG/DL (ref 70–99)
GLUCOSE BLDC GLUCOMTR-MCNC: 162 MG/DL (ref 70–99)
GLUCOSE BLDC GLUCOMTR-MCNC: 174 MG/DL (ref 70–99)
GLUCOSE BLDC GLUCOMTR-MCNC: 174 MG/DL (ref 70–99)
GLUCOSE SERPL-MCNC: 143 MG/DL (ref 70–99)
GLUCOSE SERPL-MCNC: 157 MG/DL (ref 70–99)
HCO3 BLD-SCNC: 21 MMOL/L (ref 21–28)
HCT VFR BLD AUTO: 29.8 % (ref 35–47)
HGB BLD-MCNC: 9.8 G/DL (ref 11.7–15.7)
MAGNESIUM SERPL-MCNC: 1.8 MG/DL (ref 1.6–2.3)
MCH RBC QN AUTO: 28.2 PG (ref 26.5–33)
MCHC RBC AUTO-ENTMCNC: 32.9 G/DL (ref 31.5–36.5)
MCV RBC AUTO: 86 FL (ref 78–100)
OXYHGB MFR BLD: 98 % (ref 92–100)
PCO2 BLD: 45 MM HG (ref 35–45)
PH BLD: 7.28 PH (ref 7.35–7.45)
PHOSPHATE SERPL-MCNC: 6.3 MG/DL (ref 2.5–4.5)
PLATELET # BLD AUTO: 279 10E9/L (ref 150–450)
PO2 BLD: 151 MM HG (ref 80–105)
POTASSIUM SERPL-SCNC: 3.8 MMOL/L (ref 3.4–5.3)
POTASSIUM SERPL-SCNC: 3.9 MMOL/L (ref 3.4–5.3)
RBC # BLD AUTO: 3.47 10E12/L (ref 3.8–5.2)
SODIUM SERPL-SCNC: 146 MMOL/L (ref 133–144)
SODIUM SERPL-SCNC: 147 MMOL/L (ref 133–144)
SODIUM UR-SCNC: 60 MMOL/L
SPECIMEN SOURCE: NORMAL
VANCOMYCIN SERPL-MCNC: 21.8 MG/L
WBC # BLD AUTO: 10 10E9/L (ref 4–11)

## 2017-04-01 PROCEDURE — 25000132 ZZH RX MED GY IP 250 OP 250 PS 637: Performed by: PHYSICIAN ASSISTANT

## 2017-04-01 PROCEDURE — 20000003 ZZH R&B ICU

## 2017-04-01 PROCEDURE — 94664 DEMO&/EVAL PT USE INHALER: CPT

## 2017-04-01 PROCEDURE — 94003 VENT MGMT INPAT SUBQ DAY: CPT

## 2017-04-01 PROCEDURE — 40000008 ZZH STATISTIC AIRWAY CARE

## 2017-04-01 PROCEDURE — 94640 AIRWAY INHALATION TREATMENT: CPT | Mod: 76

## 2017-04-01 PROCEDURE — 25000128 H RX IP 250 OP 636: Performed by: INTERNAL MEDICINE

## 2017-04-01 PROCEDURE — 89190 NASAL SMEAR FOR EOSINOPHILS: CPT | Performed by: INTERNAL MEDICINE

## 2017-04-01 PROCEDURE — 80202 ASSAY OF VANCOMYCIN: CPT | Performed by: PHYSICIAN ASSISTANT

## 2017-04-01 PROCEDURE — 80048 BASIC METABOLIC PNL TOTAL CA: CPT | Performed by: INTERNAL MEDICINE

## 2017-04-01 PROCEDURE — 25000128 H RX IP 250 OP 636: Performed by: PHYSICIAN ASSISTANT

## 2017-04-01 PROCEDURE — 40000275 ZZH STATISTIC RCP TIME EA 10 MIN

## 2017-04-01 PROCEDURE — 80048 BASIC METABOLIC PNL TOTAL CA: CPT | Performed by: PHYSICIAN ASSISTANT

## 2017-04-01 PROCEDURE — 82570 ASSAY OF URINE CREATININE: CPT | Performed by: INTERNAL MEDICINE

## 2017-04-01 PROCEDURE — 84100 ASSAY OF PHOSPHORUS: CPT | Performed by: PHYSICIAN ASSISTANT

## 2017-04-01 PROCEDURE — 94640 AIRWAY INHALATION TREATMENT: CPT

## 2017-04-01 PROCEDURE — 71010 XR CHEST PORT 1 VW: CPT

## 2017-04-01 PROCEDURE — 25000125 ZZHC RX 250: Performed by: PHYSICIAN ASSISTANT

## 2017-04-01 PROCEDURE — 83735 ASSAY OF MAGNESIUM: CPT | Performed by: PHYSICIAN ASSISTANT

## 2017-04-01 PROCEDURE — 00000146 ZZHCL STATISTIC GLUCOSE BY METER IP

## 2017-04-01 PROCEDURE — 82805 BLOOD GASES W/O2 SATURATION: CPT | Performed by: ANESTHESIOLOGY

## 2017-04-01 PROCEDURE — 85027 COMPLETE CBC AUTOMATED: CPT | Performed by: PHYSICIAN ASSISTANT

## 2017-04-01 PROCEDURE — 84300 ASSAY OF URINE SODIUM: CPT | Performed by: INTERNAL MEDICINE

## 2017-04-01 PROCEDURE — 25000131 ZZH RX MED GY IP 250 OP 636 PS 637: Performed by: INTERNAL MEDICINE

## 2017-04-01 RX ORDER — DEXTROSE MONOHYDRATE 25 G/50ML
25-50 INJECTION, SOLUTION INTRAVENOUS
Status: DISCONTINUED | OUTPATIENT
Start: 2017-04-01 | End: 2017-04-11

## 2017-04-01 RX ORDER — NICOTINE POLACRILEX 4 MG
15-30 LOZENGE BUCCAL
Status: DISCONTINUED | OUTPATIENT
Start: 2017-04-01 | End: 2017-04-11

## 2017-04-01 RX ADMIN — PROPOFOL 60 MCG/KG/MIN: 10 INJECTION, EMULSION INTRAVENOUS at 12:54

## 2017-04-01 RX ADMIN — MICONAZOLE NITRATE: 2 POWDER TOPICAL at 20:25

## 2017-04-01 RX ADMIN — PROPOFOL 60 MCG/KG/MIN: 10 INJECTION, EMULSION INTRAVENOUS at 03:31

## 2017-04-01 RX ADMIN — PROPOFOL 60 MCG/KG/MIN: 10 INJECTION, EMULSION INTRAVENOUS at 15:16

## 2017-04-01 RX ADMIN — IPRATROPIUM BROMIDE AND ALBUTEROL SULFATE 3 ML: .5; 3 SOLUTION RESPIRATORY (INHALATION) at 15:16

## 2017-04-01 RX ADMIN — IPRATROPIUM BROMIDE AND ALBUTEROL SULFATE 3 ML: .5; 3 SOLUTION RESPIRATORY (INHALATION) at 19:16

## 2017-04-01 RX ADMIN — HEPARIN SODIUM 5000 UNITS: 10000 INJECTION, SOLUTION INTRAVENOUS; SUBCUTANEOUS at 01:00

## 2017-04-01 RX ADMIN — INSULIN ASPART 1 UNITS: 100 INJECTION, SOLUTION INTRAVENOUS; SUBCUTANEOUS at 23:26

## 2017-04-01 RX ADMIN — CHLORHEXIDINE GLUCONATE 15 ML: 1.2 RINSE ORAL at 08:33

## 2017-04-01 RX ADMIN — PROPOFOL 60 MCG/KG/MIN: 10 INJECTION, EMULSION INTRAVENOUS at 01:00

## 2017-04-01 RX ADMIN — IPRATROPIUM BROMIDE AND ALBUTEROL SULFATE 3 ML: .5; 3 SOLUTION RESPIRATORY (INHALATION) at 12:11

## 2017-04-01 RX ADMIN — HEPARIN SODIUM 5000 UNITS: 10000 INJECTION, SOLUTION INTRAVENOUS; SUBCUTANEOUS at 22:06

## 2017-04-01 RX ADMIN — PROPOFOL 60 MCG/KG/MIN: 10 INJECTION, EMULSION INTRAVENOUS at 19:54

## 2017-04-01 RX ADMIN — PROPOFOL 60 MCG/KG/MIN: 10 INJECTION, EMULSION INTRAVENOUS at 05:44

## 2017-04-01 RX ADMIN — IPRATROPIUM BROMIDE AND ALBUTEROL SULFATE 3 ML: .5; 3 SOLUTION RESPIRATORY (INHALATION) at 07:20

## 2017-04-01 RX ADMIN — PROPOFOL 60 MCG/KG/MIN: 10 INJECTION, EMULSION INTRAVENOUS at 08:29

## 2017-04-01 RX ADMIN — PIPERACILLIN SODIUM,TAZOBACTAM SODIUM 3.38 G: 3; .375 INJECTION, POWDER, FOR SOLUTION INTRAVENOUS at 00:01

## 2017-04-01 RX ADMIN — PROPOFOL 60 MCG/KG/MIN: 10 INJECTION, EMULSION INTRAVENOUS at 22:13

## 2017-04-01 RX ADMIN — PIPERACILLIN SODIUM,TAZOBACTAM SODIUM 3.38 G: 3; .375 INJECTION, POWDER, FOR SOLUTION INTRAVENOUS at 17:29

## 2017-04-01 RX ADMIN — HEPARIN SODIUM 5000 UNITS: 10000 INJECTION, SOLUTION INTRAVENOUS; SUBCUTANEOUS at 08:35

## 2017-04-01 RX ADMIN — PROPOFOL 60 MCG/KG/MIN: 10 INJECTION, EMULSION INTRAVENOUS at 17:27

## 2017-04-01 RX ADMIN — INSULIN ASPART 2 UNITS: 100 INJECTION, SOLUTION INTRAVENOUS; SUBCUTANEOUS at 20:23

## 2017-04-01 RX ADMIN — PIPERACILLIN SODIUM,TAZOBACTAM SODIUM 3.38 G: 3; .375 INJECTION, POWDER, FOR SOLUTION INTRAVENOUS at 23:24

## 2017-04-01 RX ADMIN — SODIUM CHLORIDE: 9 INJECTION, SOLUTION INTRAVENOUS at 11:42

## 2017-04-01 RX ADMIN — DAPTOMYCIN 750 MG: 500 INJECTION, POWDER, LYOPHILIZED, FOR SOLUTION INTRAVENOUS at 14:16

## 2017-04-01 RX ADMIN — CHLORHEXIDINE GLUCONATE 15 ML: 1.2 RINSE ORAL at 19:54

## 2017-04-01 RX ADMIN — PROPOFOL 60 MCG/KG/MIN: 10 INJECTION, EMULSION INTRAVENOUS at 10:35

## 2017-04-01 RX ADMIN — PIPERACILLIN SODIUM,TAZOBACTAM SODIUM 3.38 G: 3; .375 INJECTION, POWDER, FOR SOLUTION INTRAVENOUS at 13:02

## 2017-04-01 RX ADMIN — PANTOPRAZOLE SODIUM 40 MG: 40 INJECTION, POWDER, FOR SOLUTION INTRAVENOUS at 08:31

## 2017-04-01 RX ADMIN — PIPERACILLIN SODIUM,TAZOBACTAM SODIUM 3.38 G: 3; .375 INJECTION, POWDER, FOR SOLUTION INTRAVENOUS at 06:23

## 2017-04-01 NOTE — PROGRESS NOTES
[facility]    General Surgery  Daily Post-Op Note    Assessment & Plan   Procedure(s):  S/p excisional debridement of right groin for necrotizing soft tissue infection 3/29  S/p right groin exploration with excisional debridement of necrotic tissue 3/20    Plan:  - continue wt to dry dressing change, plan for wound vac in OR on Mon  - ok to wean sedation with goal for extubation from surgical perspective  - continue Zosyn, Daptomycin  - nystatin to emre area for yeast around catheter    Nedra Barlow PA-C    Interval History   Pt intubated and sedated.    Tolerated bedside change change while sedated  Working on weaning    Physical Exam   Temp: 97.9  F (36.6  C) Temp src: Esophageal BP: 118/62   Heart Rate: 81 Resp: 15 SpO2: 94 % O2 Device: Mechanical Ventilator    Vitals:    03/29/17 0927 03/31/17 0400 04/01/17 0600   Weight: 114.3 kg (252 lb) 125.8 kg (277 lb 5.4 oz) 125.3 kg (276 lb 3.8 oz)     Vital Signs with Ranges  Temp:  [97.3  F (36.3  C)-101.1  F (38.4  C)] 97.9  F (36.6  C)  Heart Rate:  [69-90] 81  Resp:  [12-29] 15  BP: ()/(53-72) 118/62  FiO2 (%):  [50 %-60 %] 60 %  SpO2:  [87 %-98 %] 94 %  I/O last 3 completed shifts:  In: 3059.25 [I.V.:3059.25]  Out: 4075 [Urine:3875; Emesis/NG output:200]  Constitutional: intubated and sedated   Groin - no surrounding redness, wound base with beefy red tissue, looks clean

## 2017-04-01 NOTE — PLAN OF CARE
Problem: Goal Outcome Summary  Goal: Goal Outcome Summary  Outcome: No Change  Remains vented and sedated.  When lightened POWERS spontaneous but not to command.  Lungs are mostly clear w min secretions.  Dr Tomlin tried to wean FiO2 and peep but pt's sats dropped into low 80s so recruitment maneuver done and pt now on 15 peep and 50% FiO2. Pink tissue seen when surgeon did dressing change w scant sero-sanginous drainage.  UO good.  VSS.  Mother at bedside and understands POC,

## 2017-04-01 NOTE — PLAN OF CARE
Dr Sibley had already been notified that pt did not tolerate having fiO2 abd peep decreased and had desatted down to 84%  so pt is back where they were at 50% and 8 peep.  Suctioned for scant amt creamy thin secretions. Lungs sound diminished.  Pt is still desatting down to 86%. Dr Sibley came to room and did recruitment maneuver and then left peep at 10.  Sats are 92 now.  Monitoring closely,

## 2017-04-01 NOTE — PLAN OF CARE
Ema waveform flat--cannot draw labs.  When tried to move wrist/pull back on catheter, thumb turned white.  Ema schwartz'd per DR Tomlin.

## 2017-04-01 NOTE — PLAN OF CARE
Problem: Sepsis (Adult)  Goal: Signs and Symptoms of Listed Potential Problems Will be Absent or Manageable (Sepsis)  Signs and symptoms of listed potential problems will be absent or manageable by discharge/transition of care (reference Sepsis (Adult) CPG).   Outcome: No Change  VSS, remains sedated on propofol and fentanyl, withdraws to painful stimuli.  Good UOP from wong.  Abnormal AM labs reported to tele hub, no changes made at this time.  Continue to monitor closely.

## 2017-04-01 NOTE — PROGRESS NOTES
Pt has intermittent dis saturation, despite increased PEEP, and FiO2. Thus. Peep recruitment increment and decrement applied, Pt best vital numbers was on Peep of 15, RR 18, Vt 620, Cdyn 63, Spo2 94, HR 80, /62. Pt seem to be stable from respiratory stand point, Bed side Nurse Aware. Will follow.4/1/2017  [place] Bravo

## 2017-04-01 NOTE — PROGRESS NOTES
Pt on full ventilatory support. Stable from respiratory stand point. PEEP Increased to 15 cmh2o this shift after peep recruitement , No wean. Will follow up.4/1/2017  [place] Bravo

## 2017-04-01 NOTE — PROGRESS NOTES
[facility]    Critical Care Service  Progress Note    Date of Service (when I saw the patient): 04/01/2017     Assessment & Plan   Caro Landis is a 36 year old female with PMHx of PCOS and HLD who [facility] to [facility] ED on 3/29 with a swollen painful R groin found to have necrotizing fasciitis now s/p debridement in the OR on 3/29. The patient was admitted to the ICU due to hypotensive from presumed septic shock. She remains intubated after going to the OR for washouts.       Neuro  1. Acute pain  2. Sedation  Plan:  -- fentanyl bumps for pain management 25-50mcg/1hr   -- Propofol for sedation as needed until extubation      CV  1. Hypotension, likely septic shock in the setting of necrotizing fascitis. Random cortisol levels low but given that pressor needs have not increased, okay to hold off of stress dose steroids   2. Hypertriglyceridemia, likely related to PCOS  Plan:  -- Wean NE for blood pressure support as able, should be able to D/C once propofol is off   -- trend LA and CBC/BMP       Resp:  1. Acute respiratory failure, post-operatively secondary to acute illness. Remains intubated post operatively for airway protection. Scattered inspiratory and expiratory wheeze on auscultation. Still on PEEP of 8 and FiO2 50%.  Intermittently high Paw, on duonebs.  Plan:  -- Decrease PEEP to 5 and FiO2 to 40%   -- PST trial later today, assess for extubation  -- Continue duonebs scheduled        GI/Nutrition  1. No prior hx  Plan:  -- NPO  -- PPI      Renal  1. Hyponatremia. Typically occurs with severe necrotizing fascitis, resolved after NS boluses and IVFs   2. No prior hx. Baseline appears to be 0.6, creatinine increased to 2.47.  May be secondary to contrast nephropathy, drugs (Vancomycin); UOP adequate so less likely pre-renal state.  May be ATN from hypotension on admission, need to evaluate for AIN; no reason for crystal induced nephropathy.    3. Hypocalcemia  Plan:  -- Recheck creatinine this  afternoon  -- FeNa and urine eosionophil smear for AIN  -- Follow HCO3  -- Continue to monitor I&Os  -- If creatinine continues to increase will consult Nephrology   -- Hold lasix      ID  1. Necrotizing fascitis of R groin now s/p debridement in the OR on 3/29 and 3/30. Gram stain shows moderate gram positive cocci and gram negative rods. CT scan on admission shows extensive gas pattern in R groin suggestive of necrotizing fasciitis   1. Currently afebrile with a down-trending leukocytosis and normal LA  2. Culture with Prevotella  3. Clindamycin d/c'd yesterday  Plan:  -- continue zosyn for necrotizing fascitis; will d/c vanco with increasing creatinine, start Daptomycin   -- follow up blood culture and wound culture results   -- Plan for wound vac placement on 4/2       Endocrine  1. Stress Hyperglycemia, likely related to necrotizing fascitis, resolving   2. PCOS. Previously on metformin and spironolactone   3. Impaired fasting glucose per chart review. HgbA1c 9.6 on admission   Plan:  -- Insulin gtt per protocol if indicated  -- Keep BG <180 for optimal healing      Heme:  1. Acute blood loss anemia. Hgb 11.6  Plan:  -- Monitor hemoglobin.  -- Transfuse to keep > 7.0      MSK  1. Necrotizing fascitis, R leg and groin  Plan:  -- management plan per above       Skin  1. Redness of the face without apparent angioedema, some swelling of eyelids- resolved   2. Wound on R groin, necrotizing fascitis- erythema has not expanded past previously demarcated area   Plan:  -- wound care- wet to dry  -- will continue to monitor facial rash       General cares:  DVT Prophylaxis: Heparin SQ   GI Prophylaxis: PPI  Restraints: Restraints for medical healing needed: NO  Family update by me today: No  Current lines are required for patient management  Access:  IJ L 3/29      Madison Tomlin MD  Pager: [phone]    Time Spent on this Encounter   Billing:  I spent 30 minutes bedside and on the inpatient unit today managing the critical  care of Caro Landis in relation to the issues listed in this note.    Main Plans for Today   - Decrease PEEP and FiO2  - Vent wean trials today  - FeNa, recheck creatinine later today, follow UOP.  - Plan for repeat washout next week    Interval History   Course reviewed. No acute events overnight. The patient went to the OR yesterday for a second debridement. The surgeons felt successful in debridement. The patient has remained intubated overnight. Still on PEEP of 8 and FiO2 50%.  Increasing creatinine with good UOP and normal creatinine.    Physical Exam   Temp: 97.7  F (36.5  C) Temp src: Esophageal Temp  Min: 97.7  F (36.5  C)  Max: 101.1  F (38.4  C) BP: 109/57   Heart Rate: 76 Resp: 13 SpO2: 96 % O2 Device: Mechanical Ventilator    Vitals:    03/29/17 0927 03/31/17 0400 04/01/17 0600   Weight: 114.3 kg (252 lb) 125.8 kg (277 lb 5.4 oz) 125.3 kg (276 lb 3.8 oz)     I/O last 3 completed shifts:  In: 3059.25 [I.V.:3059.25]  Out: 4075 [Urine:3875; Emesis/NG output:200]    GEN:  female, resting in bed, sedated  HEENT: Normocephalic, atraumatic, trachea midline, ETT secure, pupils equal and reactive   CV: RRR, no gallops, rubs, or murmurs  PULM/CHEST: Scattered course upper airway sounds, no rhonchi, crackles or wheeze, symmetric chest rise   GI: normal bowel sounds, soft, non-tender, no rebound tenderness or guarding  : wong catheter in place, urine yellow and clear  EXTREMITIES: no peripheral edema, peripheral pulses intact  NEURO: sedated on exam  SKIN: gauze in wound in R groin, bandages appear clean, erythema has not extended past area of de-lineation from yesterday   Imaging personally reviewed: no new imaging   ECG-- NSR on tele     Medications     propofol (DIPRIVAN) infusion 60 mcg/kg/min (04/01/17 0829)     norepinephrine Stopped (03/31/17 1400)     NaCl 75 mL/hr at 03/31/17 0497     IV fluid REPLACEMENT ONLY       insulin (regular) Stopped (03/31/17 1534)     fentaNYL 25 mcg/hr (04/01/17  0800)       DAPTOmycin (CUBICIN) intermittent infusion  6 mg/kg Intravenous Q24H     ipratropium - albuterol 0.5 mg/2.5 mg/3 mL  3 mL Nebulization 4x daily     pantoprazole  40 mg Intravenous QAM AC     chlorhexidine  15 mL Mouth/Throat Q12H     heparin  5,000 Units Subcutaneous Q8H     piperacillin-tazobactam  3.375 g Intravenous Q6H       Data     Recent Labs  Lab 04/01/17  0445 03/31/17  2200 03/31/17  0400 03/30/17  2217  03/29/17  1515 03/29/17  1000   WBC 10.0  --  12.3* 13.5*  < > 17.6* 17.2*   HGB 9.8*  --  10.9* 11.0*  < > 10.8* 13.9   MCV 86  --  85 86  < > 83 82     --  301 291  < > 244 279   INR  --   --   --   --   --  1.29*  --    *  --  145* 146*  < > 137 130*   POTASSIUM 3.9 4.0 3.7 3.9  < > 3.5 3.9   CHLORIDE 113*  --  112* 113*  < > 105 95   CO2 22  --  23 23  < > 23 26   BUN 9  --  5* 5*  < > 5* 6*   CR 2.47*  --  1.60* 1.42*  < > 0.58 0.66   ANIONGAP 12  --  10 10  < > 9 9   ROBERT 7.2*  --  7.5* 7.1*  < > 6.8* 7.9*   *  --  120* 124*  < > 213* 356*   ALBUMIN  --   --   --   --   --  1.7* 2.3*   PROTTOTAL  --   --   --   --   --  5.3* 7.1   BILITOTAL  --   --   --   --   --  0.7 0.9   ALKPHOS  --   --   --   --   --  57 83   ALT  --   --   --   --   --  11 18   AST  --   --   --   --   --  10 8   < > = values in this interval not displayed.  Recent Results (from the past 24 hour(s))   XR Chest Port 1 View    Narrative    CHEST PORTABLE ONE VIEW 4/1/2017 5:40 AM     COMPARISON: Frontal chest x-ray 3/30/2017.    HISTORY: Intubated/difficulty weaning.    FINDINGS: Tip of the endotracheal tube remains at the level of the  clavicular heads. Nasogastric tube again noted. A left internal  jugular central venous catheter with its tip in the mid superior vena  cava is again noted. The cardiac silhouette, pulmonary vasculature,  lungs and pleural spaces are within normal limits.      Impression    IMPRESSION: Clear lungs.

## 2017-04-01 NOTE — PROGRESS NOTES
[facility] ICU RESPIRATORY NOTE  Date of Admission:3/29/2017  Date of Intubation (most recent):3/29/2017  Reason for Mechanical Ventilation: Airway Protection  Number of Days on Mechanical Ventilation:4  Met Criteria for Pressure Support Trial: no  Length of Pressure Support Trial:  Reason for Stopping Pressure Support Trial:  Reason for No Pressure Support Trial: per MD    Pt remains on the following vent settings:    Ventilation Mode: CMV/AC  FiO2 (%): 60 %  Rate Set (breaths/minute): 15 breaths/min  Tidal Volume Set (mL): 600 mL  PEEP (cm H2O): 8 cmH2O  Oxygen Concentration (%): 60 %  Resp: 13      Recent Labs  Lab 04/01/17  0445 03/31/17  1747 03/31/17  1500 03/29/17  1515   PH 7.28* 7.28* 7.27* 7.36   PCO2 45 41 47* 41   PO2 151* 73* 73* 79*   HCO3 21 20* 21 23   O2PER  --   --  50% 70%     Plan: Cont full ventilatory support. Assess for weaning ability. Will cont to follow.     4/1/2017  Cassandra Benítez RRT

## 2017-04-02 LAB
BACTERIA SPEC CULT: ABNORMAL
CREAT SERPL-MCNC: 2.4 MG/DL (ref 0.52–1.04)
GFR SERPL CREATININE-BSD FRML MDRD: 22 ML/MIN/1.7M2
GLUCOSE BLDC GLUCOMTR-MCNC: 122 MG/DL (ref 70–99)
GLUCOSE BLDC GLUCOMTR-MCNC: 135 MG/DL (ref 70–99)
GLUCOSE BLDC GLUCOMTR-MCNC: 137 MG/DL (ref 70–99)
GLUCOSE BLDC GLUCOMTR-MCNC: 141 MG/DL (ref 70–99)
GLUCOSE BLDC GLUCOMTR-MCNC: 163 MG/DL (ref 70–99)
HBA1C MFR BLD: 10.4 % (ref 4.3–6)
Lab: ABNORMAL
MAGNESIUM SERPL-MCNC: 2.1 MG/DL (ref 1.6–2.3)
MICRO REPORT STATUS: ABNORMAL
PHOSPHATE SERPL-MCNC: 4.4 MG/DL (ref 2.5–4.5)
SPECIMEN SOURCE: ABNORMAL

## 2017-04-02 PROCEDURE — 99291 CRITICAL CARE FIRST HOUR: CPT | Performed by: INTERNAL MEDICINE

## 2017-04-02 PROCEDURE — 94640 AIRWAY INHALATION TREATMENT: CPT

## 2017-04-02 PROCEDURE — 94003 VENT MGMT INPAT SUBQ DAY: CPT

## 2017-04-02 PROCEDURE — 82565 ASSAY OF CREATININE: CPT | Performed by: PHYSICIAN ASSISTANT

## 2017-04-02 PROCEDURE — 00000146 ZZHCL STATISTIC GLUCOSE BY METER IP

## 2017-04-02 PROCEDURE — 25000125 ZZHC RX 250: Performed by: PHYSICIAN ASSISTANT

## 2017-04-02 PROCEDURE — 25000128 H RX IP 250 OP 636: Performed by: PHYSICIAN ASSISTANT

## 2017-04-02 PROCEDURE — 40000008 ZZH STATISTIC AIRWAY CARE

## 2017-04-02 PROCEDURE — 83735 ASSAY OF MAGNESIUM: CPT | Performed by: PHYSICIAN ASSISTANT

## 2017-04-02 PROCEDURE — 40000275 ZZH STATISTIC RCP TIME EA 10 MIN

## 2017-04-02 PROCEDURE — 25000128 H RX IP 250 OP 636: Performed by: INTERNAL MEDICINE

## 2017-04-02 PROCEDURE — 20000003 ZZH R&B ICU

## 2017-04-02 PROCEDURE — 94640 AIRWAY INHALATION TREATMENT: CPT | Mod: 76

## 2017-04-02 PROCEDURE — 84100 ASSAY OF PHOSPHORUS: CPT | Performed by: PHYSICIAN ASSISTANT

## 2017-04-02 PROCEDURE — 25000132 ZZH RX MED GY IP 250 OP 250 PS 637: Performed by: PHYSICIAN ASSISTANT

## 2017-04-02 PROCEDURE — 83036 HEMOGLOBIN GLYCOSYLATED A1C: CPT | Performed by: PHYSICIAN ASSISTANT

## 2017-04-02 RX ADMIN — IPRATROPIUM BROMIDE AND ALBUTEROL SULFATE 3 ML: .5; 3 SOLUTION RESPIRATORY (INHALATION) at 07:40

## 2017-04-02 RX ADMIN — PROPOFOL 60 MCG/KG/MIN: 10 INJECTION, EMULSION INTRAVENOUS at 05:52

## 2017-04-02 RX ADMIN — INSULIN ASPART 1 UNITS: 100 INJECTION, SOLUTION INTRAVENOUS; SUBCUTANEOUS at 07:49

## 2017-04-02 RX ADMIN — SODIUM CHLORIDE: 9 INJECTION, SOLUTION INTRAVENOUS at 23:58

## 2017-04-02 RX ADMIN — IPRATROPIUM BROMIDE AND ALBUTEROL SULFATE 3 ML: .5; 3 SOLUTION RESPIRATORY (INHALATION) at 20:29

## 2017-04-02 RX ADMIN — PROPOFOL 60 MCG/KG/MIN: 10 INJECTION, EMULSION INTRAVENOUS at 23:11

## 2017-04-02 RX ADMIN — DAPTOMYCIN 750 MG: 500 INJECTION, POWDER, LYOPHILIZED, FOR SOLUTION INTRAVENOUS at 10:17

## 2017-04-02 RX ADMIN — PROPOFOL 60 MCG/KG/MIN: 10 INJECTION, EMULSION INTRAVENOUS at 08:29

## 2017-04-02 RX ADMIN — PIPERACILLIN SODIUM,TAZOBACTAM SODIUM 3.38 G: 3; .375 INJECTION, POWDER, FOR SOLUTION INTRAVENOUS at 11:48

## 2017-04-02 RX ADMIN — PROPOFOL 60 MCG/KG/MIN: 10 INJECTION, EMULSION INTRAVENOUS at 16:28

## 2017-04-02 RX ADMIN — PROPOFOL 60 MCG/KG/MIN: 10 INJECTION, EMULSION INTRAVENOUS at 04:02

## 2017-04-02 RX ADMIN — PANTOPRAZOLE SODIUM 40 MG: 40 INJECTION, POWDER, FOR SOLUTION INTRAVENOUS at 07:30

## 2017-04-02 RX ADMIN — SODIUM CHLORIDE: 9 INJECTION, SOLUTION INTRAVENOUS at 05:50

## 2017-04-02 RX ADMIN — PROPOFOL 60 MCG/KG/MIN: 10 INJECTION, EMULSION INTRAVENOUS at 13:55

## 2017-04-02 RX ADMIN — IPRATROPIUM BROMIDE AND ALBUTEROL SULFATE 3 ML: .5; 3 SOLUTION RESPIRATORY (INHALATION) at 11:14

## 2017-04-02 RX ADMIN — PROPOFOL 60 MCG/KG/MIN: 10 INJECTION, EMULSION INTRAVENOUS at 11:01

## 2017-04-02 RX ADMIN — PROPOFOL 60 MCG/KG/MIN: 10 INJECTION, EMULSION INTRAVENOUS at 20:50

## 2017-04-02 RX ADMIN — HEPARIN SODIUM 5000 UNITS: 10000 INJECTION, SOLUTION INTRAVENOUS; SUBCUTANEOUS at 14:47

## 2017-04-02 RX ADMIN — HEPARIN SODIUM 5000 UNITS: 10000 INJECTION, SOLUTION INTRAVENOUS; SUBCUTANEOUS at 22:05

## 2017-04-02 RX ADMIN — MICONAZOLE NITRATE: 2 POWDER TOPICAL at 09:16

## 2017-04-02 RX ADMIN — PROPOFOL 60 MCG/KG/MIN: 10 INJECTION, EMULSION INTRAVENOUS at 02:11

## 2017-04-02 RX ADMIN — INSULIN ASPART 1 UNITS: 100 INJECTION, SOLUTION INTRAVENOUS; SUBCUTANEOUS at 04:05

## 2017-04-02 RX ADMIN — CHLORHEXIDINE GLUCONATE 15 ML: 1.2 RINSE ORAL at 07:49

## 2017-04-02 RX ADMIN — HEPARIN SODIUM 5000 UNITS: 10000 INJECTION, SOLUTION INTRAVENOUS; SUBCUTANEOUS at 05:44

## 2017-04-02 RX ADMIN — PIPERACILLIN SODIUM,TAZOBACTAM SODIUM 3.38 G: 3; .375 INJECTION, POWDER, FOR SOLUTION INTRAVENOUS at 05:42

## 2017-04-02 RX ADMIN — PIPERACILLIN SODIUM,TAZOBACTAM SODIUM 3.38 G: 3; .375 INJECTION, POWDER, FOR SOLUTION INTRAVENOUS at 20:16

## 2017-04-02 RX ADMIN — PROPOFOL 60 MCG/KG/MIN: 10 INJECTION, EMULSION INTRAVENOUS at 00:03

## 2017-04-02 RX ADMIN — IPRATROPIUM BROMIDE AND ALBUTEROL SULFATE 3 ML: .5; 3 SOLUTION RESPIRATORY (INHALATION) at 15:22

## 2017-04-02 RX ADMIN — CHLORHEXIDINE GLUCONATE 15 ML: 1.2 RINSE ORAL at 20:16

## 2017-04-02 RX ADMIN — MICONAZOLE NITRATE: 2 POWDER TOPICAL at 20:53

## 2017-04-02 NOTE — PROGRESS NOTES
[facility]    Critical Care Service  Progress Note    Date of Service (when I saw the patient): 04/02/2017     Assessment & Plan   Caro Landis is a 36 year old female with PMHx of PCOS and HLD who presents to [facility] ED on 3/29 with a swollen painful R groin found to have necrotizing fasciitis now s/p debridement in the OR on 3/29. The patient was admitted to the ICU due to hypotensive from presumed septic shock. She remains intubated after going to the OR for washouts.       Neuro  1. Acute pain  2. Sedation  Plan:  -- fentanyl bumps for pain management 25-50mcg/1hr   -- Propofol for sedation as needed until extubation      CV  1. Hypotension, likely septic shock in the setting of necrotizing fascitis. Random cortisol levels low but given that pressor needs have not increased, okay to hold off of stress dose steroids   2. Hypertriglyceridemia, likely related to PCOS  Plan:  -- Wean NE for blood pressure support as able, should be able to D/C once propofol is off   -- trend LA and CBC/BMP       Resp:  1. Acute respiratory failure, post-operatively secondary to acute illness. Remains intubated post operatively for airway protection. Required increased PEEP yesterday- CXR unchanged, no significant secretions.  Intermittently high Paw of unclear etiology, on duonebs.  Plan:  -- Decrease PEEP to 12; attempt to further decrease later today  -- Full vent support today; assess for weaning and extubation tomorrow after surgery  -- Continue duonebs scheduled        GI/Nutrition  1. No prior hx  Plan:  -- NPO  -- PPI      Renal  1. Hyponatremia. Typically occurs with severe necrotizing fascitis, resolved after NS boluses and IVFs   2. No prior hx. Baseline appears to be 0.6, creatinine increased to 2.47.  May be secondary to contrast nephropathy, drugs (Vancomycin); UOP adequate so less likely pre-renal state.  May be ATN from hypotension on admission, need to evaluate for AIN; no reason for crystal induced  nephropathy.    3. Hypocalcemia  Plan:  -- Follow creatinine  -- Follow UOP      ID  1. Necrotizing fascitis of R groin now s/p debridement in the OR on 3/29 and 3/30. Gram stain shows moderate gram positive cocci and gram negative rods. CT scan on admission shows extensive gas pattern in R groin suggestive of necrotizing fasciitis   1. Currently afebrile with a down-trending leukocytosis and normal LA  2. Culture with Prevotella  3. Clindamycin d/c'd yesterday  Plan:  -- continue zosyn and Daptomycin for necrotizing fascitis  -- follow up blood culture and wound culture results   -- Plan for wound vac placement on 4/3       Endocrine  1. Stress Hyperglycemia, likely related to necrotizing fascitis, resolving   2. PCOS. Previously on metformin and spironolactone   3. Impaired fasting glucose per chart review. HgbA1c 9.6 on admission   Plan:  -- Insulin gtt per protocol if indicated  -- Keep BG <180 for optimal healing      Heme:  1. Acute blood loss anemia. Hgb 11.6  Plan:  -- Monitor hemoglobin.  -- Transfuse to keep > 7.0      MSK  1. Necrotizing fascitis, R leg and groin  Plan:  -- management plan per above       Skin  1. Redness of the face without apparent angioedema, some swelling of eyelids- resolved   2. Wound on R groin, necrotizing fascitis- erythema has not expanded past previously demarcated area   Plan:  -- wound care- wet to dry  -- will continue to monitor facial rash       General cares:  DVT Prophylaxis: Heparin SQ   GI Prophylaxis: PPI  Restraints: Restraints for medical healing needed: NO  Family update by me today: No  Current lines are required for patient management  Access:  IJ L 3/29      Madison Tomlin MD  Pager: [phone]      Time Spent on this Encounter   Billing:  I spent 30 minutes bedside and on the inpatient unit today managing the critical care of Caro Landis in relation to the issues listed in this note.    Main Plans for Today   - Decrease PEEP as tolerates  - Full vent support at  present; assess weaning after surgery tomorrow  - Follow UOP  - Plan for repeat washout next week    Interval History   Course reviewed. No acute events overnight. The patient went to the OR yesterday for a second debridement. The surgeons felt successful in debridement. The patient remains intubated.  Increased PEEP yesterday due to desaturation to 15, CXR without an acute process.  Creatinine has not further increased, UOP remains good.    Physical Exam   Temp: 100.1  F (37.8  C) Temp src: Axillary Temp  Min: 97.3  F (36.3  C)  Max: 100.1  F (37.8  C) BP: 132/79   Heart Rate: 80 Resp: 16 SpO2: 95 % O2 Device: Mechanical Ventilator    Vitals:    03/29/17 0927 03/31/17 0400 04/01/17 0600   Weight: 114.3 kg (252 lb) 125.8 kg (277 lb 5.4 oz) 125.3 kg (276 lb 3.8 oz)     I/O last 3 completed shifts:  In: 2989.2 [I.V.:2989.2]  Out: 1840 [Urine:1665; Emesis/NG output:175]    GEN:  female, resting in bed, sedated  HEENT: Normocephalic, atraumatic, trachea midline, ETT secure, pupils equal and reactive   CV: RRR, no gallops, rubs, or murmurs  PULM/CHEST: Scattered course upper airway sounds, no rhonchi, crackles or wheeze, symmetric chest rise   GI: normal bowel sounds, soft, non-tender, no rebound tenderness or guarding  : wong catheter in place, urine yellow and clear  EXTREMITIES: no peripheral edema, peripheral pulses intact  NEURO: sedated on exam  SKIN: gauze in wound in R groin, bandages appear clean, erythema has not extended past area of de-lineation from yesterday   Imaging personally reviewed: no new imaging   ECG-- NSR on tele     Medications     propofol (DIPRIVAN) infusion 60 mcg/kg/min (04/02/17 0829)     norepinephrine Stopped (03/31/17 1400)     NaCl 75 mL/hr at 04/02/17 0550     fentaNYL 25 mcg/hr (04/02/17 0800)       DAPTOmycin (CUBICIN) intermittent infusion  6 mg/kg Intravenous Q24H     miconazole   Topical BID     insulin aspart  1-12 Units Subcutaneous Q4H     ipratropium - albuterol 0.5  mg/2.5 mg/3 mL  3 mL Nebulization 4x daily     pantoprazole  40 mg Intravenous QAM AC     chlorhexidine  15 mL Mouth/Throat Q12H     heparin  5,000 Units Subcutaneous Q8H     piperacillin-tazobactam  3.375 g Intravenous Q6H       Data     Recent Labs  Lab 04/02/17  0530 04/01/17  1420 04/01/17  0445 03/31/17  2200 03/31/17  0400 03/30/17  2217  03/29/17  1515 03/29/17  1000   WBC  --   --  10.0  --  12.3* 13.5*  < > 17.6* 17.2*   HGB  --   --  9.8*  --  10.9* 11.0*  < > 10.8* 13.9   MCV  --   --  86  --  85 86  < > 83 82   PLT  --   --  279  --  301 291  < > 244 279   INR  --   --   --   --   --   --   --  1.29*  --    NA  --  146* 147*  --  145* 146*  < > 137 130*   POTASSIUM  --  3.8 3.9 4.0 3.7 3.9  < > 3.5 3.9   CHLORIDE  --  113* 113*  --  112* 113*  < > 105 95   CO2  --  22 22  --  23 23  < > 23 26   BUN  --  10 9  --  5* 5*  < > 5* 6*   CR 2.40* 2.43* 2.47*  --  1.60* 1.42*  < > 0.58 0.66   ANIONGAP  --  11 12  --  10 10  < > 9 9   ROBERT  --  7.1* 7.2*  --  7.5* 7.1*  < > 6.8* 7.9*   GLC  --  157* 143*  --  120* 124*  < > 213* 356*   ALBUMIN  --   --   --   --   --   --   --  1.7* 2.3*   PROTTOTAL  --   --   --   --   --   --   --  5.3* 7.1   BILITOTAL  --   --   --   --   --   --   --  0.7 0.9   ALKPHOS  --   --   --   --   --   --   --  57 83   ALT  --   --   --   --   --   --   --  11 18   AST  --   --   --   --   --   --   --  10 8   < > = values in this interval not displayed.  No results found for this or any previous visit (from the past 24 hour(s)).

## 2017-04-02 NOTE — PLAN OF CARE
Problem: Goal Outcome Summary  Goal: Goal Outcome Summary  Outcome: No Change  VSS.  Sedated on Propofol and Fentanyl.  Tele: SR.  BP WNL. OG to low intermittent suction.  Good UOP.   Continue current plan of care.

## 2017-04-02 NOTE — PROGRESS NOTES
[facility] ICU RESPIRATORY NOTE  Date of Admission:3/29/2017  Date of Intubation (most recent):3/29/2017  Reason for Mechanical Ventilation: Airway Protection  Number of Days on Mechanical Ventilation:5  Met Criteria for Pressure Support Trial: no  Length of Pressure Support Trial:  Reason for Stopping Pressure Support Trial:  Reason for No Pressure Support Trial: per MD     Pt remains on the following vent settings:    Ventilation Mode: CMV/AC  FiO2 (%): 40 %  Rate Set (breaths/minute): 15 breaths/min  Tidal Volume Set (mL): 600 mL  PEEP (cm H2O): 15 cmH2O  Oxygen Concentration (%): 40 %  Resp: 13      Recent Labs  Lab 04/01/17  0445 03/31/17  1747 03/31/17  1500 03/29/17  1515   PH 7.28* 7.28* 7.27* 7.36   PCO2 45 41 47* 41   PO2 151* 73* 73* 79*   HCO3 21 20* 21 23   O2PER  --   --  50% 70%     Plan: Assess for weaning ability. Will continue to follow.     4/2/2017  Cassandra Benítez

## 2017-04-02 NOTE — PLAN OF CARE
Problem: Goal Outcome Summary  Goal: Goal Outcome Summary  Outcome: No Change  Remains vented and sedated w propofol.  Fentanyl gtt for pain.  Lungs clear and diminished. Small ETT secretions.  UO good.  VSS.  Right groin wound (open) dressing changed by surgery.  Starting to have stool.  Mother at bedside.

## 2017-04-02 NOTE — PROGRESS NOTES
[facility]    General Surgery  Daily Post-Op Note    Assessment & Plan   Procedure(s):  S/p excisional debridement of right groin for necrotizing soft tissue infection 3/29  S/p right groin exploration with excisional debridement of necrotic tissue 3/20    Plan:  - continue wet to dry dressing change, plan for wound vac in OR on Mon  - continue to work on weaning  - continue Zosyn, Daptomycin    Nedra Barlow PA-C    Interval History   Intubated and sedated  Tolerated bedside dressing change, withdraws from pain    Physical Exam   Temp: 98.4  F (36.9  C) Temp src: Axillary BP: 115/61   Heart Rate: 75 Resp: 15 SpO2: 96 % O2 Device: Mechanical Ventilator    Vitals:    03/29/17 0927 03/31/17 0400 04/01/17 0600   Weight: 114.3 kg (252 lb) 125.8 kg (277 lb 5.4 oz) 125.3 kg (276 lb 3.8 oz)     Vital Signs with Ranges  Temp:  [98.4  F (36.9  C)-100.1  F (37.8  C)] 98.4  F (36.9  C)  Heart Rate:  [72-96] 75  Resp:  [12-20] 15  BP: (109-136)/(57-80) 115/61  FiO2 (%):  [40 %-50 %] 40 %  SpO2:  [91 %-99 %] 96 %  I/O last 3 completed shifts:  In: 2989.2 [I.V.:2989.2]  Out: 1840 [Urine:1665; Emesis/NG output:175]  Constitutional: intubated and sedated   Groin - wound with beefy red clean tissue, wound edges with no redness.      Medications     propofol (DIPRIVAN) infusion 60 mcg/kg/min (04/02/17 1355)     norepinephrine Stopped (03/31/17 1400)     NaCl 75 mL/hr at 04/02/17 0550     fentaNYL 25 mcg/hr (04/02/17 0800)        DAPTOmycin (CUBICIN) intermittent infusion  6 mg/kg Intravenous Q24H     miconazole   Topical BID     insulin aspart  1-12 Units Subcutaneous Q4H     ipratropium - albuterol 0.5 mg/2.5 mg/3 mL  3 mL Nebulization 4x daily     pantoprazole  40 mg Intravenous QAM AC     chlorhexidine  15 mL Mouth/Throat Q12H     heparin  5,000 Units Subcutaneous Q8H     piperacillin-tazobactam  3.375 g Intravenous Q6H       Data     Recent Labs  Lab 04/02/17  0530 04/01/17  1420 04/01/17  0448  03/31/17  2200 03/31/17  0400 03/30/17  2217  03/29/17  1515 03/29/17  1000   WBC  --   --  10.0  --  12.3* 13.5*  < > 17.6* 17.2*   HGB  --   --  9.8*  --  10.9* 11.0*  < > 10.8* 13.9   MCV  --   --  86  --  85 86  < > 83 82   PLT  --   --  279  --  301 291  < > 244 279   INR  --   --   --   --   --   --   --  1.29*  --    NA  --  146* 147*  --  145* 146*  < > 137 130*   POTASSIUM  --  3.8 3.9 4.0 3.7 3.9  < > 3.5 3.9   CHLORIDE  --  113* 113*  --  112* 113*  < > 105 95   CO2  --  22 22  --  23 23  < > 23 26   BUN  --  10 9  --  5* 5*  < > 5* 6*   CR 2.40* 2.43* 2.47*  --  1.60* 1.42*  < > 0.58 0.66   ANIONGAP  --  11 12  --  10 10  < > 9 9   ROBERT  --  7.1* 7.2*  --  7.5* 7.1*  < > 6.8* 7.9*   GLC  --  157* 143*  --  120* 124*  < > 213* 356*   ALBUMIN  --   --   --   --   --   --   --  1.7* 2.3*   PROTTOTAL  --   --   --   --   --   --   --  5.3* 7.1   BILITOTAL  --   --   --   --   --   --   --  0.7 0.9   ALKPHOS  --   --   --   --   --   --   --  57 83   ALT  --   --   --   --   --   --   --  11 18   AST  --   --   --   --   --   --   --  10 8   < > = values in this interval not displayed.

## 2017-04-03 ENCOUNTER — APPOINTMENT (OUTPATIENT)
Dept: GENERAL RADIOLOGY | Facility: CLINIC | Age: 37
DRG: 853 | End: 2017-04-03
Attending: INTERNAL MEDICINE
Payer: COMMERCIAL

## 2017-04-03 ENCOUNTER — ANESTHESIA EVENT (OUTPATIENT)
Dept: SURGERY | Facility: CLINIC | Age: 37
DRG: 853 | End: 2017-04-03
Payer: COMMERCIAL

## 2017-04-03 ENCOUNTER — APPOINTMENT (OUTPATIENT)
Dept: SURGERY | Facility: PHYSICIAN GROUP | Age: 37
End: 2017-04-03
Payer: COMMERCIAL

## 2017-04-03 ENCOUNTER — APPOINTMENT (OUTPATIENT)
Dept: ULTRASOUND IMAGING | Facility: CLINIC | Age: 37
DRG: 853 | End: 2017-04-03
Attending: PHYSICIAN ASSISTANT
Payer: COMMERCIAL

## 2017-04-03 ENCOUNTER — ANESTHESIA (OUTPATIENT)
Dept: SURGERY | Facility: CLINIC | Age: 37
DRG: 853 | End: 2017-04-03
Payer: COMMERCIAL

## 2017-04-03 LAB
BACTERIA SPEC CULT: ABNORMAL
BASE DEFICIT BLDA-SCNC: 5.8 MMOL/L
BUN SERPL-MCNC: 16 MG/DL (ref 7–30)
CALCIUM SERPL-MCNC: 7.5 MG/DL (ref 8.5–10.1)
CHLORIDE SERPL-SCNC: 116 MMOL/L (ref 94–109)
CO2 SERPL-SCNC: 21 MMOL/L (ref 20–32)
CREAT SERPL-MCNC: 2.03 MG/DL (ref 0.52–1.04)
ERYTHROCYTE [DISTWIDTH] IN BLOOD BY AUTOMATED COUNT: 14.3 % (ref 10–15)
GFR SERPL CREATININE-BSD FRML MDRD: 27 ML/MIN/1.7M2
GLUCOSE BLDC GLUCOMTR-MCNC: 116 MG/DL (ref 70–99)
GLUCOSE BLDC GLUCOMTR-MCNC: 119 MG/DL (ref 70–99)
GLUCOSE BLDC GLUCOMTR-MCNC: 122 MG/DL (ref 70–99)
GLUCOSE BLDC GLUCOMTR-MCNC: 124 MG/DL (ref 70–99)
GLUCOSE BLDC GLUCOMTR-MCNC: 149 MG/DL (ref 70–99)
GLUCOSE BLDC GLUCOMTR-MCNC: 170 MG/DL (ref 70–99)
GLUCOSE SERPL-MCNC: 118 MG/DL (ref 70–99)
HCO3 BLD-SCNC: 20 MMOL/L (ref 21–28)
HCT VFR BLD AUTO: 27.4 % (ref 35–47)
HGB BLD-MCNC: 8.8 G/DL (ref 11.7–15.7)
Lab: ABNORMAL
MAGNESIUM SERPL-MCNC: 2.4 MG/DL (ref 1.6–2.3)
MCH RBC QN AUTO: 28.3 PG (ref 26.5–33)
MCHC RBC AUTO-ENTMCNC: 32.1 G/DL (ref 31.5–36.5)
MCV RBC AUTO: 88 FL (ref 78–100)
MICRO REPORT STATUS: ABNORMAL
O2/TOTAL GAS SETTING VFR VENT: ABNORMAL %
OXYHGB MFR BLD: 96 % (ref 92–100)
PCO2 BLD: 42 MM HG (ref 35–45)
PH BLD: 7.29 PH (ref 7.35–7.45)
PHOSPHATE SERPL-MCNC: 4.6 MG/DL (ref 2.5–4.5)
PLATELET # BLD AUTO: 301 10E9/L (ref 150–450)
PO2 BLD: 95 MM HG (ref 80–105)
POTASSIUM SERPL-SCNC: 3.8 MMOL/L (ref 3.4–5.3)
RBC # BLD AUTO: 3.11 10E12/L (ref 3.8–5.2)
SODIUM SERPL-SCNC: 147 MMOL/L (ref 133–144)
SPECIMEN SOURCE: ABNORMAL
WBC # BLD AUTO: 8.5 10E9/L (ref 4–11)

## 2017-04-03 PROCEDURE — 25000566 ZZH SEVOFLURANE, EA 15 MIN: Performed by: SURGERY

## 2017-04-03 PROCEDURE — 00000146 ZZHCL STATISTIC GLUCOSE BY METER IP

## 2017-04-03 PROCEDURE — 97606 NEG PRS WND THER DME>50 SQCM: CPT | Performed by: SURGERY

## 2017-04-03 PROCEDURE — 25000125 ZZHC RX 250: Performed by: PHYSICIAN ASSISTANT

## 2017-04-03 PROCEDURE — 25000132 ZZH RX MED GY IP 250 OP 250 PS 637: Performed by: PHYSICIAN ASSISTANT

## 2017-04-03 PROCEDURE — 27210794 ZZH OR GENERAL SUPPLY STERILE: Performed by: SURGERY

## 2017-04-03 PROCEDURE — 25800025 ZZH RX 258: Performed by: NURSE ANESTHETIST, CERTIFIED REGISTERED

## 2017-04-03 PROCEDURE — 25000128 H RX IP 250 OP 636: Performed by: INTERNAL MEDICINE

## 2017-04-03 PROCEDURE — 80048 BASIC METABOLIC PNL TOTAL CA: CPT | Performed by: PHYSICIAN ASSISTANT

## 2017-04-03 PROCEDURE — 93970 EXTREMITY STUDY: CPT

## 2017-04-03 PROCEDURE — 85027 COMPLETE CBC AUTOMATED: CPT | Performed by: PHYSICIAN ASSISTANT

## 2017-04-03 PROCEDURE — 94660 CPAP INITIATION&MGMT: CPT

## 2017-04-03 PROCEDURE — 37000008 ZZH ANESTHESIA TECHNICAL FEE, 1ST 30 MIN: Performed by: SURGERY

## 2017-04-03 PROCEDURE — 25000128 H RX IP 250 OP 636: Performed by: NURSE ANESTHETIST, CERTIFIED REGISTERED

## 2017-04-03 PROCEDURE — 36600 WITHDRAWAL OF ARTERIAL BLOOD: CPT

## 2017-04-03 PROCEDURE — 27210429 ZZH NUTRITION PRODUCT INTERMEDIATE LITER

## 2017-04-03 PROCEDURE — 25000128 H RX IP 250 OP 636: Performed by: PHYSICIAN ASSISTANT

## 2017-04-03 PROCEDURE — 99212 OFFICE O/P EST SF 10 MIN: CPT

## 2017-04-03 PROCEDURE — 94640 AIRWAY INHALATION TREATMENT: CPT

## 2017-04-03 PROCEDURE — 25000125 ZZHC RX 250: Performed by: NURSE ANESTHETIST, CERTIFIED REGISTERED

## 2017-04-03 PROCEDURE — 83735 ASSAY OF MAGNESIUM: CPT | Performed by: PHYSICIAN ASSISTANT

## 2017-04-03 PROCEDURE — 94640 AIRWAY INHALATION TREATMENT: CPT | Mod: 76

## 2017-04-03 PROCEDURE — 82805 BLOOD GASES W/O2 SATURATION: CPT | Performed by: INTERNAL MEDICINE

## 2017-04-03 PROCEDURE — 0JQL0ZZ REPAIR RIGHT UPPER LEG SUBCUTANEOUS TISSUE AND FASCIA, OPEN APPROACH: ICD-10-PCS | Performed by: SURGERY

## 2017-04-03 PROCEDURE — 40000275 ZZH STATISTIC RCP TIME EA 10 MIN

## 2017-04-03 PROCEDURE — 25000132 ZZH RX MED GY IP 250 OP 250 PS 637: Performed by: SURGERY

## 2017-04-03 PROCEDURE — 36000052 ZZH SURGERY LEVEL 2 EA 15 ADDTL MIN: Performed by: SURGERY

## 2017-04-03 PROCEDURE — 27210995 ZZH RX 272: Performed by: SURGERY

## 2017-04-03 PROCEDURE — 40000008 ZZH STATISTIC AIRWAY CARE

## 2017-04-03 PROCEDURE — 20000003 ZZH R&B ICU

## 2017-04-03 PROCEDURE — 84100 ASSAY OF PHOSPHORUS: CPT | Performed by: PHYSICIAN ASSISTANT

## 2017-04-03 PROCEDURE — 36000050 ZZH SURGERY LEVEL 2 1ST 30 MIN: Performed by: SURGERY

## 2017-04-03 PROCEDURE — 99291 CRITICAL CARE FIRST HOUR: CPT | Performed by: PHYSICIAN ASSISTANT

## 2017-04-03 PROCEDURE — 37000009 ZZH ANESTHESIA TECHNICAL FEE, EACH ADDTL 15 MIN: Performed by: SURGERY

## 2017-04-03 PROCEDURE — 40000940 XR CHEST PORT 1 VW

## 2017-04-03 RX ORDER — AMINO ACIDS/PROTEIN HYDROLYS 15G-100/30
1 LIQUID (ML) ORAL
Status: DISCONTINUED | OUTPATIENT
Start: 2017-04-03 | End: 2017-04-05

## 2017-04-03 RX ORDER — PROPOFOL 10 MG/ML
INJECTION, EMULSION INTRAVENOUS CONTINUOUS PRN
Status: DISCONTINUED | OUTPATIENT
Start: 2017-04-03 | End: 2017-04-03

## 2017-04-03 RX ORDER — PROPOFOL 10 MG/ML
5-75 INJECTION, EMULSION INTRAVENOUS CONTINUOUS
Status: DISCONTINUED | OUTPATIENT
Start: 2017-04-03 | End: 2017-04-04

## 2017-04-03 RX ORDER — SODIUM CHLORIDE, SODIUM LACTATE, POTASSIUM CHLORIDE, CALCIUM CHLORIDE 600; 310; 30; 20 MG/100ML; MG/100ML; MG/100ML; MG/100ML
INJECTION, SOLUTION INTRAVENOUS CONTINUOUS
Status: CANCELLED | OUTPATIENT
Start: 2017-04-03

## 2017-04-03 RX ORDER — MAGNESIUM HYDROXIDE 1200 MG/15ML
LIQUID ORAL PRN
Status: DISCONTINUED | OUTPATIENT
Start: 2017-04-03 | End: 2017-04-03 | Stop reason: HOSPADM

## 2017-04-03 RX ORDER — FENTANYL CITRATE 50 UG/ML
25-50 INJECTION, SOLUTION INTRAMUSCULAR; INTRAVENOUS
Status: DISCONTINUED | OUTPATIENT
Start: 2017-04-03 | End: 2017-04-09

## 2017-04-03 RX ORDER — FENTANYL CITRATE 50 UG/ML
INJECTION, SOLUTION INTRAMUSCULAR; INTRAVENOUS PRN
Status: DISCONTINUED | OUTPATIENT
Start: 2017-04-03 | End: 2017-04-03

## 2017-04-03 RX ORDER — FENTANYL CITRATE 50 UG/ML
25-50 INJECTION, SOLUTION INTRAMUSCULAR; INTRAVENOUS
Status: DISCONTINUED | OUTPATIENT
Start: 2017-04-03 | End: 2017-04-03

## 2017-04-03 RX ORDER — SODIUM CHLORIDE, SODIUM LACTATE, POTASSIUM CHLORIDE, CALCIUM CHLORIDE 600; 310; 30; 20 MG/100ML; MG/100ML; MG/100ML; MG/100ML
INJECTION, SOLUTION INTRAVENOUS CONTINUOUS PRN
Status: DISCONTINUED | OUTPATIENT
Start: 2017-04-03 | End: 2017-04-03

## 2017-04-03 RX ADMIN — FENTANYL CITRATE 50 MCG: 50 INJECTION, SOLUTION INTRAMUSCULAR; INTRAVENOUS at 11:13

## 2017-04-03 RX ADMIN — PROPOFOL 60 MCG/KG/MIN: 10 INJECTION, EMULSION INTRAVENOUS at 09:06

## 2017-04-03 RX ADMIN — PIPERACILLIN SODIUM,TAZOBACTAM SODIUM 3.38 G: 3; .375 INJECTION, POWDER, FOR SOLUTION INTRAVENOUS at 07:34

## 2017-04-03 RX ADMIN — SODIUM CHLORIDE, POTASSIUM CHLORIDE, SODIUM LACTATE AND CALCIUM CHLORIDE: 600; 310; 30; 20 INJECTION, SOLUTION INTRAVENOUS at 11:13

## 2017-04-03 RX ADMIN — IPRATROPIUM BROMIDE AND ALBUTEROL SULFATE 3 ML: .5; 3 SOLUTION RESPIRATORY (INHALATION) at 19:19

## 2017-04-03 RX ADMIN — DEXMEDETOMIDINE HYDROCHLORIDE 0.4 MCG/KG/HR: 100 INJECTION, SOLUTION INTRAVENOUS at 14:52

## 2017-04-03 RX ADMIN — INSULIN ASPART 1 UNITS: 100 INJECTION, SOLUTION INTRAVENOUS; SUBCUTANEOUS at 16:02

## 2017-04-03 RX ADMIN — CHLORHEXIDINE GLUCONATE 15 ML: 1.2 RINSE ORAL at 07:34

## 2017-04-03 RX ADMIN — ROCURONIUM BROMIDE 25 MG: 10 INJECTION INTRAVENOUS at 11:30

## 2017-04-03 RX ADMIN — PROPOFOL 60 MCG/KG/MIN: 10 INJECTION, EMULSION INTRAVENOUS at 01:36

## 2017-04-03 RX ADMIN — IPRATROPIUM BROMIDE AND ALBUTEROL SULFATE 3 ML: .5; 3 SOLUTION RESPIRATORY (INHALATION) at 07:01

## 2017-04-03 RX ADMIN — MIDAZOLAM HYDROCHLORIDE 2 MG: 1 INJECTION, SOLUTION INTRAMUSCULAR; INTRAVENOUS at 11:13

## 2017-04-03 RX ADMIN — HEPARIN SODIUM 5000 UNITS: 10000 INJECTION, SOLUTION INTRAVENOUS; SUBCUTANEOUS at 21:37

## 2017-04-03 RX ADMIN — PROPOFOL 60 MCG/KG/MIN: 10 INJECTION, EMULSION INTRAVENOUS at 14:11

## 2017-04-03 RX ADMIN — PROPOFOL 60 MCG/KG/MIN: 10 INJECTION, EMULSION INTRAVENOUS at 23:59

## 2017-04-03 RX ADMIN — FENTANYL CITRATE 50 MCG: 50 INJECTION, SOLUTION INTRAMUSCULAR; INTRAVENOUS at 11:26

## 2017-04-03 RX ADMIN — FENTANYL CITRATE 25 MCG/HR: 50 INJECTION, SOLUTION INTRAMUSCULAR; INTRAVENOUS at 01:35

## 2017-04-03 RX ADMIN — MICONAZOLE NITRATE: 2 POWDER TOPICAL at 21:41

## 2017-04-03 RX ADMIN — PROPOFOL 60 MCG/KG/MIN: 10 INJECTION, EMULSION INTRAVENOUS at 19:28

## 2017-04-03 RX ADMIN — IPRATROPIUM BROMIDE AND ALBUTEROL SULFATE 3 ML: .5; 3 SOLUTION RESPIRATORY (INHALATION) at 14:55

## 2017-04-03 RX ADMIN — FENTANYL CITRATE 50 MCG: 50 INJECTION, SOLUTION INTRAMUSCULAR; INTRAVENOUS at 16:21

## 2017-04-03 RX ADMIN — PIPERACILLIN SODIUM,TAZOBACTAM SODIUM 3.38 G: 3; .375 INJECTION, POWDER, FOR SOLUTION INTRAVENOUS at 01:58

## 2017-04-03 RX ADMIN — PANTOPRAZOLE SODIUM 40 MG: 40 INJECTION, POWDER, FOR SOLUTION INTRAVENOUS at 07:32

## 2017-04-03 RX ADMIN — ROCURONIUM BROMIDE 25 MG: 10 INJECTION INTRAVENOUS at 11:13

## 2017-04-03 RX ADMIN — CHLORHEXIDINE GLUCONATE 15 ML: 1.2 RINSE ORAL at 21:36

## 2017-04-03 RX ADMIN — PROPOFOL 60 MCG/KG/MIN: 10 INJECTION, EMULSION INTRAVENOUS at 04:28

## 2017-04-03 RX ADMIN — INSULIN ASPART 2 UNITS: 100 INJECTION, SOLUTION INTRAVENOUS; SUBCUTANEOUS at 21:35

## 2017-04-03 RX ADMIN — DAPTOMYCIN 750 MG: 500 INJECTION, POWDER, LYOPHILIZED, FOR SOLUTION INTRAVENOUS at 10:55

## 2017-04-03 RX ADMIN — PROPOFOL 60 MCG/KG/MIN: 10 INJECTION, EMULSION INTRAVENOUS at 06:45

## 2017-04-03 RX ADMIN — PROPOFOL 80 MCG/KG/MIN: 10 INJECTION, EMULSION INTRAVENOUS at 11:48

## 2017-04-03 RX ADMIN — PIPERACILLIN SODIUM,TAZOBACTAM SODIUM 3.38 G: 3; .375 INJECTION, POWDER, FOR SOLUTION INTRAVENOUS at 21:38

## 2017-04-03 RX ADMIN — PIPERACILLIN SODIUM,TAZOBACTAM SODIUM 3.38 G: 3; .375 INJECTION, POWDER, FOR SOLUTION INTRAVENOUS at 13:13

## 2017-04-03 RX ADMIN — PROPOFOL 60 MCG/KG/MIN: 10 INJECTION, EMULSION INTRAVENOUS at 21:42

## 2017-04-03 RX ADMIN — MICONAZOLE NITRATE: 2 POWDER TOPICAL at 08:40

## 2017-04-03 RX ADMIN — HEPARIN SODIUM 5000 UNITS: 10000 INJECTION, SOLUTION INTRAVENOUS; SUBCUTANEOUS at 06:05

## 2017-04-03 RX ADMIN — PROPOFOL 60 MCG/KG/MIN: 10 INJECTION, EMULSION INTRAVENOUS at 17:51

## 2017-04-03 RX ADMIN — MULTIVITAMIN 15 ML: LIQUID ORAL at 15:49

## 2017-04-03 NOTE — PROGRESS NOTES
[facility] ICU RESPIRATORY NOTE  Date of Admission: 3/29/2017  Date of Intubation (most recent): 3/29/2017  Reason for Mechanical Ventilation: Airway protection  Number of Days on Mechanical Ventilation: 6  Met Criteria for Pressure Support Trial: No  Length of Pressure Support Trial: None  Reason for No Pressure Support Trial:     Significant events: No significant events this shift. Pt remains on the following vent settings:    Ventilation Mode: CMV/AC  FiO2 (%): 40 %  Rate Set (breaths/minute): 15 breaths/min  Tidal Volume Set (mL): 600 mL  PEEP (cm H2O): 12 cmH2O  Oxygen Concentration (%): 40 %  Resp: 15      Recent Labs  Lab 04/01/17  0445 03/31/17  1747 03/31/17  1500 03/29/17  1515   PH 7.28* 7.28* 7.27* 7.36   PCO2 45 41 47* 41   PO2 151* 73* 73* 79*   HCO3 21 20* 21 23   O2PER  --   --  50% 70%     Plan: Pt is going to OR today, will assess for weaning ability afterwards once stable. Cont full ventilatory support at this time.     4/3/2017  Cassandra Benítez RT

## 2017-04-03 NOTE — PLAN OF CARE
Problem: Individualization  Goal: Patient Preferences  Outcome: No Change  Pt is intubated and sedated. Diminished lung sounds. Withdraws lower extremities, no response to stimuli in upper extremities. Pt does grimace at times with mouth care. Small watery stool, unable to get ample for c-diff tox. Estela-area reddened, miconazole powder applied. Plan for I&D today with wound vac placement.

## 2017-04-03 NOTE — PROGRESS NOTES
Pt consulted for wound to R groin wound. WOCN noted pt having VAC applied in OR this AM. WOCN to follow pt chart to assess if consult needed.

## 2017-04-03 NOTE — PROGRESS NOTES
SPIRITUAL HEALTH SERVICES  Spiritual Assessment Progress Note  [facility] ICU   had a lengthy conversation with pt's mother, Hilda.  Mother shared the story of pt becoming ill and resisting medical care due to cost.  Pt and mother obviously had no idea how serious this was. Mother has been researching on the internet this type of infection and is very concerned.       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  Mother has great concern about pt's condition and when she will be able to go back to work.   Pt's work is very demanding and mother questions if/when she will be strong enough to return to her rigorous schedule.  Finances are of a concern if she is not working.     Pt is [employer] and had been anointed x2 by both her own paris  and the hospital .      Mother was tearful as she talked about her love and concern for her daughter's wellbeing and fearful of her future.     actively listened as mother processed the above.  Provided emotional and spiritual support.     SH will continue to follow.                                                                                                                                                      Svitlana Lancaster M.Div., [facility]  Staff    Pager [phone]

## 2017-04-03 NOTE — CONSULTS
"CLINICAL NUTRITION SERVICES  -  ASSESSMENT NOTE      Recommendations Ordered by Registered Dietitian (RD): Promote with Fiber at 15 mL/hr to provide:  360 kcal, 23 g protein, 50 g CHO, 5 g fiber, 299 mL H2O   Add ProStat 1 pkt 5x per day:  500 kcal, 75 g protein  Total (TF + ProStat + Propofol)= 1945 kcal (15 kcal per kg or 111% needs), 98 g protein (1.5 g per kg)  Certavite while on low drip TF  Flushes 60 mL every 4 hours    Malnutrition: Unable to determine due to lack of weight history        REASON FOR ASSESSMENT  Caro Landis is a 36 year old female seen by Registered Dietitian for Provider Order - Registered Dietitian to Assess and Order TF per Medical Nutrition protocol      NUTRITION HISTORY  - Unable to obtain nutrition history as patient intubated and sedated, mother not present.  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    Noted patient with an allergy to shellfish-derived products.    CURRENT NUTRITION ORDERS  Diet Order:     NPO (day #6)    Current Intake/Tolerance:  N/A      PHYSICAL FINDINGS  Observed  No nutrition-related physical findings observed  Obtained from Chart/Interdisciplinary Team  Pressure Ulcers - WOCN eval pending     ANTHROPOMETRICS  Height: 5' 8\"  Weight: 125.8 kg (277#)(3/31)  Body mass index is 42.1 kg/(m^2)  Weight Status:  Obesity Grade III BMI >40  IBW: 63.6 kg   % IBW: 198%  Weight History: Weight history not available     LABS  Hemoglobin A1C 9.6 (H) on admit - noted new dx DM     MEDICATIONS  Propofol at 44.1 mL/hr= 1085 kcal        Estimated Fluid Needs: 1400-Dosing Weight 125.8 kg (energy) and 63.6 kg (protein)    ASSESSED NUTRITION NEEDS:  Estimated Energy Needs: 2650-3825 kcals (11-14 Kcal/Kg)  Justification: obese  Estimated Protein Needs:  grams protein (1.5-2 g pro/Kg)  Justification: post-op, wound healing, hypercatabolism with critical illness and obesity [id number] mL (1 mL/Kcal)  Justification: maintenance    MALNUTRITION:  % Weight " Loss:  Unable to determine   % Intake:  </= 50% for >/= 5 days (severe malnutrition)  Subcutaneous Fat Loss:  None observed  Muscle Loss:  None observed  Fluid Retention:  None noted    Malnutrition Diagnosis: Unable to determine due to lack of weight history     NUTRITION DIAGNOSIS:  Inadequate protein-energy intake related to NPO, TF to start today as evidenced by meeting 0% protein and 77% energy needs via Propofol       NUTRITION INTERVENTIONS  Recommendations / Nutrition Prescription  Promote with Fiber at 15 mL/hr to provide:  360 kcal, 23 g protein, 50 g CHO, 5 g fiber, 299 mL H2O   Add ProStat 1 pkt 5x per day:  500 kcal, 75 g protein  Total (TF + ProStat + Propofol)= 1945 kcal (15 kcal per kg or 111% needs), 98 g protein (1.5 g per kg)  Certavite while on low drip TF  Flushes 60 mL every 4 hours       Implementation  Nutrition education: Not appropriate at this time due to patient condition  EN Composition, EN Schedule, Feeding Tube Flush, Medical Food Supplement, Multivitamin/Mineral:  Ordered Promote with Fiber at 15 mL/hr + ProStat, flushes, and MVI with minerals as above.  Collaboration and Referral of Nutrition care:  Patient discussed today during interdisciplinary bedside rounds.      Nutrition Goals  Promote with Fiber at 15 mL/hr + ProStat + Propofol will meet % needs      MONITORING AND EVALUATION:  Progress towards goals will be monitored and evaluated per protocol and Practice Guidelines    Kristina Song RD, LD, CNSC   Clinical Dietitian - [facility]

## 2017-04-03 NOTE — PROGRESS NOTES
[facility] ICU RESPIRATORY NOTE  Date of Admission: 3/29/2017  Date of Intubation (most recent): 3/29/2017  Reason for Mechanical Ventilation: Airway protection  Number of Days on Mechanical Ventilation: 5  Met Criteria for Pressure Support Trial: No  Length of Pressure Support Trial: None  Reason for No Pressure Support Trial: PEEP 12 CmH2o    Significant Events Today: PEEP titrated to 12 CmH2O  Ventilation Mode: CMV/AC  FiO2 (%): 40 %  Rate Set (breaths/minute): 15 breaths/min  Tidal Volume Set (mL): 600 mL  PEEP (cm H2O): 12 cmH2O  Oxygen Concentration (%): 40 %  Resp: 14    ABG Results:     Recent Labs  Lab 04/01/17  0445 03/31/17  1747 03/31/17  1500 03/29/17  1515   PH 7.28* 7.28* 7.27* 7.36   PCO2 45 41 47* 41   PO2 151* 73* 73* 79*   HCO3 21 20* 21 23   O2PER  --   --  50% 70%     ETT appearance on chest x-ray: ET tube in good position.    Plan:  Wean PEEP as tolerated, plan wean to extubate.  4/2/2017  Boby Randall

## 2017-04-03 NOTE — PLAN OF CARE
Problem: Goal Outcome Summary  Goal: Goal Outcome Summary  Outcome: No Change  Remains vented and sedated.  UO good.  VSS.  Mother here.  To OR for debridement and wound vac insertion.

## 2017-04-03 NOTE — BRIEF OP NOTE
General Surgery Brief Operative Note    Pre-operative diagnosis: RIGHT GROIN WOUND    Post-operative diagnosis Same   Procedure: Procedure(s):  RIGHT GROIN EXPLORATION WITH WOUND IRRIGATION AND WOUND VAC PLACEMENT  - Wound Class: II-Clean Contaminated   - Wound Class: II-Clean Contaminated    Surgeon(s), Assistant(s): Surgeon(s) and Role:     * Mary Beth Neal MD - Primary     * Sandra Delatorre PA-C - Assisting   Estimated blood loss: * No values recorded between 4/3/2017 11:35 AM and 4/3/2017 12:01 PM *   Drains: Wound vac in place   Specimens: * No specimens in log *   Findings: See postop diagnosis   Condition: Stable   Comments:      Sandra Delatorre PA-C See dictated operative report for full details

## 2017-04-03 NOTE — ANESTHESIA POSTPROCEDURE EVALUATION
Patient: Caro Landis    Procedure(s):  RIGHT GROIN EXPLORATION WITH WOUND IRRIGATION AND WOUND VAC PLACMENT  - Wound Class: II-Clean Contaminated   - Wound Class: II-Clean Contaminated    Diagnosis:RIGHT GROIN WOUND   Diagnosis Additional Information: No value filed.    Anesthesia Type:  General, ETT    Note:  Anesthesia Post Evaluation    Patient location during evaluation: ICU  Patient participation: Unable to evaluate secondary to administered sedation  Post-procedure mental status: Intubated and sedated.  Pain management: unable to assess  Airway patency: intubated.  Cardiovascular status: acceptable  Respiratory status: ventilator and intubated  Hydration status: acceptable  PONV: unable to assess     Anesthetic complications: None          Last vitals:  Vitals:    04/03/17 1455 04/03/17 1500 04/03/17 1600   BP:  128/74 137/85   Pulse:      Resp:  21 20   Temp:      SpO2: 97% 99% 98%         Electronically Signed By: Jake Beauchamp MD  April 3, 2017  5:33 PM

## 2017-04-03 NOTE — PROGRESS NOTES
"General Surgery Progress Note    Subjective: pt remains intubated and sedated on vent. She has had increased ventilatory requirements over the weekend. Her renal function is slowly improving. She has been afebrile.     Objective: /69  Pulse 102  Temp 97.4  F (36.3  C) (Axillary)  Resp 15  Ht 5' 8\" (1.727 m)  Wt 278 lb 14.1 oz (126.5 kg)  SpO2 100%  BMI 42.4 kg/m2  Gen: sedated  CV: tachycardic  Pulm: mech vent  Abd: wound vac in place with excellent seal  Ext: WWP    Assessment/Plan:   Caro Landis  is a 36 year old female s/p excisional debridement of right groin necrotizing soft tissue infection. Wound clean today and vac placed.   - wound vac change on Thursday at bedside  - pt needs nutrition - will place consult  - wound RN consult as she has skin breakdown perianally    Mary Beth Neal MD  [facility]  [phone]              "

## 2017-04-03 NOTE — OP NOTE
General Surgery Operative Note    PREOPERATIVE DIAGNOSIS: necrotizing soft tissue infection right groin    POSTOPERATIVE DIAGNOSIS: same    PROCEDURE: wound exploration, wound irrigation, placement of wound vacuum device    SURGEON:  Mary Beth Neal MD    ASSISTANT:  Sandra Delatorre PA-C  The PA s assistance was medically necessary to provide adequate exposure in the operating field, maintain hemostasis, cutting suture, clamping and ligating bleeding vessels, and visualization of anatomic structures throughout the surgical procedure.       ANESTHESIA:  General.    BLOOD LOSS: 0ml    FINDINGS: wound clean, no debridement required. Wound vac placed, two sponges.     INDICATIONS:   Ms. Landis presented with a necrotizing soft tissue infection in her right groin. She was taken to the OR last week for excisional debridement. She returns to the OR today for wound exploration and possible wound vac placement.     DETAILS OF PROCEDURE: The patient was brought to the operating room per Anesthesia, placed in supine position, and transferred to the OR table. A Surgical timeout was then performed, verifying the correct surgeon, site, procedure, and patient and all in the room were in agreement.     The wound base with healthy appearing with beefy red granulation tissue. There was no evidence of ongoing infection. All wound edges appeared viable. The wound was irrigated with sterile saline. Black granufoam sponge was then cut to size and placed in the wound bed. Two sponges were used. The surrounding skin was prepped with sure prep skin barrier and benzoin. The granufoam was held in position with wound vac tape. The elmer pad was placed and connected to suction. There was a leak medially near the labia. This was reinforced with more tape and an excellent seal obtained.     The patient was transferred back the ICU in stable condition.     Mary Beth Neal MD

## 2017-04-03 NOTE — PROGRESS NOTES
[facility] ICU RESPIRATORY NOTE  Date of Admission: 3/29/17  Date of Intubation (most recent): 3/29/17  Reason for Mechanical Ventilation: Acute respiratory failure  Number of Days on Mechanical Ventilation: 6  Met Criteria for Pressure Support Trial: Yes    Reason for No Pressure Support Trial: Per MD    Ventilation Mode: CMV/AC  FiO2 (%): 50 %  Rate Set (breaths/minute): 15 breaths/min  Tidal Volume Set (mL): 550 mL  PEEP (cm H2O): 7 cmH2O  Oxygen Concentration (%): 50 %  Resp: 23    Significant Events Today:  Patient went back to OR today.    ABG Results:      Recent Labs  Lab 04/03/17  1015 04/01/17  0445 03/31/17  1747 03/31/17  1500 03/29/17  1515   PH 7.29* 7.28* 7.28* 7.27* 7.36   PCO2 42 45 41 47* 41   PO2 95 151* 73* 73* 79*   HCO3 20* 21 20* 21 23   O2PER 40%  --   --  50% 70%       ETT appearance on chest x-ray: in good position/ unchanged    Plan:    Continue ventilatory support.    PS trial in am.

## 2017-04-03 NOTE — PROGRESS NOTES
[facility]    Critical Care Service  Progress Note    Date of Service (when I saw the patient): 04/03/2017     Assessment & Plan   Caro Landis is a 36 year old female with PMHx of PCOS and HLD who presents to [facility] ED on 3/29 with a swollen painful R groin found to have necrotizing fasciitis now s/p debridement in the OR on 3/29. The patient was admitted to the ICU due to hypotensive from presumed septic shock. She remains intubated after going to the OR for washouts.       Neuro  1. Acute pain  2. Sedation  Plan:  -- fentanyl bumps for pain management 25-50mcg/2hr   -- Will change propofol to precedex gtt   -- wean sedation post-operatively in attempt to wean       CV  1. Hypotension, likely septic shock in the setting of necrotizing fascitis. Random cortisol levels low but given that pressor needs have not increased, okay to hold off of stress dose steroids   2. Hypertriglyceridemia, likely related to PCOS  Plan:  -- Off of pressors currently    -- Echo to assess for possible right sided heart failure, given vascular congestion after fluids resuscitation  -- check lower extremity doppler for possible DVTs given acute episodes of hypoxia       Resp:  1. Acute respiratory failure, post-operatively secondary to acute illness. Remains intubated post operatively for airway protection. Required increased PEEP yesterday- CXR unchanged, no significant secretions. Intermittently high Paw of unclear etiology, on duonebs.  Plan:  -- Decrease PEEP to 12; attempt to further decrease later today  -- Full vent support today; assess for weaning and extubation tomorrow after surgery  -- Continue duonebs scheduled       GI/Nutrition  1. No prior hx  2. Nutrition: no nutrition this admission  Plan:  -- NPO  -- PPI  -- will need nutrition if extubated this afternoon, if not will need to start tube feeds post-op       Renal  1. DONNA. Baseline appears to be 0.6, creatinine increased to 2.47, now 2.03. May be secondary  to contrast nephropathy, drugs (Vancomycin); UOP adequate so less likely pre-renal state. May be ATN from hypotension on admission, need to evaluate for AIN; no reason for crystal induced nephropathy.   2. Hypernatremia, Na 147  3. Hyponatremia. Typically occurs with severe necrotizing fascitis, resolved after NS boluses and IVFs   4. Hypocalcemia  Plan:  -- Follow creatinine and UOP  -- switched vanc to dapto with mild improvement in Cr  -- follow Na       ID  1. Necrotizing fascitis of R groin now s/p debridement in the OR on 3/29 and 3/30. Gram stain shows moderate gram positive cocci and gram negative rods. CT scan on admission shows extensive gas pattern in R groin suggestive of necrotizing fasciitis   1. Currently afebrile with no leukocytosis   2. Wound cultures with Prevotella and anaerobic diptheroids   3. Clindamycin d/c'd yesterday  Plan:  -- continue zosyn and Daptomycin for necrotizing fascitis  -- follow up blood culture and wound culture results   -- Plan for wound vac placement today      Endocrine  1. Stress Hyperglycemia, likely related to necrotizing fascitis, resolving   2. PCOS. Previously on metformin and spironolactone   3. Impaired fasting glucose per chart review. HgbA1c 9.6 on admission   Plan:  -- IHigh SSI  -- Keep BG <180 for optimal healing      Heme:  1. Acute blood loss anemia. Hgb 8.5  Plan:  -- Monitor hemoglobin.  -- Transfuse to keep > 7.0      MSK  1. Necrotizing fascitis, R leg and groin  Plan:  -- management plan per above       Skin  1. Redness of the face without apparent angioedema, some swelling of eyelids- resolved   2. Wound on R groin, necrotizing fascitis- erythema has not expanded past previously demarcated area   Plan:  -- wound care- wet to dry  -- will continue to monitor facial rash      General cares:  DVT Prophylaxis: Heparin SQ  GI Prophylaxis: PPI  Restraints: Restraints for medical healing needed: YES  Family update by me today: No  Current lines are required  for patient management  Access:  L IJ 3/29    Rosario Ward PA-C  Pager: [phone]    Time Spent on this Encounter   Billing:  I spent 60 minutes bedside and on the inpatient unit today managing the critical care of Caro Landis in relation to the issues listed in this note.    Main Plans for Today   - wound vac placement in the OR  - decrease PEEP and TV  - CBC/BMP  - nutrition plan   - attempt to wean to extubation postoperatively     Interval History   Course reviewed. No acute events overnight. The patient was unable to be weaned to extubation over the weekend due to desaturation upon brining the PEEP down to 5 on 4/1. The patient was able to be weaned from a PEEP of 12 to 10 this AM. She did have wound packing every day this weekend with plans to go to the OR for wound vac placement today. ROS cannot be obtained given the patient's current mental status.     Physical Exam   Temp: 97.4  F (36.3  C) Temp src: Axillary Temp  Min: 97.4  F (36.3  C)  Max: 98.6  F (37  C) BP: 128/74   Heart Rate: 67 Resp: 15 SpO2: 100 % O2 Device: Mechanical Ventilator    Vitals:    03/31/17 0400 04/01/17 0600 04/03/17 0400   Weight: 125.8 kg (277 lb 5.4 oz) 125.3 kg (276 lb 3.8 oz) 126.5 kg (278 lb 14.1 oz)     I/O last 3 completed shifts:  In: 2973.4 [I.V.:2973.4]  Out: 1780 [Urine:1480; Emesis/NG output:300]    GEN:  female, sedated on exam  EYES: PERRL, Anicteric sclera.   HEENT:  Normocephalic, atraumatic, trachea midline, ETT secure  CV: RRR, no gallops, rubs, or murmurs  PULM/CHEST: Scattered rhonchi, no crackles or wheeze  GI: normal bowel sounds, soft, non-tender, no rebound tenderness or guarding  : wong catheter in place, urine yellow and clear  EXTREMITIES: no peripheral edema, moving all extremities, peripheral pulses intact  NEURO: sedated on exam. Pupils equal and reactive   SKIN: warm and dry   Imaging personally reviewed: no new imaging today   ECG- NSR    Medications     propofol (DIPRIVAN) infusion 60  mcg/kg/min (04/03/17 0906)     norepinephrine Stopped (03/31/17 1400)     NaCl 75 mL/hr at 04/02/17 2358     fentaNYL 25 mcg/hr (04/03/17 0800)       DAPTOmycin (CUBICIN) intermittent infusion  6 mg/kg Intravenous Q24H     miconazole   Topical BID     insulin aspart  1-12 Units Subcutaneous Q4H     ipratropium - albuterol 0.5 mg/2.5 mg/3 mL  3 mL Nebulization 4x daily     pantoprazole  40 mg Intravenous QAM AC     chlorhexidine  15 mL Mouth/Throat Q12H     heparin  5,000 Units Subcutaneous Q8H     piperacillin-tazobactam  3.375 g Intravenous Q6H       Data     Recent Labs  Lab 04/03/17  0415 04/02/17  0530 04/01/17  1420 04/01/17  0445  03/31/17  0400  03/29/17  1515 03/29/17  1000   WBC 8.5  --   --  10.0  --  12.3*  < > 17.6* 17.2*   HGB 8.8*  --   --  9.8*  --  10.9*  < > 10.8* 13.9   MCV 88  --   --  86  --  85  < > 83 82     --   --  279  --  301  < > 244 279   INR  --   --   --   --   --   --   --  1.29*  --    *  --  146* 147*  --  145*  < > 137 130*   POTASSIUM 3.8  --  3.8 3.9  < > 3.7  < > 3.5 3.9   CHLORIDE 116*  --  113* 113*  --  112*  < > 105 95   CO2 21  --  22 22  --  23  < > 23 26   BUN 16  --  10 9  --  5*  < > 5* 6*   CR 2.03* 2.40* 2.43* 2.47*  --  1.60*  < > 0.58 0.66   ANIONGAP  --   --  11 12  --  10  < > 9 9   ROBERT 7.5*  --  7.1* 7.2*  --  7.5*  < > 6.8* 7.9*   *  --  157* 143*  --  120*  < > 213* 356*   ALBUMIN  --   --   --   --   --   --   --  1.7* 2.3*   PROTTOTAL  --   --   --   --   --   --   --  5.3* 7.1   BILITOTAL  --   --   --   --   --   --   --  0.7 0.9   ALKPHOS  --   --   --   --   --   --   --  57 83   ALT  --   --   --   --   --   --   --  11 18   AST  --   --   --   --   --   --   --  10 8   < > = values in this interval not displayed.  Recent Results (from the past 24 hour(s))   XR Chest Port 1 View    Narrative    XR CHEST PORT 1 VW 4/3/2017 10:00 AM    HISTORY: Intubation.    COMPARISON: April 1, 2017.      Impression    IMPRESSION: Endotracheal  nasogastric tubes in position as before. Lung  volumes are low but otherwise grossly clear. No pleural effusions or  pneumothorax.    ANDRESSA CARRASCO MD

## 2017-04-03 NOTE — ANESTHESIA CARE TRANSFER NOTE
Patient: Caro Landis    Procedure(s):  RIGHT GROIN EXPLORATION WITH WOUND IRRIGATION AND WOUND VAC PLACMENT  - Wound Class: II-Clean Contaminated   - Wound Class: II-Clean Contaminated    Diagnosis: RIGHT GROIN WOUND   Diagnosis Additional Information: No value filed.    Anesthesia Type:   General, ETT     Note:  Airway :Ventilator  Patient transferred to:ICU  Comments: Report to ICU RN      Vitals: (Last set prior to Anesthesia Care Transfer)    CRNA VITALS  4/3/2017 1130 - 4/3/2017 1216      4/3/2017             Resp Rate (observed): 14    Resp Rate (set): 14                Electronically Signed By: MARTHA Maguire CRNA  April 3, 2017  12:16 PM

## 2017-04-03 NOTE — PLAN OF CARE
Problem: Goal Outcome Summary  Goal: Goal Outcome Summary  Outcome: No Change  Pt VSS, afebrile and is on propofol for sedation.  Pt went to surgery this morning and had an I & D and a wound vac placed.  Pt had a WOC consult placed from surgery PA.  Pt has a wong in place with good UO.  Pt tele was SR.    Pt had her sedation switched from propofol to precedex.  Dr. Maxwell also made some vent changes.

## 2017-04-03 NOTE — PROGRESS NOTES
[facility] Nurse Inpatient Skin Assessment     Initial Assessment of:   Perianal skin        Data:   Patient History:      per MD note(s): 36 year old female with PMHx of PCOS and HLD who presents to [facility] ED on 3/29 with a swollen painful R groin found to have necrotizing fasciitis now s/p debridement in the OR on 3/29. The patient was admitted to the ICU due to hypotensive from presumed septic shock. She remains intubated after going to the OR for washouts.      Moisture Management:  Incontinence Protocol    Catheter secured? Yes    Current Diet / Nutrition:           Active Diet Order      NPO Ice Chips, Meds            Jair Assessment and sub scores:   Jair Score  Av.1  Min: 10  Max: 23       Labs:   Recent Labs   Lab Test  17   0415  17   0530   17   1515   ALBUMIN   --    --    --   1.7*   HGB  8.8*   --    < >  10.8*   RBC  3.11*   --    < >  3.78*   WBC  8.5   --    < >  17.6*   PLT  301   --    < >  244   INR   --    --    --   1.29*   A1C   --   10.4*   --    --     < > = values in this interval not displayed.          Skin Assessment (location):   Perianal Skin  History:  Pt admitted to  ICU due to hypotensive from presumed septic shock. She is intubated sedated after being in OR for washouts of R groin wound. Surgery sent consult for perianal skin breakdown     Skin: intact,  Mild perianal blanchable redness, denuded    Color: red    Temperature  normal     Drainage:  none    Amount: none .     Color: none     Odor: none    Pain:  absent          Intervention:     Patient's chart evaluated.      Assessed: perianal skin cleansed and barrier cream applied    Orders  Reviewed    Supplies  Reviewed    Discussed plan of care with Nurse          Assessment:      Pt is intubated and sedated, unable to respond during assessment. Pt has wound vac that is clean, dry, intact with good suction to R groin that will be assessed Thursday. Skin assessment to gluts revealed  "mild perianal erythema and denuded skin with no open areas or obvious s/sx of infection.  No PI present.         Plan:   Nursing to notify the Provider(s) and re-consult the WO Nurse if skin deteriorate(s).    Skin care plan to perianal skin: cleanse with NS, apply barrier cream to denuded areas   PIP ACTIVITY MEASURES:  CHAIR: Pt should sit on a chair cushion ([id number]) when up to the chair and not sit for more than one hour at a time before fully offloading backside (either stand for a couple of minutes and/or return to bed, positioning on a side) to relieve pressure and re-perfuse tissue.  Additionally, encourage pt to shift side to side every 15 minutes, too.    NOTE: the Z-Flow Pad is NOT a cushion, pt should NOT sit on the Z-Flow pads    BED:  Reposition side to side every 1-2 hours when awake.   Consider Z-Juan M Pillows to help keep pt on side (#[id number] or #[id number]- large). *Z-Flows are for positioning, DO NOT SIT ON!  Keep heels elevated  As able keep HOB below 30 degrees  Evaluate for specialty mattress  NOTE: If pt is refusing to turn or reposition they must be educated on the potential injury from not offloading pressure.  Then this \"educated refusal\" needs to be documented as an \"educated refusal to turn/ reposition\" and document if alert, etc.  Additionally please notify MD and charge nurse of refusal(s).    [facility] Nurse will return:Thursday   Face to face time: 30mins       "

## 2017-04-03 NOTE — PROGRESS NOTES
VSS, pt went to or today for a I&D and wound vac. Was switch from prop to precedex drip, but this did not work as patient was trying to get out of bed. Prop was restarted and patient became more calmer. Plan is to extubated tomorrow.

## 2017-04-03 NOTE — ANESTHESIA PREPROCEDURE EVALUATION
Procedure: Procedure(s):  IRRIGATION AND DEBRIDEMENT GROIN  APPLY WOUND VAC  Preop diagnosis: RIGHT GROIN WOUND     Allergies   Allergen Reactions     Cats      Shellfish-Derived Products      Past Medical History:   Diagnosis Date     Polycystic disease, ovaries      Past Surgical History:   Procedure Laterality Date     APPENDECTOMY       EXCISE LESION GROIN Right 3/29/2017    Procedure: EXCISE LESION GROIN;  Surgeon: Mary Beth Neal MD;  Location: [facility] OR     EXPLORE GROIN Right 3/30/2017    Procedure: EXPLORE GROIN;  Surgeon: Mary Beth Neal MD;  Location: [facility] OR     IRRIGATION AND DEBRIDEMENT GROIN N/A 3/30/2017    Procedure: IRRIGATION AND DEBRIDEMENT GROIN;  Surgeon: Mary Beth Neal MD;  Location: [facility] OR     Prior to Admission medications    Medication Sig Start Date End Date Taking? Authorizing Provider   Acetaminophen (TYLENOL PO) Take 325 mg by mouth every 6 hours as needed for mild pain or fever   Yes Unknown, Entered By History   Naproxen Sodium (ALEVE PO) Take 220 mg by mouth 2 times daily as needed for moderate pain   Yes Unknown, Entered By History     Current Facility-Administered Medications Ordered in Epic   Medication Dose Route Frequency Last Rate Last Dose     DAPTOmycin (CUBICIN) 750 mg in NaCl 0.9 % 100 mL intermittent infusion  6 mg/kg Intravenous Q24H   750 mg at 04/03/17 1055     miconazole (MICATIN; MICRO GUARD) 2 % powder   Topical BID         glucose 40 % gel 15-30 g  15-30 g Oral Q15 Min PRN        Or     dextrose 50 % injection 25-50 mL  25-50 mL Intravenous Q15 Min PRN        Or     glucagon injection 1 mg  1 mg Subcutaneous Q15 Min PRN         insulin aspart (NovoLOG) inj (RAPID ACTING)  1-12 Units Subcutaneous Q4H   1 Units at 04/02/17 0749     ipratropium - albuterol 0.5 mg/2.5 mg/3 mL (DUONEB) neb solution 3 mL  3 mL Nebulization 4x daily   3 mL at 04/03/17 0701     lidocaine (XYLOCAINE) 4 % solution 30-50 mL  30-50 mL Topical Daily PRN         pantoprazole (PROTONIX) 40 mg IV  push injection  40 mg Intravenous QAM AC   40 mg at 04/03/17 0732     naloxone (NARCAN) injection 0.1-0.4 mg  0.1-0.4 mg Intravenous Q2 Min PRN         oxyCODONE-acetaminophen (PERCOCET) 5-325 MG per tablet 1-2 tablet  1-2 tablet Oral Q4H PRN         ondansetron (ZOFRAN-ODT) ODT tab 4 mg  4 mg Oral Q6H PRN        Or     ondansetron (ZOFRAN) injection 4 mg  4 mg Intravenous Q6H PRN         prochlorperazine (COMPAZINE) injection 5-10 mg  5-10 mg Intravenous Q6H PRN        Or     prochlorperazine (COMPAZINE) tablet 5-10 mg  5-10 mg Oral Q6H PRN        Or     prochlorperazine (COMPAZINE) Suppository 25 mg  25 mg Rectal Q12H PRN         metoclopramide (REGLAN) tablet 10 mg  10 mg Oral Q6H PRN        Or     metoclopramide (REGLAN) injection 10 mg  10 mg Intravenous Q6H PRN         senna-docusate (SENOKOT-S;PERICOLACE) 8.6-50 MG per tablet 1-2 tablet  1-2 tablet Oral BID PRN         chlorhexidine (PERIDEX) 0.12 % solution 15 mL  15 mL Mouth/Throat Q12H   15 mL at 04/03/17 0734     propofol (DIPRIVAN) infusion  5-75 mcg/kg/min Intravenous Continuous 41.1 mL/hr at 04/03/17 0906 60 mcg/kg/min at 04/03/17 0906    And     propofol (DIPRIVAN) infusion 10-20 mg  10-20 mg Intravenous Q30 Min PRN         heparin sodium PF injection 5,000 Units  5,000 Units Subcutaneous Q8H   5,000 Units at 04/03/17 0605     norepinephrine (LEVOPHED) 16 mg in D5W 250 mL infusion  0.03-0.4 mcg/kg/min Intravenous Continuous   Stopped at 03/31/17 1400     magnesium hydroxide (MILK OF MAGNESIA) suspension 30 mL  30 mL Oral Daily PRN         piperacillin-tazobactam (ZOSYN) 3.375 g vial to attach to  mL bag  3.375 g Intravenous Q6H   3.375 g at 04/03/17 0734     0.9% sodium chloride infusion   Intravenous Continuous 75 mL/hr at 04/02/17 3905       fentaNYL (SUBLIMAZE) infusion  25-50 mcg/hr Intravenous Continuous 0.5 mL/hr at 04/03/17 0800 25 mcg/hr at 04/03/17 0800     HOLD MEDICATION   Does not apply HOLD         potassium chloride SA  (K-DUR/KLOR-CON M) CR tablet 20-40 mEq  20-40 mEq Oral Q2H PRN         potassium chloride (KLOR-CON) Packet 20-40 mEq  20-40 mEq Oral or Feeding Tube Q2H PRN         potassium chloride 10 mEq in 100 mL intermittent infusion  10 mEq Intravenous Q1H PRN         potassium chloride 10 mEq in 100 mL intermittent infusion with 10 mg lidocaine  10 mEq Intravenous Q1H PRN         potassium chloride 20 mEq in 50 mL intermittent infusion  20 mEq Intravenous Q1H PRN 50 mL/hr at 03/29/17 2131 20 mEq at 03/29/17 2131     magnesium sulfate 4 g in 100 mL sterile water (premade)  4 g Intravenous Q4H PRN         sodium phosphate 15 mmol in D5W intermittent infusion  15 mmol Intravenous Daily PRN         sodium phosphate 20 mmol in D5W intermittent infusion  20 mmol Intravenous Q6H PRN         sodium phosphate 25 mmol in D5W intermittent infusion  25 mmol Intravenous Q8H PRN         fentaNYL Citrate (PF) (SUBLIMAZE) injection  mcg   mcg Intravenous Q2H PRN   50 mcg at 03/31/17 1500     No current [facility]-ordered outpatient prescriptions on file.     Wt Readings from Last 1 Encounters:   04/03/17 126.5 kg (278 lb 14.1 oz)     Temp Readings from Last 1 Encounters:   04/03/17 36.3  C (97.4  F) (Axillary)     BP Readings from Last 6 Encounters:   04/03/17 128/74     Pulse Readings from Last 4 Encounters:   03/29/17 102     Resp Readings from Last 1 Encounters:   04/03/17 15     SpO2 Readings from Last 1 Encounters:   04/03/17 100%     Recent Labs   Lab Test  04/03/17   0415  04/02/17   0530  04/01/17   1420  04/01/17   0445   NA  147*   --   146*  147*   POTASSIUM  3.8   --   3.8  3.9   CHLORIDE  116*   --   113*  113*   CO2  21   --   22  22   ANIONGAP   --    --   11  12   GLC  118*   --   157*  143*   BUN  16   --   10  9   CR  2.03*  2.40*  2.43*  2.47*   ROBERT  7.5*   --   7.1*  7.2*     Recent Labs   Lab Test  04/03/17   0415  04/01/17   0445   WBC  8.5  10.0   HGB  8.8*  9.8*   PLT  301  279     Recent Labs   Lab Test   03/29/17   1515   INR  1.29*      RECENT LABS:   ECG:   ECHO:   CXR:    Anesthesia Evaluation     .             ROS/MED HX    ENT/Pulmonary:     (+)ANTOINETTE risk factors obese, , . .   (-) sleep apnea   Neurologic:       Cardiovascular:         METS/Exercise Tolerance:  >4 METS   Hematologic:         Musculoskeletal: Comment: Necrotizing Fasciatis        GI/Hepatic:        (-) GERD   Renal/Genitourinary: Comment: PCOS        Endo:         Psychiatric:         Infectious Disease:         Malignancy:         Other:                     Physical Exam      Airway     Dental     Cardiovascular       Pulmonary     Other findings: Currently intubated and sedated                Anesthesia Plan      History & Physical Review  History and physical reviewed and following examination; no interval change.    ASA Status:  3 .    NPO Status:  > 8 hours    Plan for General and ETT with Intravenous and Propofol induction. Maintenance will be Inhalation.           Postoperative Care  Postoperative pain management:  IV analgesics.      Consents  Anesthetic plan, risks, benefits and alternatives discussed with:  Parent (Mother and/or Father)..                          .

## 2017-04-04 ENCOUNTER — APPOINTMENT (OUTPATIENT)
Dept: GENERAL RADIOLOGY | Facility: CLINIC | Age: 37
DRG: 853 | End: 2017-04-04
Attending: INTERNAL MEDICINE
Payer: COMMERCIAL

## 2017-04-04 ENCOUNTER — APPOINTMENT (OUTPATIENT)
Dept: GENERAL RADIOLOGY | Facility: CLINIC | Age: 37
DRG: 853 | End: 2017-04-04
Attending: PHYSICIAN ASSISTANT
Payer: COMMERCIAL

## 2017-04-04 ENCOUNTER — APPOINTMENT (OUTPATIENT)
Dept: CARDIOLOGY | Facility: CLINIC | Age: 37
DRG: 853 | End: 2017-04-04
Attending: PHYSICIAN ASSISTANT
Payer: COMMERCIAL

## 2017-04-04 LAB
ALBUMIN SERPL-MCNC: 1.8 G/DL (ref 3.4–5)
ANION GAP SERPL CALCULATED.3IONS-SCNC: 12 MMOL/L (ref 3–14)
BACTERIA SPEC CULT: NO GROWTH
BACTERIA SPEC CULT: NO GROWTH
BACTERIA SPEC CULT: NORMAL
BASE DEFICIT BLDA-SCNC: 5.2 MMOL/L
BUN SERPL-MCNC: 19 MG/DL (ref 7–30)
C DIFF TOX B STL QL: NORMAL
CALCIUM SERPL-MCNC: 7.9 MG/DL (ref 8.5–10.1)
CHLORIDE SERPL-SCNC: 116 MMOL/L (ref 94–109)
CO2 SERPL-SCNC: 21 MMOL/L (ref 20–32)
CREAT SERPL-MCNC: 1.92 MG/DL (ref 0.52–1.04)
ERYTHROCYTE [DISTWIDTH] IN BLOOD BY AUTOMATED COUNT: 14 % (ref 10–15)
GFR SERPL CREATININE-BSD FRML MDRD: 29 ML/MIN/1.7M2
GLUCOSE BLDC GLUCOMTR-MCNC: 124 MG/DL (ref 70–99)
GLUCOSE BLDC GLUCOMTR-MCNC: 137 MG/DL (ref 70–99)
GLUCOSE BLDC GLUCOMTR-MCNC: 139 MG/DL (ref 70–99)
GLUCOSE BLDC GLUCOMTR-MCNC: 165 MG/DL (ref 70–99)
GLUCOSE BLDC GLUCOMTR-MCNC: 185 MG/DL (ref 70–99)
GLUCOSE BLDC GLUCOMTR-MCNC: 202 MG/DL (ref 70–99)
GLUCOSE BLDC GLUCOMTR-MCNC: 226 MG/DL (ref 70–99)
GLUCOSE SERPL-MCNC: 130 MG/DL (ref 70–99)
GRAM STN SPEC: ABNORMAL
HCO3 BLD-SCNC: 20 MMOL/L (ref 21–28)
HCT VFR BLD AUTO: 28.4 % (ref 35–47)
HGB BLD-MCNC: 9.3 G/DL (ref 11.7–15.7)
Lab: NORMAL
Lab: NORMAL
MAGNESIUM SERPL-MCNC: 2.6 MG/DL (ref 1.6–2.3)
MCH RBC QN AUTO: 28.4 PG (ref 26.5–33)
MCHC RBC AUTO-ENTMCNC: 32.7 G/DL (ref 31.5–36.5)
MCV RBC AUTO: 87 FL (ref 78–100)
MICRO REPORT STATUS: ABNORMAL
MICRO REPORT STATUS: NORMAL
O2/TOTAL GAS SETTING VFR VENT: 50 %
OXYHGB MFR BLD: 95 % (ref 92–100)
PCO2 BLD: 34 MM HG (ref 35–45)
PH BLD: 7.37 PH (ref 7.35–7.45)
PHOSPHATE SERPL-MCNC: 3.2 MG/DL (ref 2.5–4.5)
PLATELET # BLD AUTO: 339 10E9/L (ref 150–450)
PO2 BLD: 83 MM HG (ref 80–105)
POTASSIUM SERPL-SCNC: 3.7 MMOL/L (ref 3.4–5.3)
PROCALCITONIN SERPL-MCNC: 0.26 NG/ML
RBC # BLD AUTO: 3.27 10E12/L (ref 3.8–5.2)
SODIUM SERPL-SCNC: 149 MMOL/L (ref 133–144)
SPECIMEN SOURCE: ABNORMAL
SPECIMEN SOURCE: NORMAL
WBC # BLD AUTO: 9.3 10E9/L (ref 4–11)

## 2017-04-04 PROCEDURE — 99291 CRITICAL CARE FIRST HOUR: CPT | Performed by: PHYSICIAN ASSISTANT

## 2017-04-04 PROCEDURE — 83735 ASSAY OF MAGNESIUM: CPT | Performed by: PHYSICIAN ASSISTANT

## 2017-04-04 PROCEDURE — 25000128 H RX IP 250 OP 636: Performed by: PHYSICIAN ASSISTANT

## 2017-04-04 PROCEDURE — 25000132 ZZH RX MED GY IP 250 OP 250 PS 637: Performed by: SURGERY

## 2017-04-04 PROCEDURE — 25000128 H RX IP 250 OP 636: Performed by: INTERNAL MEDICINE

## 2017-04-04 PROCEDURE — 85027 COMPLETE CBC AUTOMATED: CPT | Performed by: PHYSICIAN ASSISTANT

## 2017-04-04 PROCEDURE — E2402 NEG PRESS WOUND THERAPY PUMP: HCPCS

## 2017-04-04 PROCEDURE — 87070 CULTURE OTHR SPECIMN AEROBIC: CPT | Performed by: INTERNAL MEDICINE

## 2017-04-04 PROCEDURE — 25500064 ZZH RX 255 OP 636: Performed by: SURGERY

## 2017-04-04 PROCEDURE — 00000146 ZZHCL STATISTIC GLUCOSE BY METER IP

## 2017-04-04 PROCEDURE — 87040 BLOOD CULTURE FOR BACTERIA: CPT | Performed by: INTERNAL MEDICINE

## 2017-04-04 PROCEDURE — 71010 XR CHEST PORT 1 VW: CPT

## 2017-04-04 PROCEDURE — 20000003 ZZH R&B ICU

## 2017-04-04 PROCEDURE — P9047 ALBUMIN (HUMAN), 25%, 50ML: HCPCS | Performed by: PHYSICIAN ASSISTANT

## 2017-04-04 PROCEDURE — 94640 AIRWAY INHALATION TREATMENT: CPT

## 2017-04-04 PROCEDURE — 87493 C DIFF AMPLIFIED PROBE: CPT | Performed by: ANESTHESIOLOGY

## 2017-04-04 PROCEDURE — 25000125 ZZHC RX 250: Performed by: INTERNAL MEDICINE

## 2017-04-04 PROCEDURE — 40000940 XR ABDOMEN PORT F1 VW

## 2017-04-04 PROCEDURE — 36600 WITHDRAWAL OF ARTERIAL BLOOD: CPT

## 2017-04-04 PROCEDURE — 87106 FUNGI IDENTIFICATION YEAST: CPT | Performed by: INTERNAL MEDICINE

## 2017-04-04 PROCEDURE — 80069 RENAL FUNCTION PANEL: CPT | Performed by: PHYSICIAN ASSISTANT

## 2017-04-04 PROCEDURE — 87086 URINE CULTURE/COLONY COUNT: CPT | Performed by: INTERNAL MEDICINE

## 2017-04-04 PROCEDURE — 40000275 ZZH STATISTIC RCP TIME EA 10 MIN

## 2017-04-04 PROCEDURE — 94640 AIRWAY INHALATION TREATMENT: CPT | Mod: 76

## 2017-04-04 PROCEDURE — 93306 TTE W/DOPPLER COMPLETE: CPT | Mod: 26 | Performed by: INTERNAL MEDICINE

## 2017-04-04 PROCEDURE — 25000132 ZZH RX MED GY IP 250 OP 250 PS 637: Performed by: INTERNAL MEDICINE

## 2017-04-04 PROCEDURE — 84145 PROCALCITONIN (PCT): CPT | Performed by: INTERNAL MEDICINE

## 2017-04-04 PROCEDURE — 25000125 ZZHC RX 250: Performed by: PHYSICIAN ASSISTANT

## 2017-04-04 PROCEDURE — 94003 VENT MGMT INPAT SUBQ DAY: CPT

## 2017-04-04 PROCEDURE — 87205 SMEAR GRAM STAIN: CPT | Performed by: INTERNAL MEDICINE

## 2017-04-04 PROCEDURE — 40000008 ZZH STATISTIC AIRWAY CARE

## 2017-04-04 PROCEDURE — 82805 BLOOD GASES W/O2 SATURATION: CPT | Performed by: PHYSICIAN ASSISTANT

## 2017-04-04 PROCEDURE — 99213 OFFICE O/P EST LOW 20 MIN: CPT

## 2017-04-04 PROCEDURE — 25000132 ZZH RX MED GY IP 250 OP 250 PS 637: Performed by: PHYSICIAN ASSISTANT

## 2017-04-04 PROCEDURE — 40000264 ECHO COMPLETE WITH LUMASON

## 2017-04-04 RX ORDER — LIDOCAINE HYDROCHLORIDE 10 MG/ML
2 INJECTION, SOLUTION INFILTRATION; PERINEURAL ONCE
Status: COMPLETED | OUTPATIENT
Start: 2017-04-04 | End: 2017-04-04

## 2017-04-04 RX ORDER — ALBUMIN (HUMAN) 12.5 G/50ML
12.5 SOLUTION INTRAVENOUS ONCE
Status: COMPLETED | OUTPATIENT
Start: 2017-04-04 | End: 2017-04-04

## 2017-04-04 RX ORDER — QUETIAPINE FUMARATE 25 MG/1
25 TABLET, FILM COATED ORAL AT BEDTIME
Status: DISCONTINUED | OUTPATIENT
Start: 2017-04-04 | End: 2017-04-05

## 2017-04-04 RX ORDER — PROPOFOL 10 MG/ML
5-75 INJECTION, EMULSION INTRAVENOUS CONTINUOUS
Status: DISCONTINUED | OUTPATIENT
Start: 2017-04-04 | End: 2017-04-04

## 2017-04-04 RX ORDER — LORAZEPAM 2 MG/ML
.5-1 INJECTION INTRAMUSCULAR EVERY 4 HOURS PRN
Status: DISCONTINUED | OUTPATIENT
Start: 2017-04-04 | End: 2017-04-04

## 2017-04-04 RX ORDER — FUROSEMIDE 10 MG/ML
40 INJECTION INTRAMUSCULAR; INTRAVENOUS ONCE
Status: COMPLETED | OUTPATIENT
Start: 2017-04-04 | End: 2017-04-04

## 2017-04-04 RX ORDER — PROPOFOL 10 MG/ML
5-75 INJECTION, EMULSION INTRAVENOUS CONTINUOUS
Status: DISCONTINUED | OUTPATIENT
Start: 2017-04-04 | End: 2017-04-05

## 2017-04-04 RX ADMIN — PIPERACILLIN SODIUM,TAZOBACTAM SODIUM 3.38 G: 3; .375 INJECTION, POWDER, FOR SOLUTION INTRAVENOUS at 02:15

## 2017-04-04 RX ADMIN — CHLORHEXIDINE GLUCONATE 15 ML: 1.2 RINSE ORAL at 07:31

## 2017-04-04 RX ADMIN — FENTANYL CITRATE 50 MCG: 50 INJECTION INTRAMUSCULAR; INTRAVENOUS at 20:22

## 2017-04-04 RX ADMIN — PROPOFOL 55 MCG/KG/MIN: 10 INJECTION, EMULSION INTRAVENOUS at 21:19

## 2017-04-04 RX ADMIN — MULTIVITAMIN 15 ML: LIQUID ORAL at 08:37

## 2017-04-04 RX ADMIN — Medication 1 PACKET: at 00:21

## 2017-04-04 RX ADMIN — PROPOFOL 55 MCG/KG/MIN: 10 INJECTION, EMULSION INTRAVENOUS at 08:43

## 2017-04-04 RX ADMIN — Medication 1 PACKET: at 17:57

## 2017-04-04 RX ADMIN — DEXMEDETOMIDINE HYDROCHLORIDE 0.5 MCG/KG/HR: 100 INJECTION, SOLUTION INTRAVENOUS at 16:35

## 2017-04-04 RX ADMIN — HEPARIN SODIUM 5000 UNITS: 10000 INJECTION, SOLUTION INTRAVENOUS; SUBCUTANEOUS at 21:13

## 2017-04-04 RX ADMIN — HEPARIN SODIUM 5000 UNITS: 10000 INJECTION, SOLUTION INTRAVENOUS; SUBCUTANEOUS at 13:49

## 2017-04-04 RX ADMIN — HEPARIN SODIUM 5000 UNITS: 10000 INJECTION, SOLUTION INTRAVENOUS; SUBCUTANEOUS at 06:15

## 2017-04-04 RX ADMIN — ALBUMIN (HUMAN) 12.5 G: 12.5 SOLUTION INTRAVENOUS at 17:57

## 2017-04-04 RX ADMIN — MICONAZOLE NITRATE: 2 POWDER TOPICAL at 20:24

## 2017-04-04 RX ADMIN — IPRATROPIUM BROMIDE AND ALBUTEROL SULFATE 3 ML: .5; 3 SOLUTION RESPIRATORY (INHALATION) at 15:15

## 2017-04-04 RX ADMIN — DAPTOMYCIN 750 MG: 500 INJECTION, POWDER, LYOPHILIZED, FOR SOLUTION INTRAVENOUS at 11:35

## 2017-04-04 RX ADMIN — PROPOFOL 55 MCG/KG/MIN: 10 INJECTION, EMULSION INTRAVENOUS at 11:32

## 2017-04-04 RX ADMIN — FUROSEMIDE 40 MG: 10 INJECTION, SOLUTION INTRAVENOUS at 13:54

## 2017-04-04 RX ADMIN — ACETAMINOPHEN 650 MG: 160 SUSPENSION ORAL at 23:56

## 2017-04-04 RX ADMIN — PROPOFOL 55 MCG/KG/MIN: 10 INJECTION, EMULSION INTRAVENOUS at 02:15

## 2017-04-04 RX ADMIN — PROPOFOL 55 MCG/KG/MIN: 10 INJECTION, EMULSION INTRAVENOUS at 05:00

## 2017-04-04 RX ADMIN — PROPOFOL 55 MCG/KG/MIN: 10 INJECTION, EMULSION INTRAVENOUS at 06:37

## 2017-04-04 RX ADMIN — PIPERACILLIN SODIUM,TAZOBACTAM SODIUM 3.38 G: 3; .375 INJECTION, POWDER, FOR SOLUTION INTRAVENOUS at 07:31

## 2017-04-04 RX ADMIN — PIPERACILLIN SODIUM,TAZOBACTAM SODIUM 3.38 G: 3; .375 INJECTION, POWDER, FOR SOLUTION INTRAVENOUS at 20:24

## 2017-04-04 RX ADMIN — INSULIN ASPART 2 UNITS: 100 INJECTION, SOLUTION INTRAVENOUS; SUBCUTANEOUS at 16:40

## 2017-04-04 RX ADMIN — PANTOPRAZOLE SODIUM 40 MG: 40 INJECTION, POWDER, FOR SOLUTION INTRAVENOUS at 06:15

## 2017-04-04 RX ADMIN — FENTANYL CITRATE 50 MCG: 50 INJECTION INTRAMUSCULAR; INTRAVENOUS at 18:29

## 2017-04-04 RX ADMIN — PROPOFOL 55 MCG/KG/MIN: 10 INJECTION, EMULSION INTRAVENOUS at 23:17

## 2017-04-04 RX ADMIN — MICONAZOLE NITRATE: 2 POWDER TOPICAL at 08:37

## 2017-04-04 RX ADMIN — INSULIN ASPART 2 UNITS: 100 INJECTION, SOLUTION INTRAVENOUS; SUBCUTANEOUS at 23:53

## 2017-04-04 RX ADMIN — ALBUMIN (HUMAN) 12.5 G: 12.5 SOLUTION INTRAVENOUS at 13:54

## 2017-04-04 RX ADMIN — INSULIN ASPART 4 UNITS: 100 INJECTION, SOLUTION INTRAVENOUS; SUBCUTANEOUS at 11:48

## 2017-04-04 RX ADMIN — PROPOFOL 50 MCG/KG/MIN: 10 INJECTION, EMULSION INTRAVENOUS at 13:08

## 2017-04-04 RX ADMIN — SULFUR HEXAFLUORIDE 5 ML: KIT at 08:14

## 2017-04-04 RX ADMIN — FUROSEMIDE 40 MG: 10 INJECTION, SOLUTION INTRAVENOUS at 18:33

## 2017-04-04 RX ADMIN — QUETIAPINE FUMARATE 25 MG: 25 TABLET, FILM COATED ORAL at 21:12

## 2017-04-04 RX ADMIN — DEXMEDETOMIDINE HYDROCHLORIDE 0.7 MCG/KG/HR: 100 INJECTION, SOLUTION INTRAVENOUS at 09:39

## 2017-04-04 RX ADMIN — LIDOCAINE HYDROCHLORIDE 2 ML: 10 INJECTION, SOLUTION INFILTRATION; PERINEURAL at 20:24

## 2017-04-04 RX ADMIN — IPRATROPIUM BROMIDE AND ALBUTEROL SULFATE 3 ML: .5; 3 SOLUTION RESPIRATORY (INHALATION) at 11:27

## 2017-04-04 RX ADMIN — Medication 1 PACKET: at 07:31

## 2017-04-04 RX ADMIN — SODIUM CHLORIDE: 9 INJECTION, SOLUTION INTRAVENOUS at 02:15

## 2017-04-04 RX ADMIN — Medication 1 PACKET: at 21:12

## 2017-04-04 RX ADMIN — PIPERACILLIN SODIUM,TAZOBACTAM SODIUM 3.38 G: 3; .375 INJECTION, POWDER, FOR SOLUTION INTRAVENOUS at 13:49

## 2017-04-04 RX ADMIN — IPRATROPIUM BROMIDE AND ALBUTEROL SULFATE 3 ML: .5; 3 SOLUTION RESPIRATORY (INHALATION) at 06:57

## 2017-04-04 RX ADMIN — IPRATROPIUM BROMIDE AND ALBUTEROL SULFATE 3 ML: .5; 3 SOLUTION RESPIRATORY (INHALATION) at 20:19

## 2017-04-04 RX ADMIN — CHLORHEXIDINE GLUCONATE 15 ML: 1.2 RINSE ORAL at 20:24

## 2017-04-04 RX ADMIN — Medication 12.5 MG: at 18:31

## 2017-04-04 RX ADMIN — INSULIN ASPART 3 UNITS: 100 INJECTION, SOLUTION INTRAVENOUS; SUBCUTANEOUS at 21:10

## 2017-04-04 NOTE — PLAN OF CARE
Problem: Goal Outcome Summary  Goal: Goal Outcome Summary  Outcome: Declining  Incontinent watery stool seeping under wound vac dressing. Wound vac removed, 2 black foam pieces. Site cleansed out with sterile saline then packed with two kerlex rolls, wet-to-dry dressing. Message left for wound RN to assess site this morning. Groin excoriated. Right labia swollen, erythema. Rash to bilateral buttock.  VSS. Pt remains on full vent support with Propofol. Withdraws from pain, does not open her eyes. Low UO. TF started over night, at goal. Plan for ECHO today. Patient's mother updated over night. Will continue to monitor.

## 2017-04-04 NOTE — PROGRESS NOTES
Social Work Progress Note  Pt chart reviewed. Pt discussed in interdisciplinary rounds.     Intervention: Sw received a voice message from Hilda (Pt's mother). Hilda requested that sw returns her call. Sw contacted Hilda and got her voice mail. Sw left a message for Hilda with a call back number.     Plan:  Anticipated Discharge Placement: TBD  Barriers: None identified at this time.   Follow-up plan: Sw to continue to follow.    KATRINA Strauss, [facility]  [phone] 1520

## 2017-04-04 NOTE — PROGRESS NOTES
[facility] Nurse Inpatient Wound Assessment     Initial Assessment of wound(s) on pt's:   Rt brandie I & I site        Data:   Patient History:     Caro Landis is a 36 year old female who presents with a right groin mass, that she noted last Friday. The mass is painful, has increased from a 1x2 in size to a to a size of 2 x 5 in approximately, and it follows the anterior inguinal fold. The overlying skin is erythematous. She noted a low grade fever last Saturday and some chills. She has not had much of an appetite. She denies history of DM or MRSA infection. Hgb AIC elevated  She shaves her inguinal area. She works in .     Pt has had serial debridement of Rt groin with recent VAC placement.    17: Dr Bullock progress notes: Caro Landis is a 36 year old female with necrotizing soft tissue infection in right groin. This wound is healing. Unfortunately I do not think a wound vac is a great option given the location and issues with stooling. We will continue with wet to dry daily dressing changes per RN. She will need treatment for vaginal candidiasis and emre cares given excoriated perianal skin.       Moisture Management:  [place] for UIC / Internal fecal  placed on 17 for FIC    Catheter secured? Yes      Current Diet / Nutrition:     Active Diet Order      NPO Ice Chips, Meds             Jair Assessment and sub scores:   Jair Score  Av.1  Min: 10  Max: 23     Labs:         Recent Labs   Lab Test  17   0430   17   0530   17   1515   ALBUMIN  1.8*   --    --    --   1.7*   HGB  9.3*   < >   --    < >  10.8*   RBC  3.27*   < >   --    < >  3.78*   WBC  9.3   < >   --    < >  17.6*   PLT  339   < >   --    < >  244   INR   --    --    --    --   1.29*   A1C   --    --   10.4*   --    --     < > = values in this interval not displayed.          Wound Assessment (location):   Rt groin I & D site    Wound Base: 100% pink/red    Specific  Dimensions (length x width x depth, in cm) : approx 18.0cm x 7.0cm x 1.5cm     Tunneling: NA    Underminin-3cm from 9-3 o'clock    Palpation of the wound bed:  firm    Slough appearance:  none    Eschar appearance:  none    Periwound Skin: intact but macerated with light erythema and satellite lesions in both groins, upper inner thighs and buttocks, labia.  Pt appears to be having period    Temperature  normal    Drainage: Amount: scant . Color: serosanguinous     Odor: none    Pain:  unable to assess , Pt vented/sedated          Intervention:     Patient's chart evaluated.      Wound(s) was assessed    Wound Care: dressing change performed with Nursing                               Clean MicroKlenz                               Moistened Kerlix roll packed into wound bed and undermining                               Antifungal powder to groin, labia and upper inner thighs                               Sealed in with skin prep                               Estela-pad as cover dressing                               Secured to upper thigh only with Medipore tape                                Pad to labia for period containment    Orders  Dressing change updated in Epic. Antifungal powder as ordered    Supplies  In pt room    Discussed plan of care with Nursing          Assessment:       Rt groin I & D site: Vac discontinued secondary to FIC and difficulty creating seal with fungal rash         Will attempt to dry out rash with powder and possibly re-attempt VAC later in week     Bilateral groin, buttocks, upper inner thighs and labia: candida         Plan:     Nursing to notify the Provider(s) and re-consult the [facility] Nurse if wound(s) deteriorate(s) or if the wound care plan needs reevaluation.            Rt groin I & D site: change daily and prn as ordered by surgery                              Clean MicroKlenz                               Moistened Kerlix roll packed into wound bed and undermining                                Antifungal powder to groin, labia and upper inner thighs                               Sealed in with skin prep                               Estela-pad as cover dressing                               Secured to upper thigh only with Medipore tape                                Pad to labia for period containment      Air flow from pulsate mattress may assist with drying out of fungal rash        WOC Nurse will return: end of week     Face to face time: 30 minutes

## 2017-04-04 NOTE — PROGRESS NOTES
[facility] ICU RESPIRATORY NOTE  Date of Admission: 3/29/17  Date of Intubation (most recent): 3/29/17  Reason for Mechanical Ventilation:Acute respiratory failure  Number of Days on Mechanical Ventilation: 7  Met Criteria for Pressure Support Trial: Yes   Reason for No Pressure Support Trial: PS 5/7 2x for >2hrs    Ventilation Mode: CPAP/PS  FiO2 (%): 50 %  Rate Set (breaths/minute): 15 breaths/min  Tidal Volume Set (mL): 550 mL  PEEP (cm H2O): 7 cmH2O  Pressure Support (cm H2O): 5 cmH2O  Oxygen Concentration (%): 50 %  Resp: 22    ABG Results:     Recent Labs  Lab 04/04/17  0555 04/03/17  1015 04/01/17  0445 03/31/17  1747 03/31/17  1500 03/29/17  1515   PH 7.37 7.29* 7.28* 7.28* 7.27* 7.36   PCO2 34* 42 45 41 47* 41   PO2 83 95 151* 73* 73* 79*   HCO3 20* 20* 21 20* 21 23   O2PER 50 40%  --   --  50% 70%       Plan: Cont full vent support overnight.    4/4/2017  Deon Loyola, RRT

## 2017-04-04 NOTE — PROGRESS NOTES
"General Surgery Progress Note    Subjective: vac unfortunately removed due to significant soiling from bowel movements overnight. Wound covered with wet to dry dressing. Pt remains intubated/sedated    Objective: /63  Pulse 102  Temp 99.8  F (37.7  C) (Axillary)  Resp 21  Ht 5' 8\" (1.727 m)  Wt 282 lb 3 oz (128 kg)  SpO2 95%  BMI 42.91 kg/m2  Gen: sedated  CV: tachycardic  Pulm: mech vent  Abd: right groin - dressings removed and wound continues to appear healthy. Right labia erythematous and edematous consistent with candidiasis and overall dependent edema  Ext: WWP    Assessment/Plan:   Caro Landis  is a 36 year old female with necrotizing soft tissue infection in right groin. This wound is healing. Unfortunately I do not think a wound vac is a great option given the location and issues with stooling. We will continue with wet to dry daily dressing changes per RN. She will need treatment for vaginal candidiasis and emre cares given excoriated perianal skin.   - continue wet to dry dressing to right groin with kerlix.   - continue to work towards extubation as able  - appreciate ICU cares  - Wound RN consult for improved emre care/recommendations     Mary Beth Neal MD  [facility]  [phone]              "

## 2017-04-04 NOTE — PROGRESS NOTES
[facility] ICU RESPIRATORY NOTE  Date of Admission: 3/29/17  Date of Intubation (most recent): 3/29/17  Reason for Mechanical Ventilation:Acute respiratory failure  Number of Days on Mechanical Ventilation: 7  Met Criteria for Pressure Support Trial: Yes   Reason for No Pressure Support Trial: Per MD.    Patient went back to OR for washout.  Ventilation Mode: CMV/AC  FiO2 (%): 50 %  Rate Set (breaths/minute): 15 breaths/min  Tidal Volume Set (mL): 550 mL  PEEP (cm H2O): 7 cmH2O  Oxygen Concentration (%): 50 %  Resp: 17    ABG Results:    Recent Labs  Lab 04/03/17  1015 04/01/17  0445 03/31/17  1747 03/31/17  1500 03/29/17  1515   PH 7.29* 7.28* 7.28* 7.27* 7.36   PCO2 42 45 41 47* 41   PO2 95 151* 73* 73* 79*   HCO3 20* 21 20* 21 23   O2PER 40%  --   --  50% 70%       Plan:  Patient continue full ventilatory support and PS trial in [place].    Ruy Moscoso RRT  4/4/2017

## 2017-04-04 NOTE — PROGRESS NOTES
[facility]    Critical Care Service  Progress Note    Date of Service (when I saw the patient): 04/04/2017     Assessment & Plan   Caro Landis is a 36 year old female with PMHx of PCOS and HLD [facility] with a swollen painful R groin found to have necrotizing fasciitis now s/p debridement in the OR on 3/29. The patient was admitted to the ICU due to hypotensive from presumed septic shock. She remains intubated after going to the OR for washouts.       Neuro  1. Acute pain  2. Sedation  Plan:  -- fentanyl bumps for pain management 25-50mcg/2hr   -- Wean precedex prior to extubation       CV  1. Hypotension, likely septic shock in the setting of necrotizing fascitis. Random cortisol levels low but given that pressor needs have not increased, okay to hold off of stress dose steroids, resolved.   2. Hypertriglyceridemia, likely related to PCOS  Plan:  -- F/U echo results       Resp:  1. Acute respiratory failure, post-operatively secondary to acute illness. Remains intubated post operatively for airway protection. Required increased PEEP over the weekend- CXR unchanged, no significant secretions. Intermittently high Paw of unclear etiology, on duonebs.  Plan:  -- PS today, with hopes of extubation   -- Continue duonebs scheduled       GI/Nutrition  1. No prior hx  2. Nutrition: tube feeds started 4/3  Plan:  -- NPO  -- PPI  -- Continue TFs      Renal  1. DONNA. Baseline appears to be 0.6, creatinine increased to 2.47, now 1.92. May be secondary to contrast nephropathy, drugs (Vancomycin); UOP adequate so less likely pre-renal state. May be ATN from hypotension on admission, need to evaluate for AIN; no reason for crystal induced nephropathy.   2. Oliguria. UOP dropping off today, 30cc/hr  3. Hypernatremia, Na up trending today  4. Hyponatremia. Typically occurs with severe necrotizing fascitis, resolved after NS boluses and IVFs   5. Hypocalcemia  Plan:  -- Follow creatinine and  UOP  -- switched vanc to dapto with mild improvement in Cr  -- increase FW flushes to 60 Q2 today   -- give albumin bolus followed by lasix once       ID  1. Necrotizing fascitis of R groin now s/p debridement in the OR on 3/29 and 3/30. CT scan on admission shows extensive gas pattern in R groin suggestive of necrotizing fasciitis   1. Currently afebrile with no leukocytosis   2. Wound cultures with Prevotella and light growth corynebacterium  3. Clindamycin d/c'd   Plan:  -- continue zosyn and Daptomycin for necrotizing fascitis  -- follow up blood culture and wound culture results   -- Plan for wound vac placement today      Endocrine  1. Stress Hyperglycemia, likely related to necrotizing fascitis, resolving   2. PCOS. Previously on metformin and spironolactone   3. Impaired fasting glucose per chart review. HgbA1c 9.6 on admission   Plan:  -- High SSI  -- Keep BG <180 for optimal healing      Heme:  1. Acute blood loss anemia. Hgb 9.3  Plan:  -- Monitor hemoglobin.  -- Transfuse to keep > 7.0      MSK  1. Necrotizing fascitis, R leg and groin  Plan:  -- management plan per above       Skin  1. Redness of the face without apparent angioedema, some swelling of eyelids- resolved   2. Wound on R groin, necrotizing fascitis- erythema has not expanded past previously demarcated area, wound vac removed   Plan:  -- wound care- wet to dry  -- will continue to monitor facial rash       General cares:  DVT Prophylaxis: Heparin SQ  GI Prophylaxis: PPI  Restraints: Restraints for medical healing needed: YES  Family update by me today: No  Current lines are required for patient management  Access:  L JOHN 3/29     Rosario Ward PA-C  Pager: [phone]    Time Spent on this Encounter   Billing:  I spent 60 minutes bedside and on the inpatient unit today managing the critical care of Caro Landis in relation to the issues listed in this note.    Main Plans for Today   - wean off propofol and precedex  - PS  - extubate if able   -  increase FW    Interval History   Course reviewed. No acute events overnight. The wound vac was removed last night due to loose stool around the site. She remained intubated overnight. ROS is limited as the patient is sedated.     Physical Exam   Temp: 99.8  F (37.7  C) Temp src: Axillary Temp  Min: 97.8  F (36.6  C)  Max: 99.9  F (37.7  C) BP: 125/63   Heart Rate: 84 Resp: 21 SpO2: 95 % O2 Device: Mechanical Ventilator    Vitals:    04/01/17 0600 04/03/17 0400 04/04/17 0530   Weight: 125.3 kg (276 lb 3.8 oz) 126.5 kg (278 lb 14.1 oz) 128 kg (282 lb 3 oz)     I/O last 3 completed shifts:  In: 2200.63 [I.V.:2050.63; NG/GT:150]  Out: 885 [Urine:885]    GEN:  female, sedated on exam  EYES: PERRL, Anicteric sclera.   HEENT: Normocephalic, atraumatic, trachea midline, ETT secure  CV: RRR, no gallops, rubs, or murmurs  PULM/CHEST: Scattered rhonchi, no crackles or wheeze  GI: normal bowel sounds, soft, non-tender, no rebound tenderness or guarding  : wong catheter in place, urine yellow and clear, edema of the labia   EXTREMITIES: peripheral edema in lower extremities, moving all extremities, peripheral pulses intact  NEURO: sedated on exam. Pupils equal and reactive   SKIN: warm and dry   Imaging personally reviewed: CXR minimal change from prior  ECG- NSR    Medications     dexmedetomidine 0.5 mcg/kg/hr (04/04/17 1031)     IV fluid REPLACEMENT ONLY       NaCl 10 mL/hr at 04/04/17 0215       multivitamins with minerals  15 mL Per Feeding Tube Daily     protein modular  1 packet Per Feeding Tube 5x Daily     DAPTOmycin (CUBICIN) intermittent infusion  6 mg/kg Intravenous Q24H     miconazole   Topical BID     insulin aspart  1-12 Units Subcutaneous Q4H     ipratropium - albuterol 0.5 mg/2.5 mg/3 mL  3 mL Nebulization 4x daily     pantoprazole  40 mg Intravenous QAM AC     chlorhexidine  15 mL Mouth/Throat Q12H     heparin  5,000 Units Subcutaneous Q8H     piperacillin-tazobactam  3.375 g Intravenous Q6H        Data     Recent Labs  Lab 04/04/17  0430 04/03/17  0415 04/02/17  0530 04/01/17  1420 04/01/17  0445  03/29/17  1515 03/29/17  1000   WBC 9.3 8.5  --   --  10.0  < > 17.6* 17.2*   HGB 9.3* 8.8*  --   --  9.8*  < > 10.8* 13.9   MCV 87 88  --   --  86  < > 83 82    301  --   --  279  < > 244 279   INR  --   --   --   --   --   --  1.29*  --    * 147*  --  146* 147*  < > 137 130*   POTASSIUM 3.7 3.8  --  3.8 3.9  < > 3.5 3.9   CHLORIDE 116* 116*  --  113* 113*  < > 105 95   CO2 21 21  --  22 22  < > 23 26   BUN 19 16  --  10 9  < > 5* 6*   CR 1.92* 2.03* 2.40* 2.43* 2.47*  < > 0.58 0.66   ANIONGAP 12  --   --  11 12  < > 9 9   ROBERT 7.9* 7.5*  --  7.1* 7.2*  < > 6.8* 7.9*   * 118*  --  157* 143*  < > 213* 356*   ALBUMIN 1.8*  --   --   --   --   --  1.7* 2.3*   PROTTOTAL  --   --   --   --   --   --  5.3* 7.1   BILITOTAL  --   --   --   --   --   --  0.7 0.9   ALKPHOS  --   --   --   --   --   --  57 83   ALT  --   --   --   --   --   --  11 18   AST  --   --   --   --   --   --  10 8   < > = values in this interval not displayed.  Recent Results (from the past 24 hour(s))   US Lower Extremity Venous Duplex Bilateral    Narrative    BILATERAL LOWER EXTREMITY VENOUS DUPLEX ULTRASOUND  4/3/2017  5:02 PM     HISTORY: Rule out deep vein thrombosis.      FINDINGS: Right groin drainage catheter is in place. The deep veins in  the left lower extremity are compressible throughout. The deep veins  demonstrate normal venous augmentation, waveforms and color Doppler  flow.      Impression    IMPRESSION: No evidence of DVT.    REFUGIO RAMÍREZ MD   XR Chest Port 1 View    Narrative    XR CHEST PORT 1 VW  4/4/2017 5:32 AM     HISTORY:  pulmonary edema    COMPARISON: Film dated 4/3/2017    FINDINGS:  ET tube is in place. NG tube is again noted. Left jugular  venous catheter is in place. There is increased opacity in the bases  compatible with infiltrate or atelectasis. This is increased slightly  since for  4/3/2017.      Impression    IMPRESSION: Slight increase in the basilar opacities. No other change.    MARK BREWER MD

## 2017-04-05 ENCOUNTER — APPOINTMENT (OUTPATIENT)
Dept: GENERAL RADIOLOGY | Facility: CLINIC | Age: 37
DRG: 853 | End: 2017-04-05
Attending: PHYSICIAN ASSISTANT
Payer: COMMERCIAL

## 2017-04-05 LAB
ANION GAP SERPL CALCULATED.3IONS-SCNC: 9 MMOL/L (ref 3–14)
BASE DEFICIT BLDA-SCNC: 3 MMOL/L
BUN SERPL-MCNC: 23 MG/DL (ref 7–30)
CALCIUM SERPL-MCNC: 7.9 MG/DL (ref 8.5–10.1)
CHLORIDE SERPL-SCNC: 117 MMOL/L (ref 94–109)
CO2 SERPL-SCNC: 24 MMOL/L (ref 20–32)
CREAT SERPL-MCNC: 2.19 MG/DL (ref 0.52–1.04)
ERYTHROCYTE [DISTWIDTH] IN BLOOD BY AUTOMATED COUNT: 13.8 % (ref 10–15)
GFR SERPL CREATININE-BSD FRML MDRD: 25 ML/MIN/1.7M2
GLUCOSE BLDC GLUCOMTR-MCNC: 146 MG/DL (ref 70–99)
GLUCOSE BLDC GLUCOMTR-MCNC: 149 MG/DL (ref 70–99)
GLUCOSE BLDC GLUCOMTR-MCNC: 165 MG/DL (ref 70–99)
GLUCOSE BLDC GLUCOMTR-MCNC: 173 MG/DL (ref 70–99)
GLUCOSE BLDC GLUCOMTR-MCNC: 193 MG/DL (ref 70–99)
GLUCOSE SERPL-MCNC: 165 MG/DL (ref 70–99)
HCO3 BLD-SCNC: 22 MMOL/L (ref 21–28)
HCT VFR BLD AUTO: 26.8 % (ref 35–47)
HGB BLD-MCNC: 8.8 G/DL (ref 11.7–15.7)
MAGNESIUM SERPL-MCNC: 2.5 MG/DL (ref 1.6–2.3)
MCH RBC QN AUTO: 28.5 PG (ref 26.5–33)
MCHC RBC AUTO-ENTMCNC: 32.8 G/DL (ref 31.5–36.5)
MCV RBC AUTO: 87 FL (ref 78–100)
O2/TOTAL GAS SETTING VFR VENT: 50 %
OXYHGB MFR BLD: 97 % (ref 92–100)
PCO2 BLD: 38 MM HG (ref 35–45)
PH BLD: 7.37 PH (ref 7.35–7.45)
PHOSPHATE SERPL-MCNC: 3.5 MG/DL (ref 2.5–4.5)
PLATELET # BLD AUTO: 356 10E9/L (ref 150–450)
PO2 BLD: 95 MM HG (ref 80–105)
POTASSIUM SERPL-SCNC: 3.1 MMOL/L (ref 3.4–5.3)
POTASSIUM SERPL-SCNC: 3.6 MMOL/L (ref 3.4–5.3)
RBC # BLD AUTO: 3.09 10E12/L (ref 3.8–5.2)
SODIUM SERPL-SCNC: 150 MMOL/L (ref 133–144)
WBC # BLD AUTO: 7.8 10E9/L (ref 4–11)

## 2017-04-05 PROCEDURE — 99291 CRITICAL CARE FIRST HOUR: CPT | Performed by: PHYSICIAN ASSISTANT

## 2017-04-05 PROCEDURE — S0166 INJ OLANZAPINE 2.5MG: HCPCS | Performed by: PHYSICIAN ASSISTANT

## 2017-04-05 PROCEDURE — 94640 AIRWAY INHALATION TREATMENT: CPT | Mod: 76

## 2017-04-05 PROCEDURE — 83735 ASSAY OF MAGNESIUM: CPT | Performed by: PHYSICIAN ASSISTANT

## 2017-04-05 PROCEDURE — 40000008 ZZH STATISTIC AIRWAY CARE

## 2017-04-05 PROCEDURE — 27210338 ZZH CIRCUIT HUMID FACE/TRACH MSK

## 2017-04-05 PROCEDURE — 40000275 ZZH STATISTIC RCP TIME EA 10 MIN

## 2017-04-05 PROCEDURE — 94003 VENT MGMT INPAT SUBQ DAY: CPT

## 2017-04-05 PROCEDURE — 27210339 ZZH CIRCUIT HUMIDITY W/CPAP BIP

## 2017-04-05 PROCEDURE — 25000132 ZZH RX MED GY IP 250 OP 250 PS 637: Performed by: PHYSICIAN ASSISTANT

## 2017-04-05 PROCEDURE — E2402 NEG PRESS WOUND THERAPY PUMP: HCPCS

## 2017-04-05 PROCEDURE — 40000916 ZZH STATISTIC SITTER, NIGHT HOURS

## 2017-04-05 PROCEDURE — 25000132 ZZH RX MED GY IP 250 OP 250 PS 637: Performed by: SURGERY

## 2017-04-05 PROCEDURE — 85027 COMPLETE CBC AUTOMATED: CPT | Performed by: PHYSICIAN ASSISTANT

## 2017-04-05 PROCEDURE — 00000146 ZZHCL STATISTIC GLUCOSE BY METER IP

## 2017-04-05 PROCEDURE — 20000003 ZZH R&B ICU

## 2017-04-05 PROCEDURE — 25000125 ZZHC RX 250: Performed by: INTERNAL MEDICINE

## 2017-04-05 PROCEDURE — 84132 ASSAY OF SERUM POTASSIUM: CPT | Performed by: PHYSICIAN ASSISTANT

## 2017-04-05 PROCEDURE — 25000132 ZZH RX MED GY IP 250 OP 250 PS 637: Performed by: ANESTHESIOLOGY

## 2017-04-05 PROCEDURE — 25000125 ZZHC RX 250: Performed by: PHYSICIAN ASSISTANT

## 2017-04-05 PROCEDURE — 84100 ASSAY OF PHOSPHORUS: CPT | Performed by: PHYSICIAN ASSISTANT

## 2017-04-05 PROCEDURE — 40000915 ZZH STATISTIC SITTER, EVENING HOURS

## 2017-04-05 PROCEDURE — 25000128 H RX IP 250 OP 636: Performed by: INTERNAL MEDICINE

## 2017-04-05 PROCEDURE — 25000128 H RX IP 250 OP 636: Performed by: PHYSICIAN ASSISTANT

## 2017-04-05 PROCEDURE — 94660 CPAP INITIATION&MGMT: CPT

## 2017-04-05 PROCEDURE — 36600 WITHDRAWAL OF ARTERIAL BLOOD: CPT

## 2017-04-05 PROCEDURE — 71010 XR CHEST PORT 1 VW: CPT

## 2017-04-05 PROCEDURE — 80048 BASIC METABOLIC PNL TOTAL CA: CPT | Performed by: PHYSICIAN ASSISTANT

## 2017-04-05 PROCEDURE — 82805 BLOOD GASES W/O2 SATURATION: CPT | Performed by: PHYSICIAN ASSISTANT

## 2017-04-05 PROCEDURE — 25000132 ZZH RX MED GY IP 250 OP 250 PS 637: Performed by: INTERNAL MEDICINE

## 2017-04-05 RX ORDER — FUROSEMIDE 10 MG/ML
40 INJECTION INTRAMUSCULAR; INTRAVENOUS ONCE
Status: COMPLETED | OUTPATIENT
Start: 2017-04-05 | End: 2017-04-05

## 2017-04-05 RX ORDER — HALOPERIDOL 5 MG/ML
2 INJECTION INTRAMUSCULAR EVERY 6 HOURS PRN
Status: DISCONTINUED | OUTPATIENT
Start: 2017-04-05 | End: 2017-04-06

## 2017-04-05 RX ORDER — OLANZAPINE 10 MG/2ML
5 INJECTION, POWDER, FOR SOLUTION INTRAMUSCULAR EVERY 8 HOURS PRN
Status: DISCONTINUED | OUTPATIENT
Start: 2017-04-05 | End: 2017-04-05

## 2017-04-05 RX ORDER — OLANZAPINE 10 MG/2ML
5 INJECTION, POWDER, FOR SOLUTION INTRAMUSCULAR EVERY 12 HOURS PRN
Status: DISCONTINUED | OUTPATIENT
Start: 2017-04-05 | End: 2017-04-05

## 2017-04-05 RX ORDER — FENTANYL CITRATE 50 UG/ML
50 INJECTION, SOLUTION INTRAMUSCULAR; INTRAVENOUS ONCE
Status: COMPLETED | OUTPATIENT
Start: 2017-04-05 | End: 2017-04-05

## 2017-04-05 RX ORDER — HALOPERIDOL 5 MG/ML
1 INJECTION INTRAMUSCULAR 2 TIMES DAILY
Status: DISCONTINUED | OUTPATIENT
Start: 2017-04-05 | End: 2017-04-06

## 2017-04-05 RX ADMIN — DAPTOMYCIN 750 MG: 500 INJECTION, POWDER, LYOPHILIZED, FOR SOLUTION INTRAVENOUS at 12:01

## 2017-04-05 RX ADMIN — PROPOFOL 55 MCG/KG/MIN: 10 INJECTION, EMULSION INTRAVENOUS at 06:33

## 2017-04-05 RX ADMIN — PROPOFOL 55 MCG/KG/MIN: 10 INJECTION, EMULSION INTRAVENOUS at 09:11

## 2017-04-05 RX ADMIN — FUROSEMIDE 40 MG: 10 INJECTION, SOLUTION INTRAVENOUS at 12:07

## 2017-04-05 RX ADMIN — INSULIN ASPART 2 UNITS: 100 INJECTION, SOLUTION INTRAVENOUS; SUBCUTANEOUS at 08:51

## 2017-04-05 RX ADMIN — HALOPERIDOL LACTATE 1 MG: 5 INJECTION, SOLUTION INTRAMUSCULAR at 21:08

## 2017-04-05 RX ADMIN — PANTOPRAZOLE SODIUM 40 MG: 40 INJECTION, POWDER, FOR SOLUTION INTRAVENOUS at 05:44

## 2017-04-05 RX ADMIN — Medication 1 PACKET: at 12:02

## 2017-04-05 RX ADMIN — FENTANYL CITRATE 50 MCG: 50 INJECTION INTRAMUSCULAR; INTRAVENOUS at 18:20

## 2017-04-05 RX ADMIN — PIPERACILLIN SODIUM,TAZOBACTAM SODIUM 3.38 G: 3; .375 INJECTION, POWDER, FOR SOLUTION INTRAVENOUS at 02:45

## 2017-04-05 RX ADMIN — IPRATROPIUM BROMIDE AND ALBUTEROL SULFATE 3 ML: .5; 3 SOLUTION RESPIRATORY (INHALATION) at 19:55

## 2017-04-05 RX ADMIN — SODIUM CHLORIDE: 9 INJECTION, SOLUTION INTRAVENOUS at 08:53

## 2017-04-05 RX ADMIN — INSULIN ASPART 1 UNITS: 100 INJECTION, SOLUTION INTRAVENOUS; SUBCUTANEOUS at 20:59

## 2017-04-05 RX ADMIN — OLANZAPINE 5 MG: 10 INJECTION, POWDER, FOR SOLUTION INTRAMUSCULAR at 14:46

## 2017-04-05 RX ADMIN — MULTIVITAMIN 15 ML: LIQUID ORAL at 08:17

## 2017-04-05 RX ADMIN — Medication 1 PACKET: at 08:16

## 2017-04-05 RX ADMIN — HALOPERIDOL LACTATE 2 MG: 5 INJECTION, SOLUTION INTRAMUSCULAR at 18:19

## 2017-04-05 RX ADMIN — POTASSIUM CHLORIDE 20 MEQ: 1.5 POWDER, FOR SOLUTION ORAL at 08:54

## 2017-04-05 RX ADMIN — PIPERACILLIN SODIUM,TAZOBACTAM SODIUM 3.38 G: 3; .375 INJECTION, POWDER, FOR SOLUTION INTRAVENOUS at 08:17

## 2017-04-05 RX ADMIN — CHLORHEXIDINE GLUCONATE 15 ML: 1.2 RINSE ORAL at 08:16

## 2017-04-05 RX ADMIN — INSULIN ASPART 1 UNITS: 100 INJECTION, SOLUTION INTRAVENOUS; SUBCUTANEOUS at 04:00

## 2017-04-05 RX ADMIN — MICONAZOLE NITRATE: 2 POWDER TOPICAL at 21:03

## 2017-04-05 RX ADMIN — INSULIN ASPART 3 UNITS: 100 INJECTION, SOLUTION INTRAVENOUS; SUBCUTANEOUS at 16:45

## 2017-04-05 RX ADMIN — IPRATROPIUM BROMIDE AND ALBUTEROL SULFATE 3 ML: .5; 3 SOLUTION RESPIRATORY (INHALATION) at 14:38

## 2017-04-05 RX ADMIN — POTASSIUM CHLORIDE 40 MEQ: 1.5 POWDER, FOR SOLUTION ORAL at 05:45

## 2017-04-05 RX ADMIN — HEPARIN SODIUM 5000 UNITS: 10000 INJECTION, SOLUTION INTRAVENOUS; SUBCUTANEOUS at 05:42

## 2017-04-05 RX ADMIN — INSULIN ASPART 2 UNITS: 100 INJECTION, SOLUTION INTRAVENOUS; SUBCUTANEOUS at 12:00

## 2017-04-05 RX ADMIN — HEPARIN SODIUM 5000 UNITS: 10000 INJECTION, SOLUTION INTRAVENOUS; SUBCUTANEOUS at 21:01

## 2017-04-05 RX ADMIN — MICONAZOLE NITRATE: 2 POWDER TOPICAL at 12:02

## 2017-04-05 RX ADMIN — FENTANYL CITRATE 50 MCG: 50 INJECTION INTRAMUSCULAR; INTRAVENOUS at 16:44

## 2017-04-05 RX ADMIN — PROPOFOL 55 MCG/KG/MIN: 10 INJECTION, EMULSION INTRAVENOUS at 01:41

## 2017-04-05 RX ADMIN — ACETAMINOPHEN 650 MG: 160 SUSPENSION ORAL at 04:01

## 2017-04-05 RX ADMIN — HEPARIN SODIUM 5000 UNITS: 10000 INJECTION, SOLUTION INTRAVENOUS; SUBCUTANEOUS at 14:50

## 2017-04-05 RX ADMIN — PIPERACILLIN SODIUM,TAZOBACTAM SODIUM 3.38 G: 3; .375 INJECTION, POWDER, FOR SOLUTION INTRAVENOUS at 14:49

## 2017-04-05 RX ADMIN — IPRATROPIUM BROMIDE AND ALBUTEROL SULFATE 3 ML: .5; 3 SOLUTION RESPIRATORY (INHALATION) at 07:37

## 2017-04-05 RX ADMIN — FENTANYL CITRATE 50 MCG: 50 INJECTION INTRAMUSCULAR; INTRAVENOUS at 11:51

## 2017-04-05 RX ADMIN — PROPOFOL 55 MCG/KG/MIN: 10 INJECTION, EMULSION INTRAVENOUS at 04:03

## 2017-04-05 RX ADMIN — IPRATROPIUM BROMIDE AND ALBUTEROL SULFATE 3 ML: .5; 3 SOLUTION RESPIRATORY (INHALATION) at 11:26

## 2017-04-05 RX ADMIN — PIPERACILLIN SODIUM,TAZOBACTAM SODIUM 3.38 G: 3; .375 INJECTION, POWDER, FOR SOLUTION INTRAVENOUS at 20:52

## 2017-04-05 NOTE — PLAN OF CARE
Problem: Goal Outcome Summary  Goal: Goal Outcome Summary  Outcome: No Change  VSS. Pt remains on full vent support with Propofol. Precedex discontinued d/t increase in temperature after start of gtt. Peak temperature 101.5 F. Tylenol prn. Withdraws from pain, does not open her eyes. UO improved. Moseley catheter replaced d/t leakage of previous collection system. TF at goal. Potassium chloride replaced. Patient's mother updated over night. Will continue to monitor.

## 2017-04-05 NOTE — PROGRESS NOTES
[facility]    Critical Care Service  Progress Note    Date of Service (when I saw the patient): 04/05/2017     Assessment & Plan   Caro Landis is a 36 year old female with PMHx of PCOS and HLD who presents to [facility] ED on 3/29 with a swollen painful R groin found to have necrotizing fasciitis now s/p debridement in the OR on 3/29. The patient was admitted to the ICU due to hypotensive from presumed septic shock. She remains intubated after going to the OR for washouts.       Neuro  1. Acute pain  2. Sedation  Plan:  -- fentanyl bumps for pain management 25-50mcg/2hr   -- Wean propofol prior to extubation       CV  1. Hypotension, likely septic shock in the setting of necrotizing fascitis. Random cortisol levels low but given that pressor needs have not increased, okay to hold off of stress dose steroids, resolved.   2. Hypertriglyceridemia, likely related to PCOS  Plan:  -- HD stable       Resp:  1. Acute respiratory failure, post-operatively secondary to acute illness. Remains intubated post operatively for airway protection. Required increased PEEP over the weekend- CXR unchanged, no significant secretions. Intermittently high Paw of unclear etiology, on duonebs. PS trials went well today  Plan:  -- PS today, with hopes of extubation to BiPAP this afternoon  -- Continue duonebs scheduled       GI/Nutrition  1. No prior hx  2. Nutrition: tube feeds started 4/3  Plan:  -- NPO  -- PPI  -- Continue TFs      Renal  1. DONNA. Baseline appears to be 0.6, creatinine increased to 2.47, now downtrending. May be secondary to contrast nephropathy, drugs (Vancomycin); UOP adequate so less likely pre-renal state. May be ATN from hypotension on admission, need to evaluate for AIN; no reason for crystal induced nephropathy.   2. Oliguria. UOP dropping off, resolved.  3. Hypernatremia, Na up trending today  4. Hyponatremia. Typically occurs with severe necrotizing fascitis, resolved after NS boluses and IVFs    5. Hypocalcemia  6. Hypervolemia. +8 L, up 10 kg this admission. Given albumin followed by lasix x2 yesterday with good results   Plan:  -- Follow creatinine and UOP  -- switched vanc to dapto with mild improvement in Cr  -- increase FW flushes to 120 Q2 today   -- give one additional order of IV lasix       ID  1. Necrotizing fascitis of R groin now s/p debridement in the OR on 3/29 and 3/30. CT scan on admission shows extensive gas pattern in R groin suggestive of necrotizing fasciitis   1. Currently afebrile with no leukocytosis   2. Wound cultures with Prevotella and light growth corynebacterium  3. Clindamycin d/c'd   Plan:  -- continue zosyn and Daptomycin for necrotizing fascitis  -- follow up blood culture and wound culture results   -- Plan for wound vac placement today      Endocrine  1. Stress Hyperglycemia, likely related to necrotizing fascitis, resolving   2. PCOS. Previously on metformin and spironolactone   3. Impaired fasting glucose per chart review. HgbA1c 9.6 on admission   Plan:  -- High SSI  -- Keep BG <180 for optimal healing      Heme:  1. Acute blood loss anemia. Hgb 8.8  Plan:  -- Monitor hemoglobin.  -- Transfuse to keep > 7.0      MSK  1. Necrotizing fascitis, R leg and groin  Plan:  -- management plan per above       Skin  1. Redness of the face without apparent angioedema, some swelling of eyelids- resolved   2. Wound on R groin, necrotizing fascitis- erythema has not expanded past previously demarcated area, wound vac removed   3. Candida around R groin  Plan:  -- wound care- wet to dry  -- will continue to monitor facial rash   -- continue miconazole        General cares:  DVT Prophylaxis: Heparin SQ  GI Prophylaxis: PPI  Restraints: Restraints for medical healing needed: YES  Family update by me today: No  Current lines are required for patient management  Access:  L IJ 3/29      Rosario Ward PA-C  Pager: [phone]    Time Spent on this Encounter   Billing:  I spent 60 minutes  bedside and on the inpatient unit today managing the critical care of Caro Landis in relation to the issues listed in this note.    Main Plans for Today   - ABG  - wean propofol  - PS for possible extubation today  - F/U culture results  - replace potassium  - increase FW flushes     Interval History   Course reviewed. No acute events overnight. The patient developed a fever overnight thought to possibly be secondary to precedex gtt. This was stopped and the patient was re-cultured. She was maintained on propofol instead. She continues to have loose stools overnight with rectal tube in place. ROS is limited as the patient is sedated and intubated.     Physical Exam   Temp: 99.5  F (37.5  C) Temp src: Bladder Temp  Min: 99.2  F (37.3  C)  Max: 101.5  F (38.6  C) BP: 118/60   Heart Rate: 67 Resp: 19 SpO2: 99 % O2 Device: Mechanical Ventilator    Vitals:    04/03/17 0400 04/04/17 0530 04/05/17 0500   Weight: 126.5 kg (278 lb 14.1 oz) 128 kg (282 lb 3 oz) 124.5 kg (274 lb 7.6 oz)     I/O last 3 completed shifts:  In: 2528.96 [I.V.:1583.96; NG/GT:650]  Out: 4910 [Urine:4910]    GEN:  female, sedated on exam  EYES: PERRL, Anicteric sclera.   HEENT: Normocephalic, atraumatic, trachea midline, ETT secure  CV: RRR, no gallops, rubs, or murmurs  PULM/CHEST: Scattered rhonchi, no crackles or wheeze  GI: normal bowel sounds, soft, non-tender, no rebound tenderness or guarding  : wong catheter in place, urine yellow and clear, edema of the labia   EXTREMITIES: peripheral edema in lower extremities, moving all extremities, peripheral pulses intact  NEURO: sedated on exam. Pupils equal and reactive   SKIN: warm and dry   Imaging personally reviewed: CXR minimal change from prior  ECG- NSR    Medications     propofol (DIPRIVAN) infusion 55 mcg/kg/min (04/05/17 0911)     IV fluid REPLACEMENT ONLY       NaCl 10 mL/hr at 04/05/17 0853       QUEtiapine  25 mg Oral At Bedtime     multivitamins with minerals  15 mL Per  Feeding Tube Daily     protein modular  1 packet Per Feeding Tube 5x Daily     DAPTOmycin (CUBICIN) intermittent infusion  6 mg/kg Intravenous Q24H     miconazole   Topical BID     insulin aspart  1-12 Units Subcutaneous Q4H     ipratropium - albuterol 0.5 mg/2.5 mg/3 mL  3 mL Nebulization 4x daily     pantoprazole  40 mg Intravenous QAM AC     chlorhexidine  15 mL Mouth/Throat Q12H     heparin  5,000 Units Subcutaneous Q8H     piperacillin-tazobactam  3.375 g Intravenous Q6H       Data     Recent Labs  Lab 04/05/17  0400 04/04/17  0430 04/03/17  0415  04/01/17  1420  03/29/17  1515 03/29/17  1000   WBC 7.8 9.3 8.5  --   --   < > 17.6* 17.2*   HGB 8.8* 9.3* 8.8*  --   --   < > 10.8* 13.9   MCV 87 87 88  --   --   < > 83 82    339 301  --   --   < > 244 279   INR  --   --   --   --   --   --  1.29*  --    * 149* 147*  --  146*  < > 137 130*   POTASSIUM 3.1* 3.7 3.8  --  3.8  < > 3.5 3.9   CHLORIDE 117* 116* 116*  --  113*  < > 105 95   CO2 24 21 21  --  22  < > 23 26   BUN 23 19 16  --  10  < > 5* 6*   CR 2.19* 1.92* 2.03*  < > 2.43*  < > 0.58 0.66   ANIONGAP 9 12  --   --  11  < > 9 9   ROBERT 7.9* 7.9* 7.5*  --  7.1*  < > 6.8* 7.9*   * 130* 118*  --  157*  < > 213* 356*   ALBUMIN  --  1.8*  --   --   --   --  1.7* 2.3*   PROTTOTAL  --   --   --   --   --   --  5.3* 7.1   BILITOTAL  --   --   --   --   --   --  0.7 0.9   ALKPHOS  --   --   --   --   --   --  57 83   ALT  --   --   --   --   --   --  11 18   AST  --   --   --   --   --   --  10 8   < > = values in this interval not displayed.  Recent Results (from the past 24 hour(s))   XR Abdomen Port 1 View    Narrative    PORTABLE ABDOMEN ONE VIEW   4/4/2017 4:26 PM     INDICATION: Orogastric tube placement.    COMPARISON: None.      Impression    IMPRESSION: Enteric tube tip is in the right upper quadrant in the  region of the gastric antrum or proximal duodenum.    MARTÍNEZ POOLE MD   XR Chest Port 1 View    Narrative    XR CHEST PORT 1 VW   4/5/2017 5:26 AM     HISTORY:  pulmonary congestion    COMPARISON: Film performed on 4/4/2017    FINDINGS:  ET tube is in place. Left jugular venous catheter is again  noted. The tip is at the junction of the superior vena cava and right  atrium. Small amount of right basilar opacity compatible with a small  area of infiltrate or atelectasis. This is unchanged. Left basilar  opacity is also unchanged.      Impression    IMPRESSION: Stable, no significant change since 4/4/2017.    MARK BREWER MD

## 2017-04-05 NOTE — PROGRESS NOTES
VSS, pt was tried on PS, and did ok. Was switch from prop drip to precedex. Pt opens her eyes but does not follow commends. Has been getting lasix and albumin for weight being up from admission. Mother had visited the patient and updated w plan of care.

## 2017-04-05 NOTE — PROGRESS NOTES
[facility] ICU RESPIRATORY NOTE  Date of Admission: 3/29/29  Date of Intubation (most recent): 3/29/29  Reason for Mechanical Ventilation: respiratory failure  Number of Days on Mechanical Ventilation: 8  MReason for No Pressure Support Trial: resting for the night  Ventilation Mode: CMV/AC  FiO2 (%): 50 %  Rate Set (breaths/minute): 14 breaths/min  Tidal Volume Set (mL): 580 mL  PEEP (cm H2O): 8 cmH2O  Pressure Support (cm H2O): 5 cmH2O  Oxygen Concentration (%): 50 %  Resp: 21    Significant Events Today: none during this shift    ABG Results:  Results for ZAYDA SQUIRES (MRN [id number]) as of 4/5/2017 05:08   Ref. Range 4/3/2017 10:15 4/4/2017 05:55   pH Arterial Latest Ref Range: 7.35 - 7.45 pH 7.29 (L) 7.37   pCO2 Arterial Latest Ref Range: 35 - 45 mm Hg 42 34 (L)   PO2 Arterial Latest Ref Range: 80 - 105 mm Hg 95 83   Bicarbonate Arterial Latest Ref Range: 21 - 28 mmol/L 20 (L) 20 (L)   Base Deficit Art Latest Units: mmol/L 5.8 5.2   FIO2 Unknown 40% 50   Oxyhemoglobin Arterial Latest Ref Range: 92 - 100 % 96 95       ETT appearance on chest x-ray: same position    Plan:  Pt remains full vent throughout the night. RT will follow for daily PS trial in the morning.  4/5/2017  Bradley Garcia

## 2017-04-05 NOTE — PROGRESS NOTES
Patient extubated to BIPAP 10/6 50% at 12:30. Pt suctioned pre and post extubation. BBS clear. Pt has stridor and MD notified. Vitals sre RR 22, HR 89, and SpO2 97%. RT will continue to follow.  4/5/2017  Boby Randall

## 2017-04-06 ENCOUNTER — APPOINTMENT (OUTPATIENT)
Dept: SPEECH THERAPY | Facility: CLINIC | Age: 37
DRG: 853 | End: 2017-04-06
Attending: INTERNAL MEDICINE
Payer: COMMERCIAL

## 2017-04-06 LAB
ANION GAP SERPL CALCULATED.3IONS-SCNC: 10 MMOL/L (ref 3–14)
BACTERIA SPEC CULT: ABNORMAL
BACTERIA SPEC CULT: ABNORMAL
BUN SERPL-MCNC: 24 MG/DL (ref 7–30)
CALCIUM SERPL-MCNC: 8.1 MG/DL (ref 8.5–10.1)
CHLORIDE SERPL-SCNC: 118 MMOL/L (ref 94–109)
CO2 SERPL-SCNC: 24 MMOL/L (ref 20–32)
CREAT SERPL-MCNC: 1.89 MG/DL (ref 0.52–1.04)
ERYTHROCYTE [DISTWIDTH] IN BLOOD BY AUTOMATED COUNT: 13.7 % (ref 10–15)
GFR SERPL CREATININE-BSD FRML MDRD: 30 ML/MIN/1.7M2
GLUCOSE BLDC GLUCOMTR-MCNC: 129 MG/DL (ref 70–99)
GLUCOSE BLDC GLUCOMTR-MCNC: 143 MG/DL (ref 70–99)
GLUCOSE BLDC GLUCOMTR-MCNC: 148 MG/DL (ref 70–99)
GLUCOSE BLDC GLUCOMTR-MCNC: 156 MG/DL (ref 70–99)
GLUCOSE BLDC GLUCOMTR-MCNC: 168 MG/DL (ref 70–99)
GLUCOSE BLDC GLUCOMTR-MCNC: 242 MG/DL (ref 70–99)
GLUCOSE SERPL-MCNC: 141 MG/DL (ref 70–99)
HCT VFR BLD AUTO: 27.7 % (ref 35–47)
HGB BLD-MCNC: 9 G/DL (ref 11.7–15.7)
Lab: ABNORMAL
MAGNESIUM SERPL-MCNC: 2.4 MG/DL (ref 1.6–2.3)
MCH RBC QN AUTO: 28.2 PG (ref 26.5–33)
MCHC RBC AUTO-ENTMCNC: 32.5 G/DL (ref 31.5–36.5)
MCV RBC AUTO: 87 FL (ref 78–100)
MICRO REPORT STATUS: ABNORMAL
MICRO REPORT STATUS: ABNORMAL
PHOSPHATE SERPL-MCNC: 3.6 MG/DL (ref 2.5–4.5)
PLATELET # BLD AUTO: 387 10E9/L (ref 150–450)
POTASSIUM SERPL-SCNC: 3.3 MMOL/L (ref 3.4–5.3)
POTASSIUM SERPL-SCNC: 3.7 MMOL/L (ref 3.4–5.3)
RBC # BLD AUTO: 3.19 10E12/L (ref 3.8–5.2)
SODIUM SERPL-SCNC: 149 MMOL/L (ref 133–144)
SODIUM SERPL-SCNC: 151 MMOL/L (ref 133–144)
SODIUM SERPL-SCNC: 152 MMOL/L (ref 133–144)
SPECIMEN SOURCE: ABNORMAL
SPECIMEN SOURCE: ABNORMAL
WBC # BLD AUTO: 8.3 10E9/L (ref 4–11)

## 2017-04-06 PROCEDURE — 83735 ASSAY OF MAGNESIUM: CPT | Performed by: PHYSICIAN ASSISTANT

## 2017-04-06 PROCEDURE — 40000225 ZZH STATISTIC SLP WARD VISIT: Performed by: SPEECH-LANGUAGE PATHOLOGIST

## 2017-04-06 PROCEDURE — 25000128 H RX IP 250 OP 636: Performed by: PHYSICIAN ASSISTANT

## 2017-04-06 PROCEDURE — 84295 ASSAY OF SERUM SODIUM: CPT | Performed by: INTERNAL MEDICINE

## 2017-04-06 PROCEDURE — 25000128 H RX IP 250 OP 636: Performed by: INTERNAL MEDICINE

## 2017-04-06 PROCEDURE — 25000132 ZZH RX MED GY IP 250 OP 250 PS 637: Performed by: PHYSICIAN ASSISTANT

## 2017-04-06 PROCEDURE — 84295 ASSAY OF SERUM SODIUM: CPT | Performed by: PHYSICIAN ASSISTANT

## 2017-04-06 PROCEDURE — 40000915 ZZH STATISTIC SITTER, EVENING HOURS

## 2017-04-06 PROCEDURE — 36415 COLL VENOUS BLD VENIPUNCTURE: CPT | Performed by: INTERNAL MEDICINE

## 2017-04-06 PROCEDURE — 25000125 ZZHC RX 250

## 2017-04-06 PROCEDURE — 84100 ASSAY OF PHOSPHORUS: CPT | Performed by: PHYSICIAN ASSISTANT

## 2017-04-06 PROCEDURE — 40000914 ZZH STATISTIC SITTER, DAY HOURS

## 2017-04-06 PROCEDURE — 93005 ELECTROCARDIOGRAM TRACING: CPT

## 2017-04-06 PROCEDURE — 94640 AIRWAY INHALATION TREATMENT: CPT | Mod: 76

## 2017-04-06 PROCEDURE — S0166 INJ OLANZAPINE 2.5MG: HCPCS | Performed by: PHYSICIAN ASSISTANT

## 2017-04-06 PROCEDURE — 00000146 ZZHCL STATISTIC GLUCOSE BY METER IP

## 2017-04-06 PROCEDURE — 80048 BASIC METABOLIC PNL TOTAL CA: CPT | Performed by: PHYSICIAN ASSISTANT

## 2017-04-06 PROCEDURE — 84132 ASSAY OF SERUM POTASSIUM: CPT | Performed by: PHYSICIAN ASSISTANT

## 2017-04-06 PROCEDURE — 85027 COMPLETE CBC AUTOMATED: CPT | Performed by: PHYSICIAN ASSISTANT

## 2017-04-06 PROCEDURE — E2402 NEG PRESS WOUND THERAPY PUMP: HCPCS

## 2017-04-06 PROCEDURE — 40000983 ZZH STATISTIC HFNC ADULT NON-CPAP

## 2017-04-06 PROCEDURE — 93010 ELECTROCARDIOGRAM REPORT: CPT | Performed by: INTERNAL MEDICINE

## 2017-04-06 PROCEDURE — 99291 CRITICAL CARE FIRST HOUR: CPT | Performed by: PHYSICIAN ASSISTANT

## 2017-04-06 PROCEDURE — 25000125 ZZHC RX 250: Performed by: ANESTHESIOLOGY

## 2017-04-06 PROCEDURE — 40000916 ZZH STATISTIC SITTER, NIGHT HOURS

## 2017-04-06 PROCEDURE — 92526 ORAL FUNCTION THERAPY: CPT | Mod: GN | Performed by: SPEECH-LANGUAGE PATHOLOGIST

## 2017-04-06 PROCEDURE — 92610 EVALUATE SWALLOWING FUNCTION: CPT | Mod: GN | Performed by: SPEECH-LANGUAGE PATHOLOGIST

## 2017-04-06 PROCEDURE — 94640 AIRWAY INHALATION TREATMENT: CPT

## 2017-04-06 PROCEDURE — 20000003 ZZH R&B ICU

## 2017-04-06 PROCEDURE — 40000275 ZZH STATISTIC RCP TIME EA 10 MIN

## 2017-04-06 PROCEDURE — 25000125 ZZHC RX 250: Performed by: PHYSICIAN ASSISTANT

## 2017-04-06 RX ORDER — QUETIAPINE FUMARATE 25 MG/1
25 TABLET, FILM COATED ORAL 2 TIMES DAILY
Status: DISCONTINUED | OUTPATIENT
Start: 2017-04-06 | End: 2017-04-09

## 2017-04-06 RX ORDER — OLANZAPINE 10 MG/2ML
5 INJECTION, POWDER, FOR SOLUTION INTRAMUSCULAR ONCE
Status: COMPLETED | OUTPATIENT
Start: 2017-04-06 | End: 2017-04-06

## 2017-04-06 RX ORDER — OLANZAPINE 10 MG/2ML
5 INJECTION, POWDER, FOR SOLUTION INTRAMUSCULAR DAILY PRN
Status: DISCONTINUED | OUTPATIENT
Start: 2017-04-06 | End: 2017-04-13 | Stop reason: HOSPADM

## 2017-04-06 RX ADMIN — FENTANYL CITRATE 50 MCG: 50 INJECTION INTRAMUSCULAR; INTRAVENOUS at 00:03

## 2017-04-06 RX ADMIN — IPRATROPIUM BROMIDE AND ALBUTEROL SULFATE 3 ML: .5; 3 SOLUTION RESPIRATORY (INHALATION) at 11:10

## 2017-04-06 RX ADMIN — MICONAZOLE NITRATE: 2 POWDER TOPICAL at 21:29

## 2017-04-06 RX ADMIN — DEXMEDETOMIDINE HYDROCHLORIDE 0.3 MCG/KG/HR: 100 INJECTION, SOLUTION INTRAVENOUS at 21:13

## 2017-04-06 RX ADMIN — POTASSIUM CHLORIDE 20 MEQ: 29.8 INJECTION, SOLUTION INTRAVENOUS at 10:37

## 2017-04-06 RX ADMIN — DAPTOMYCIN 750 MG: 500 INJECTION, POWDER, LYOPHILIZED, FOR SOLUTION INTRAVENOUS at 10:36

## 2017-04-06 RX ADMIN — HEPARIN SODIUM 5000 UNITS: 10000 INJECTION, SOLUTION INTRAVENOUS; SUBCUTANEOUS at 06:06

## 2017-04-06 RX ADMIN — FENTANYL CITRATE 25 MCG: 50 INJECTION INTRAMUSCULAR; INTRAVENOUS at 08:13

## 2017-04-06 RX ADMIN — FENTANYL CITRATE 50 MCG: 50 INJECTION INTRAMUSCULAR; INTRAVENOUS at 06:19

## 2017-04-06 RX ADMIN — FENTANYL CITRATE 50 MCG: 50 INJECTION INTRAMUSCULAR; INTRAVENOUS at 02:21

## 2017-04-06 RX ADMIN — HEPARIN SODIUM 5000 UNITS: 10000 INJECTION, SOLUTION INTRAVENOUS; SUBCUTANEOUS at 15:05

## 2017-04-06 RX ADMIN — INSULIN ASPART 1 UNITS: 100 INJECTION, SOLUTION INTRAVENOUS; SUBCUTANEOUS at 12:45

## 2017-04-06 RX ADMIN — POTASSIUM CHLORIDE 20 MEQ: 29.8 INJECTION, SOLUTION INTRAVENOUS at 06:58

## 2017-04-06 RX ADMIN — IPRATROPIUM BROMIDE AND ALBUTEROL SULFATE 3 ML: .5; 3 SOLUTION RESPIRATORY (INHALATION) at 07:34

## 2017-04-06 RX ADMIN — INSULIN ASPART 1 UNITS: 100 INJECTION, SOLUTION INTRAVENOUS; SUBCUTANEOUS at 05:29

## 2017-04-06 RX ADMIN — PANTOPRAZOLE SODIUM 40 MG: 40 INJECTION, POWDER, FOR SOLUTION INTRAVENOUS at 08:10

## 2017-04-06 RX ADMIN — FENTANYL CITRATE 50 MCG: 50 INJECTION INTRAMUSCULAR; INTRAVENOUS at 20:14

## 2017-04-06 RX ADMIN — INSULIN ASPART 5 UNITS: 100 INJECTION, SOLUTION INTRAVENOUS; SUBCUTANEOUS at 20:18

## 2017-04-06 RX ADMIN — OLANZAPINE 5 MG: 10 INJECTION, POWDER, LYOPHILIZED, FOR SOLUTION INTRAMUSCULAR at 11:25

## 2017-04-06 RX ADMIN — IPRATROPIUM BROMIDE AND ALBUTEROL SULFATE 3 ML: .5; 3 SOLUTION RESPIRATORY (INHALATION) at 15:49

## 2017-04-06 RX ADMIN — DEXMEDETOMIDINE HYDROCHLORIDE 0.2 MCG/KG/HR: 100 INJECTION, SOLUTION INTRAVENOUS at 13:11

## 2017-04-06 RX ADMIN — INSULIN ASPART 2 UNITS: 100 INJECTION, SOLUTION INTRAVENOUS; SUBCUTANEOUS at 16:31

## 2017-04-06 RX ADMIN — FENTANYL CITRATE 50 MCG: 50 INJECTION INTRAMUSCULAR; INTRAVENOUS at 10:21

## 2017-04-06 RX ADMIN — SODIUM CHLORIDE: 9 INJECTION, SOLUTION INTRAVENOUS at 21:29

## 2017-04-06 RX ADMIN — MICONAZOLE NITRATE: 2 POWDER TOPICAL at 10:00

## 2017-04-06 RX ADMIN — QUETIAPINE FUMARATE 25 MG: 25 TABLET, FILM COATED ORAL at 21:19

## 2017-04-06 RX ADMIN — OLANZAPINE 5 MG: 10 INJECTION, POWDER, FOR SOLUTION INTRAMUSCULAR at 10:19

## 2017-04-06 RX ADMIN — HALOPERIDOL LACTATE 1 MG: 5 INJECTION, SOLUTION INTRAMUSCULAR at 08:04

## 2017-04-06 RX ADMIN — HEPARIN SODIUM 5000 UNITS: 10000 INJECTION, SOLUTION INTRAVENOUS; SUBCUTANEOUS at 21:13

## 2017-04-06 RX ADMIN — IPRATROPIUM BROMIDE AND ALBUTEROL SULFATE 3 ML: .5; 3 SOLUTION RESPIRATORY (INHALATION) at 19:34

## 2017-04-06 RX ADMIN — PIPERACILLIN SODIUM,TAZOBACTAM SODIUM 3.38 G: 3; .375 INJECTION, POWDER, FOR SOLUTION INTRAVENOUS at 02:20

## 2017-04-06 RX ADMIN — PIPERACILLIN SODIUM,TAZOBACTAM SODIUM 3.38 G: 3; .375 INJECTION, POWDER, FOR SOLUTION INTRAVENOUS at 08:07

## 2017-04-06 RX ADMIN — FENTANYL CITRATE 50 MCG: 50 INJECTION INTRAMUSCULAR; INTRAVENOUS at 04:13

## 2017-04-06 NOTE — PLAN OF CARE
Problem: Goal Outcome Summary  Goal: Goal Outcome Summary  Outcome: Improving  Orientation improved throughout night though remains disorientated to time, intermittently to situation. Follows commands, moves all extremities. Restless at times. Opens eyes spontaneously. Fentanyl prn for discomfort. UO adequate. Flexi-seal in place, draining. Plan for ST and PT to work with patient today. Will continue to monitor.

## 2017-04-06 NOTE — PLAN OF CARE
Problem: Goal Outcome Summary  Goal: Goal Outcome Summary  SLP:  Pt seen for a bedside swallow evaluation s/p extubation yesterday.  Pt intubated upon admission and remained intubated due to multiple OR procedures and some difficulty weaning from the vent.  Pt noted to be awake to day but continued to be drowsy. Pt presented with moderate oropharyngeal dysphagia marked by oral motor weakness w/ extra time for oral transit, w/ risk for reduced BOT retraction. Pt with delayed swallow response w/ reduced laryngeal elevation.   Pt demonstrated higher risk for aspiration with thin liquids.  Additional risk factors w/ dysphagia are length of intubation with reduced sensation likely.  Recommended diet is Nectar thick full liquids by spoon only in 1/2 tsp amounts. Pt should be fully alert and upright for all PO, allow time for pt to swallow before giving additional sips/bites.  Monitor for s/sx of aspiration.  ST will plan to follow daily for all diet tolerance and ability to adv diet safely.

## 2017-04-06 NOTE — PROGRESS NOTES
SPIRITUAL HEALTH SERVICES  Spiritual Assessment Progress Note  [facility] ICU   had a lengthy conversation with pt's mother.  She processed the stress of watching her daughter go through all of her medical issues and now her confusion and outbursts.  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  Mother would like to talk with a  to see if their are any state disability funds to help pt through this time.    Mother shared that pt has an ex- that rents a room at pt's house and it is ok that he comes to visit.  Mother also  processed the death of her  6 years ago and the important role pt and her brother play in mother's life.     listened to mother's story and provided emotional support.      SH will continue to follow.                                                                                                                                                 Svitlana Lancaster M.Div., [facility]  Staff    Pager [phone]

## 2017-04-06 NOTE — PROGRESS NOTES
[facility]    Critical Care Service  Progress Note    Date of Service (when I saw the patient): 04/06/2017     Assessment & Plan   Caro Landis is a 36 year old female with PMHx of PCOS and HLD who presents to [facility] ED on 3/29 with a swollen painful R groin found to have necrotizing fasciitis now s/p debridement in the OR on 3/29. The patient was admitted to the ICU due to hypotensive from presumed septic shock. She was intubated for the OR and remains intubated due to critical illness. She was extubated on 4/5/17.       Neuro  1. Acute delirium. Post-extubation, the patient has become acutely agitated. She was trailed on haldol and zyprexa but with minimal effect.    2. Acute pain  3. Sedation  Plan:  -- fentanyl bumps for pain management 25-50mcg/1hr   -- Will start precedex gtt today for delirium  -- PRN zyprexa as needed       CV  1. Hypotension, likely septic shock in the setting of necrotizing fascitis. Resolved   2. Hypertriglyceridemia, likely related to PCOS  Plan:  -- HD stable       Resp:  1. Acute respiratory failure, post-operatively secondary to acute illness. CXR during hospital course showed some vascular congestion after aggressive fluid resuscitation.  Extubated on 4/5 to BiPAP and now on highflow. Remains intubated post operatively for airway protection.   Plan:  -- Continue on high flow, wean down O2 requirements as able   -- Continue duonebs scheduled       GI/Nutrition  1. No prior hx  2. Nutrition: tube feeds started 4/3 and ended on 4/5 after extubation   Plan:  -- NPO  -- swallow evaluation prior to advancing diet       Renal  1. DONNA. Baseline appears to be 0.6, creatinine increased to 2.47, now downtrending. May be secondary to contrast nephropathy, drugs (Vancomycin); UOP adequate so less likely pre-renal state. May be ATN from hypotension on admission, need to evaluate for AIN; no reason for crystal induced nephropathy. Resolving   2. Oliguria. UOP dropping off, resolved.    3. Hypernatremia, Na continues to uptrend. Na now 152. Likely in setting of aggressive diuresis   4. Hyponatremia. Typically occurs with severe necrotizing fascitis, resolved after NS boluses and IVFs, resolved   5. Hypocalcemia  6. Hypervolemia. Now up 4L down from +8 L this admission. Aggressive diuresis with  lasix x2   Plan:  -- Follow creatinine and UOP  -- Will run medications through       ID  1. Necrotizing fascitis of R groin now s/p debridement in the OR on 3/29 and 3/30. CT scan on admission shows extensive gas pattern in R groin suggestive of necrotizing fasciitis   1. Currently afebrile with no leukocytosis   2. Wound cultures with Prevotella disiens and light growth corynebacterium  2. 10,000-50,000 Candida albicans in the urine. Not indicated to treat this at this time.  Plan:  -- D/C zosyn and daptomysin today, no further debridements indicated   -- Plan for wound vac placement at a later time (date is not set), waiting for improvement in skin breakdown/candida in the groin, surgery is following      Endocrine  1. Stress Hyperglycemia, likely related to necrotizing fascitis, resolving   2. PCOS. Previously on metformin and spironolactone   3. Impaired fasting glucose per chart review. HgbA1c 9.6 on admission   Plan:  -- High SSI  -- Keep BG <180 for optimal healing      Heme:  1. Acute blood loss anemia. Hgb 9.0  Plan:  -- Monitor hemoglobin.  -- Transfuse to keep > 7.0      MSK  1. Necrotizing fascitis, R leg and groin  Plan:  -- management plan per above       Skin  1. Redness of the face without apparent angioedema, some swelling of eyelids- resolved   2. Candida around R groin  Plan:  -- wound care- wet to dry  -- continue miconazole        General cares:  DVT Prophylaxis: Heparin SQ  GI Prophylaxis: PPI  Restraints: Restraints for medical healing needed: YES  Family update by me today: No  Current lines are required for patient management  Access:  L IJ 3/29      Rosario Ward PA-C  Pager:  [phone]    Time Spent on this Encounter   Billing:  I spent 60 minutes bedside and on the inpatient unit today managing the critical care of Caro Landis in relation to the issues listed in this note.    Main Plans for Today   - D/C abx  - add precedex  - run medications through alternatives to normal saline   - recheck sodiums   - PRN zyprexa     Interval History   Course reviewed.No acute events overnight. The patient was remained intermittently agitated overnight. She has remained stable from a respiratory status post-extubation. ROS is limited given the patient's current state.     Physical Exam   Temp: 98.7  F (37.1  C) Temp src: Oral Temp  Min: 98.5  F (36.9  C)  Max: 99.7  F (37.6  C) BP: (!) 161/103   Heart Rate: 61 Resp: 12 SpO2: 94 % O2 Device: High Flow Nasal Cannula (HFNC) Oxygen Delivery: Other (Comments) (30LPM)  Vitals:    04/04/17 0530 04/05/17 0500 04/06/17 0500   Weight: 128 kg (282 lb 3 oz) 124.5 kg (274 lb 7.6 oz) 115.9 kg (255 lb 8.2 oz)     I/O last 3 completed shifts:  In: 982.6 [I.V.:712.6; NG/GT:240]  Out: 5005 [Urine:4405; Stool:600]    GEN:  female, agitated on exam   EYES: PERRL, Anicteric sclera.   HEENT: Normocephalic, atraumatic, trachea midline  CV: RRR, no gallops, rubs, or murmurs  PULM/CHEST: Scattered rhonchi, no crackles or wheeze  GI: normal bowel sounds, soft, non-tender, no rebound tenderness or guarding  : wong catheter in place, urine yellow and clear, edema of the labia   EXTREMITIES: peripheral edema in lower extremities, moving all extremities, peripheral pulses intact  NEURO: sedated on exam. Pupils equal and reactive   SKIN: warm and dry   Imaging personally reviewed: No new imaging   ECG- NSR    Medications     dexmedetomidine       IV fluid REPLACEMENT ONLY       NaCl 10 mL/hr at 04/05/17 2059       miconazole   Topical BID     insulin aspart  1-12 Units Subcutaneous Q4H     ipratropium - albuterol 0.5 mg/2.5 mg/3 mL  3 mL Nebulization 4x daily      heparin  5,000 Units Subcutaneous Q8H       Data     Recent Labs  Lab 04/06/17  0530 04/05/17  1155 04/05/17  0400 04/04/17  0430   WBC 8.3  --  7.8 9.3   HGB 9.0*  --  8.8* 9.3*   MCV 87  --  87 87     --  356 339   *  --  150* 149*   POTASSIUM 3.3* 3.6 3.1* 3.7   CHLORIDE 118*  --  117* 116*   CO2 24  --  24 21   BUN 24  --  23 19   CR 1.89*  --  2.19* 1.92*   ANIONGAP 10  --  9 12   ROBERT 8.1*  --  7.9* 7.9*   *  --  165* 130*   ALBUMIN  --   --   --  1.8*     No results found for this or any previous visit (from the past 24 hour(s)).

## 2017-04-06 NOTE — PROGRESS NOTES
"General Surgery Progress Note    Agitated, restrained, extubated, on high flow nasal canula, rectal tube in place.   Vitals: Blood pressure 145/86, pulse 102, temperature 98.7  F (37.1  C), temperature source Oral, resp. rate 10, height 5' 8\" (1.727 m), weight 255 lb 8.2 oz (115.9 kg), SpO2 96 %.  Temperature Temp  Av.4  F (37.4  C)  Min: 98.5  F (36.9  C)  Max: 99.7  F (37.6  C)   I/O last 3 completed shifts:  In: 982.6 [I.V.:712.6; NG/GT:240]  Out: 5005 [Urine:4405; Stool:600]    Wound: clean, surrounding candidiasis, some skin breakdown in perianal area to left buttock.    Recent Labs   Lab Test  17   0530  17   0400  17   0430   WBC  8.3  7.8  9.3   HGB  9.0*  8.8*  9.3*   HCT  27.7*  26.8*  28.4*   PLT  387  356  339      Recent Labs   Lab Test  17   0530  17   1155  17   0400  17   0430   NA  152*   --   150*  149*   POTASSIUM  3.3*  3.6  3.1*  3.7   CHLORIDE  118*   --   117*  116*   CO2  24   --   24  21   BUN  24   --   23  19   CR  1.89*   --   2.19*  1.92*        36 year old female with necrotizing fascitis R groin/thigh S/P Procedure(s):  Right groin exploration with excisional debridement of necrotic tissue 3/30/17  Wound vac placement 4/3/17, off now.  - Dressing changed at bedside  - Continue wet to dry dressing bid  - WBC 8.3, on Daptomycin and Zosyn  - Appreciate [facility] assistance/recs  - Hope for wound vac placement when candidiasis improves, doubt tomorrow  - Continue ICU cares, appreciate help    Sandra Delatorre PA-C  [facility]  [phone]  "

## 2017-04-06 NOTE — PROGRESS NOTES
Pt on HFNC 30LPM 30% fiO2 all NOC. SpO2 96%. Will cont to follow for scheduled nebs and titration of O2 needs.     4/6/2017  Cassandra Benítez RT

## 2017-04-06 NOTE — PROGRESS NOTES
Patient is on HFNC 30L 40% and SpO2 98%. BBS clear. Scheduled neb tx given inline and tolerated well. Will continue to follow.  4/5/2017  Boby Randall

## 2017-04-06 NOTE — PLAN OF CARE
"Problem: Goal Outcome Summary  Goal: Goal Outcome Summary  Outcome: Improving  Episodes of agitation, not following commands, moving all extremities, crying and screaming at time stating \" they are hurting me,I need to go home, mom at bedside, bedside attendant and Haldol 2 mg given and prn fentanyl for dressing changes, HF o2 35 liters @ 40%      "

## 2017-04-06 NOTE — PROGRESS NOTES
04/06/17 1423   General Information   Onset Date 03/29/17   Start of Care Date 04/06/17   Referring Physician Dr. Gore   Patient Profile Review/OT: Additional Occupational Profile Info See Profile for full history and prior level of function   Patient/Family Goals Statement Pt did not state a clear goal    Swallowing Evaluation Bedside swallow evaluation   Behaviorial Observations (some reduced SHERRI)   Mode of current nutrition NPO   Respiratory Status O2 Supply   Type of O2 supply Nasal cannula  (HFNC w/ settings of 10L at 30% FiO2)   Clinical Swallow Evaluation   Oral Musculature (general weakness)   Structural Abnormalities none present   Dentition present and adequate   Mucosal Quality dry;cracked   Mandibular Strength and Mobility impaired  (general weakness)   Oral Labial Strength and Mobility impaired retraction;impaired pursing;impaired seal;impaired coordination  (general weakness)   Lingual Strength and Mobility impaired protrusion;impaired anterior elevation;impaired left lateral movement;impaired right lateral movement;impaired coordination  (general weakness)   Velar Elevation impaired  (general weakness)   Buccal Strength and Mobility (general weakness)   Laryngeal Function Cough;Voicing initiated;Swallow;Throat clear  (baseline wet cough noted)   Additional Documentation Yes   Clinical Swallow Eval: Thin Liquid Texture Trial   Mode of Presentation, Thin Liquids spoon;fed by clinician   Volume of Liquid or Food Presented small to medium sized ice chips   Oral Phase of Swallow Premature pharyngeal entry   Pharyngeal Phase of Swallow impaired;coughing/choking;reduction in laryngeal movement   Diagnostic Statement Pt with s/sx of possible aspiration with cough noted s/p med sized ice chips due to reduced airway protection w/ reduced laryngeal elevation and delayed swallow response.    Clinical Swallow Eval: Nectar Thick Liquid Texture Trial   Mode of Presentation, Nectar spoon;cup;fed by clinician    Volume of Nectar Presented 1/2 tsp trials via spoon and small drinks from a cup   Oral Phase, [place] (extra time for oral transit)   Pharyngeal Phase, Nectar impaired;reduction in laryngeal movement  (swallow delay)   Diagnostic Statement Pt w/ extra time for oral transit, delayed swallow response w/ reduced laryngeal elevation, Pt at risk but tolerated nectar thick liquids w/o outward s/sx of aspiration.    Clinical Swallow Eval: Puree Solid Texture Trial   Mode of Presentation, Puree spoon;fed by clinician   Volume of Puree Presented 1/2 tsp of apple sauce   Oral Phase, Puree (extra time for oral transit,  no oral residuals)   Pharyngeal Phase, Puree impaired;reduction in laryngeal movement   Diagnostic Statement Pt tolerated puree solids w/o outward s/sx of aspiration but at risk due to reduced laryngeal elevation and delayed swalllow response.   Esophageal Phase of Swallow   Esophageal comments none noted   General Therapy Interventions   Planned Therapy Interventions Dysphagia Treatment   Dysphagia treatment Compensatory strategies for swallowing;Modified diet education;Oropharyngeal exercise training;Instruction of safe swallow strategies   Swallow Eval: Clinical Impressions   Skilled Criteria for Therapy Intervention Skilled criteria met.  Treatment indicated.   Functional Assessment Scale (FAS) 3   Treatment Diagnosis Moderate oropharyngeal dysphagia   Diet texture recommendations Full liquid;Nectar thick liquids   Recommended Feeding/Eating Techniques (all liquids by spoon only in 1/2 tsp trials)   Demonstrates Need for Referral to Another Service dietitian;respiratory therapy;physical therapy;occupational therapy   Therapy Frequency daily   Predicted Duration of Therapy Intervention (days/wks) 2 weeks   Anticipated Discharge Disposition inpatient rehabilitation facility   Risks and Benefits of Treatment have been explained. Yes   Patient, family and/or staff in agreement with Plan of Care Yes   Clinical  Impression Comments SLP: Pt seen for a bedside swallow evaluation s/p extubation yesterday. Pt intubated upon admission and remained intubated due to multiple OR procedures and some difficulty weaning from the vent. Pt noted to be awake to day but continued to be drowsy. Pt presented with moderate oropharyngeal dysphagia marked by oral motor weakness w/ extra time for oral transit, w/ risk for reduced BOT retraction. Pt with delayed swallow response w/ reduced laryngeal elevation. Pt demonstrated higher risk for aspiration with thin liquids. Additional risk factors w/ dysphagia are length of intubation with reduced sensation likely.  Recommended diet is Nectar thick full liquids by spoon only in 1/2 tsp amounts. Pt should be fully alert and upright for all PO, allow time for pt to swallow before giving additional sips/bites. Monitor for s/sx of aspiration. ST will plan to follow daily for all diet tolerance and ability to adv diet safely.    Total Evaluation Time   Total Evaluation Time (Minutes) 25

## 2017-04-07 ENCOUNTER — APPOINTMENT (OUTPATIENT)
Dept: SPEECH THERAPY | Facility: CLINIC | Age: 37
DRG: 853 | End: 2017-04-07
Payer: COMMERCIAL

## 2017-04-07 LAB
ANION GAP SERPL CALCULATED.3IONS-SCNC: 7 MMOL/L (ref 3–14)
BUN SERPL-MCNC: 25 MG/DL (ref 7–30)
CALCIUM SERPL-MCNC: 8.1 MG/DL (ref 8.5–10.1)
CHLORIDE SERPL-SCNC: 118 MMOL/L (ref 94–109)
CO2 SERPL-SCNC: 24 MMOL/L (ref 20–32)
CREAT SERPL-MCNC: 1.82 MG/DL (ref 0.52–1.04)
ERYTHROCYTE [DISTWIDTH] IN BLOOD BY AUTOMATED COUNT: 13.8 % (ref 10–15)
GFR SERPL CREATININE-BSD FRML MDRD: 31 ML/MIN/1.7M2
GLUCOSE BLDC GLUCOMTR-MCNC: 196 MG/DL (ref 70–99)
GLUCOSE BLDC GLUCOMTR-MCNC: 201 MG/DL (ref 70–99)
GLUCOSE BLDC GLUCOMTR-MCNC: 214 MG/DL (ref 70–99)
GLUCOSE BLDC GLUCOMTR-MCNC: 218 MG/DL (ref 70–99)
GLUCOSE BLDC GLUCOMTR-MCNC: 224 MG/DL (ref 70–99)
GLUCOSE BLDC GLUCOMTR-MCNC: 227 MG/DL (ref 70–99)
GLUCOSE SERPL-MCNC: 202 MG/DL (ref 70–99)
HCT VFR BLD AUTO: 28.2 % (ref 35–47)
HGB BLD-MCNC: 9.2 G/DL (ref 11.7–15.7)
MAGNESIUM SERPL-MCNC: 2.8 MG/DL (ref 1.6–2.3)
MCH RBC QN AUTO: 28.3 PG (ref 26.5–33)
MCHC RBC AUTO-ENTMCNC: 32.6 G/DL (ref 31.5–36.5)
MCV RBC AUTO: 87 FL (ref 78–100)
PHOSPHATE SERPL-MCNC: 2.9 MG/DL (ref 2.5–4.5)
PLATELET # BLD AUTO: 384 10E9/L (ref 150–450)
POTASSIUM SERPL-SCNC: 3.8 MMOL/L (ref 3.4–5.3)
RBC # BLD AUTO: 3.25 10E12/L (ref 3.8–5.2)
SODIUM SERPL-SCNC: 149 MMOL/L (ref 133–144)
WBC # BLD AUTO: 7.9 10E9/L (ref 4–11)

## 2017-04-07 PROCEDURE — 20000003 ZZH R&B ICU

## 2017-04-07 PROCEDURE — 84100 ASSAY OF PHOSPHORUS: CPT | Performed by: PHYSICIAN ASSISTANT

## 2017-04-07 PROCEDURE — 25000128 H RX IP 250 OP 636: Performed by: PHYSICIAN ASSISTANT

## 2017-04-07 PROCEDURE — 40000225 ZZH STATISTIC SLP WARD VISIT

## 2017-04-07 PROCEDURE — 40000275 ZZH STATISTIC RCP TIME EA 10 MIN

## 2017-04-07 PROCEDURE — 83735 ASSAY OF MAGNESIUM: CPT | Performed by: PHYSICIAN ASSISTANT

## 2017-04-07 PROCEDURE — 25000132 ZZH RX MED GY IP 250 OP 250 PS 637: Performed by: PHYSICIAN ASSISTANT

## 2017-04-07 PROCEDURE — 99291 CRITICAL CARE FIRST HOUR: CPT | Performed by: PHYSICIAN ASSISTANT

## 2017-04-07 PROCEDURE — 40000914 ZZH STATISTIC SITTER, DAY HOURS

## 2017-04-07 PROCEDURE — 25000125 ZZHC RX 250: Performed by: PHYSICIAN ASSISTANT

## 2017-04-07 PROCEDURE — 36415 COLL VENOUS BLD VENIPUNCTURE: CPT | Performed by: PHYSICIAN ASSISTANT

## 2017-04-07 PROCEDURE — 84295 ASSAY OF SERUM SODIUM: CPT | Performed by: PHYSICIAN ASSISTANT

## 2017-04-07 PROCEDURE — 94640 AIRWAY INHALATION TREATMENT: CPT | Mod: 76

## 2017-04-07 PROCEDURE — 92526 ORAL FUNCTION THERAPY: CPT | Mod: GN

## 2017-04-07 PROCEDURE — 80048 BASIC METABOLIC PNL TOTAL CA: CPT | Performed by: PHYSICIAN ASSISTANT

## 2017-04-07 PROCEDURE — 85027 COMPLETE CBC AUTOMATED: CPT | Performed by: PHYSICIAN ASSISTANT

## 2017-04-07 PROCEDURE — 25000131 ZZH RX MED GY IP 250 OP 636 PS 637: Performed by: PHYSICIAN ASSISTANT

## 2017-04-07 PROCEDURE — 00000146 ZZHCL STATISTIC GLUCOSE BY METER IP

## 2017-04-07 PROCEDURE — E2402 NEG PRESS WOUND THERAPY PUMP: HCPCS

## 2017-04-07 PROCEDURE — 40000915 ZZH STATISTIC SITTER, EVENING HOURS

## 2017-04-07 RX ORDER — FLUCONAZOLE 200 MG/1
200 TABLET ORAL DAILY
Status: DISCONTINUED | OUTPATIENT
Start: 2017-04-07 | End: 2017-04-08

## 2017-04-07 RX ORDER — QUETIAPINE FUMARATE 25 MG/1
25 TABLET, FILM COATED ORAL ONCE
Status: COMPLETED | OUTPATIENT
Start: 2017-04-07 | End: 2017-04-07

## 2017-04-07 RX ADMIN — MICONAZOLE NITRATE: 2 POWDER TOPICAL at 11:00

## 2017-04-07 RX ADMIN — QUETIAPINE FUMARATE 25 MG: 25 TABLET, FILM COATED ORAL at 21:35

## 2017-04-07 RX ADMIN — QUETIAPINE FUMARATE 25 MG: 25 TABLET, FILM COATED ORAL at 08:09

## 2017-04-07 RX ADMIN — HEPARIN SODIUM 5000 UNITS: 10000 INJECTION, SOLUTION INTRAVENOUS; SUBCUTANEOUS at 22:38

## 2017-04-07 RX ADMIN — HEPARIN SODIUM 5000 UNITS: 10000 INJECTION, SOLUTION INTRAVENOUS; SUBCUTANEOUS at 15:16

## 2017-04-07 RX ADMIN — INSULIN ASPART 4 UNITS: 100 INJECTION, SOLUTION INTRAVENOUS; SUBCUTANEOUS at 16:21

## 2017-04-07 RX ADMIN — INSULIN ASPART 4 UNITS: 100 INJECTION, SOLUTION INTRAVENOUS; SUBCUTANEOUS at 08:07

## 2017-04-07 RX ADMIN — INSULIN ASPART 3 UNITS: 100 INJECTION, SOLUTION INTRAVENOUS; SUBCUTANEOUS at 04:44

## 2017-04-07 RX ADMIN — HEPARIN SODIUM 5000 UNITS: 10000 INJECTION, SOLUTION INTRAVENOUS; SUBCUTANEOUS at 05:49

## 2017-04-07 RX ADMIN — IPRATROPIUM BROMIDE AND ALBUTEROL SULFATE 3 ML: .5; 3 SOLUTION RESPIRATORY (INHALATION) at 07:14

## 2017-04-07 RX ADMIN — IPRATROPIUM BROMIDE AND ALBUTEROL SULFATE 3 ML: .5; 3 SOLUTION RESPIRATORY (INHALATION) at 20:35

## 2017-04-07 RX ADMIN — IPRATROPIUM BROMIDE AND ALBUTEROL SULFATE 3 ML: .5; 3 SOLUTION RESPIRATORY (INHALATION) at 12:47

## 2017-04-07 RX ADMIN — INSULIN ASPART 4 UNITS: 100 INJECTION, SOLUTION INTRAVENOUS; SUBCUTANEOUS at 12:21

## 2017-04-07 RX ADMIN — FLUCONAZOLE 200 MG: 200 TABLET ORAL at 12:04

## 2017-04-07 RX ADMIN — INSULIN GLARGINE 5 UNITS: 100 INJECTION, SOLUTION SUBCUTANEOUS at 22:38

## 2017-04-07 RX ADMIN — DEXMEDETOMIDINE HYDROCHLORIDE 0.2 MCG/KG/HR: 100 INJECTION, SOLUTION INTRAVENOUS at 12:11

## 2017-04-07 RX ADMIN — QUETIAPINE FUMARATE 25 MG: 25 TABLET, FILM COATED ORAL at 11:08

## 2017-04-07 RX ADMIN — MICONAZOLE NITRATE: 2 POWDER TOPICAL at 21:37

## 2017-04-07 RX ADMIN — INSULIN ASPART 3 UNITS: 100 INJECTION, SOLUTION INTRAVENOUS; SUBCUTANEOUS at 20:33

## 2017-04-07 RX ADMIN — INSULIN ASPART 3 UNITS: 100 INJECTION, SOLUTION INTRAVENOUS; SUBCUTANEOUS at 00:12

## 2017-04-07 NOTE — PLAN OF CARE
Problem: Goal Outcome Summary  Goal: Goal Outcome Summary  Outcome: Therapy, progress toward functional goals as expected  SLP: Pt seen for dysphagia f/u. Pt tolerated nectar thick liquids via spoon and cup, thin liquids via straw, pureed textures, and semisolid textures with no overt s/sx of aspiration. Semisolid textures resulted in mildly prolonged but functional time for mastication. Recommend advance to mechanical soft textures and thin liquids with feeding assistance/supervision. Pt should be fully upright and alert for all PO, take small single sips/bites, pace self, and alternate consistencies. ST to continue to follow for diet tolerance and advanced trials as appropriate. Anticipate pt will not require ongoing ST for dysphagia upon discharge.

## 2017-04-07 NOTE — PROGRESS NOTES
[facility]    Critical Care Service  Progress Note    Date of Service (when I saw the patient): 04/07/2017     Assessment & Plan   Caro Landis is a 36 year old female with PMHx of PCOS and HLD who presents to [facility] ED on 3/29 with a swollen painful R groin found to have necrotizing fasciitis now s/p debridement in the OR on 3/29. The patient was admitted to the ICU due to hypotensive from presumed septic shock. She was intubated for the OR and remained intubated due to critical illness. She was extubated on 4/5/17 but now remains in the ICU with ICU delirium.      3/29- admitted and taken to OR for debridement, came back to ICU intubated  3/30- OR for debridement   4/3- wound vac placed in OR  4/4- wound vac removed   4/5- extubated   4/6- abx D/C'd, precedex gtt started for delirium       Neuro  1. Acute delirium. Post-extubation, the patient has become acutely agitated. Precedex gtt started on 4/6 with good effect. Titrated off overnight but now needed again for continued delirium. QTc checked on 4/6, stable.   2. Acute pain  3. Sedation  Plan:  -- Fentanyl bumps for pain management 25-50mcg/1hr   -- Will continue precedex gtt today for delirium  -- Continue Seroquel 25mg BID and PRN Seroquel   -- PRN zyprexa as needed       CV  1. Hypotension, likely septic shock in the setting of necrotizing fascitis. Resolved   2. Hypertriglyceridemia, likely related to PCOS  Plan:  -- HD stable       Resp:  1. Acute respiratory failure, post-operatively secondary to acute illness. CXR during hospital course showed some vascular congestion after aggressive fluid resuscitation. Extubated on 4/5 to BiPAP and now NC.    Plan:  -- Continue on high flow, wean down O2 requirements as able   -- Continue duonebs scheduled       GI/Nutrition  1. No prior hx  2. Diarrhea. C.diff negative  3. Nutrition: tube feeds started 4/3 and ended on 4/5 after extubation, now on liquids   Plan:  -- Liquid diet       Renal  1. DONNA.  Baseline appears to be 0.6, creatinine increased to 2.47, now downtrending. May be secondary to contrast nephropathy, drugs (Vancomycin); UOP adequate so less likely pre-renal state. May be ATN from hypotension on admission, need to evaluate for AIN; no reason for crystal induced nephropathy. Resolving   2. Oliguria. UOP dropping off, resolved.   3. Hypernatremia, Na now 149. Likely in setting of aggressive diuresis   4. Hyponatremia. Typically occurs with severe necrotizing fascitis, resolved after NS boluses and IVFs, resolved   5. Hypocalcemia  6. Hypervolemia. Now up 3L down from +8 L this admission. Aggressive diuresis with lasix x2   Plan:  -- Follow creatinine and UOP  -- If sodium uptrend again, will run medications through D5      ID  1. Necrotizing fascitis of R groin now s/p debridement in the OR on 3/29 and 3/30. CT scan on admission shows extensive gas pattern in R groin suggestive of necrotizing fasciitis   1. Currently afebrile with no leukocytosis   2. Wound cultures with Prevotella disiens and light growth corynebacterium  3. Zosyn and daptomysin D/C'd on 4/6  2. 10,000-50,000 Candida albicans in the urine.   3. Heavy growth Candida albicans in sputum   Plan:  -- start oral fluconazole for candida in urine and sputum  -- Plan for wound vac placement at a later time (date is not set), waiting for improvement in skin breakdown/candida in the groin, surgery is following      Endocrine  1. Stress Hyperglycemia in the setting of type II DM, HgbA1c 9.6 on admission. Continued elevated blood sugars despite high SSI  2. PCOS. Previously on metformin and spironolactone   Plan:  -- add 5 units lantus QHS  -- High SSI  -- Keep BG <180 for optimal healing      Heme:  1. Acute blood loss anemia. Hgb 9.2  Plan:  -- Monitor hemoglobin.  -- Transfuse to keep > 7.0      MSK  1. Necrotizing fascitis, R leg and groin  Plan:  -- management plan per above       Skin  1. Redness of the face without apparent angioedema,  some swelling of eyelids- resolved   2. Candida around R groin  Plan:  -- wound care- wet to dry  -- continue miconazole        General cares:  DVT Prophylaxis: Heparin SQ  GI Prophylaxis: not indicated  Restraints: Restraints for medical healing needed: YES  Family update by me today: No  Current lines are required for patient management  Access:  L IJ 3/29      Rosario Ward PA-C  Pager: [phone]    Time Spent on this Encounter   Billing:  I spent 60 minutes bedside and on the inpatient unit today managing the critical care of Caro Landis in relation to the issues listed in this note.    Main Plans for Today   -- start oral fluconazole   -- D/C precedex  -- start PRN seroquel  -- D/C Q8 hour sodium checks     Interval History   Course reviewed. No acute events overnight. The patient remains delirious throughout the night but was able to wean off of precedex. She does answer questions but at times inappropriate. ROS cannot be performed given current state.     Physical Exam   Temp: 99  F (37.2  C) Temp src: Oral Temp  Min: 98.4  F (36.9  C)  Max: 99.4  F (37.4  C) BP: 160/85   Heart Rate: 59 Resp: 15 SpO2: 97 % O2 Device: Oxymizer cannula Oxygen Delivery: 10 LPM  Vitals:    04/05/17 0500 04/06/17 0500 04/07/17 0500   Weight: 124.5 kg (274 lb 7.6 oz) 115.9 kg (255 lb 8.2 oz) 118 kg (260 lb 2.3 oz)     I/O last 3 completed shifts:  In: 631.69 [I.V.:631.69]  Out: 1620 [Urine:1400; Stool:220]    GEN:  female, agitated on exam   EYES: PERRL, Anicteric sclera.   HEENT: Normocephalic, atraumatic, trachea midline  CV: RRR, no gallops, rubs, or murmurs  PULM/CHEST: CTAB, no wheezes or rhonchi  GI: normal bowel sounds, soft, non-tender, no rebound tenderness or guarding  : wong catheter in place, urine yellow and clear, edema of the labia  EXTREMITIES: peripheral edema in lower extremities, moving all extremities, peripheral pulses intact  NEURO: alert, oriented to self but not to place or time   SKIN: warm and  dry   Imaging personally reviewed: No new imaging   ECG- NSR    Medications     IV fluid REPLACEMENT ONLY       NaCl 10 mL/hr at 04/06/17 2129       fluconazole  200 mg Oral Daily     insulin glargine  5 Units Subcutaneous At Bedtime     QUEtiapine  25 mg Oral BID     miconazole   Topical BID     insulin aspart  1-12 Units Subcutaneous Q4H     ipratropium - albuterol 0.5 mg/2.5 mg/3 mL  3 mL Nebulization 4x daily     heparin  5,000 Units Subcutaneous Q8H       Data     Recent Labs  Lab 04/07/17  0600 04/06/17  2320 04/06/17  1625 04/06/17  1300 04/06/17  0530  04/05/17  0400 04/04/17  0430   WBC 7.9  --   --   --  8.3  --  7.8 9.3   HGB 9.2*  --   --   --  9.0*  --  8.8* 9.3*   MCV 87  --   --   --  87  --  87 87     --   --   --  387  --  356 339   * 149* 151*  --  152*  --  150* 149*   POTASSIUM 3.8  --   --  3.7 3.3*  < > 3.1* 3.7   CHLORIDE 118*  --   --   --  118*  --  117* 116*   CO2 24  --   --   --  24  --  24 21   BUN 25  --   --   --  24  --  23 19   CR 1.82*  --   --   --  1.89*  --  2.19* 1.92*   ANIONGAP 7  --   --   --  10  --  9 12   ROBERT 8.1*  --   --   --  8.1*  --  7.9* 7.9*   *  --   --   --  141*  --  165* 130*   ALBUMIN  --   --   --   --   --   --   --  1.8*   < > = values in this interval not displayed.  No results found for this or any previous visit (from the past 24 hour(s)).

## 2017-04-07 NOTE — PROGRESS NOTES
CLINICAL NUTRITION SERVICES - REASSESSMENT NOTE      Recommendations Ordered by Registered Dietitian (RD):   Powell Magic Cup at 10 am and Gelatein 20 SF Fruit Punch at 2 pm.   Future/Additional Recommendations:   Diet advancement per SLP- recommend add Mod-CHO when on solids.  Diabetic diet education prior to d/c as appropriate.   Malnutrition:   Patient does not meet criteria for malnutrition.       EVALUATION OF PROGRESS TOWARD GOALS   Diet: Full Liquid Diet- Nectar Thickened Liquids    Intake/Tolerance:    Pt ate 75% of meal this am and small amount last night    BGMs: 148-227 in the past 24 hrs (note new diagnosis of DM this admission)  -High SSI  -5 units Lantus at night added today    A1C: 10.4 (- trending up)    Stoolin mL in the past 24 hrs    Dosing Weight 118 kg (energy) and 63.6 kg (protein)     ASSESSED NUTRITION NEEDS (Energy needs re-assessed):  Estimated Energy Needs: 5155-5769 kcals (14-17 Kcal/Kg)  Justification: obese  Estimated Protein Needs:  grams protein (1.5-2 g pro/Kg)  Justification: post-op, wound healing, hypercatabolism with acute illness and obesity guidelines    MALNUTRITION:  % Weight Loss: None noted  % Intake: Decreased intake does not meet criteria  Subcutaneous Fat Loss: None observed  Muscle Loss: None observed  Fluid Retention: None noted     Malnutrition Diagnosis: Patient does not meet criteria for malnutrition.     NEW FINDINGS:   : Pt extubated, TF off, Propofol stopped, Prostat d/c'd, OG removed  : Diet advanced to full liquids- nectar thick per SLP    Pt was fairly groggy and not a detailed historian at this this. Pt reports being very hungry and would like extra supplements. At home, she reports that she was eating well with meals TID. She likes juice, yogurt, and cream of wheat. Her mother reported that she was not feeling well prior to admit and was eating little for x3 days prior to admit.    Previous Goals:   Promote with Fiber at 15 mL/hr +  ProStat + Propofol will meet % needs  Evaluation: Not met    Previous Nutrition Diagnosis:   Inadequate protein-energy intake related to NPO, TF to start today as evidenced by meeting 0% protein and 77% energy needs via Propofol   Evaluation: Improving    CURRENT NUTRITION DIAGNOSIS  Inadequate oral intake related to moderate dysphagia and restrictive diet advancement as evidenced by intake fair at best and nectar-thickened liquids likely meeting <50% energy and PRO needs.    INTERVENTIONS  Recommendations / Nutrition Prescription  Continue full liquids- nectar thickened.  Diet advancement per SLP- recommend Mod-CHO when on solids.  Berry Magic Cup at 10 am.  Gelatein 20 SF Fruit Punch at 2 pm.  Diabetic diet education prior to d/c as appropriate.    Implementation  Medical Food Supplement- Magic Cup and Gelatein as above  Collaboration and Referral of Nutrition care- discussed POC with team during rounds  Nutrition Education- discussed the use of supplements to optimize oral intake    Goals  Diet to be advanced to solids and pt to consume >75% of meals TID and supplements in the next 3-5 days.    MONITORING AND EVALUATION:  Progress towards goals will be monitored and evaluated per protocol and Practice Guidelines      Brooke Leger, Dietetic Intern

## 2017-04-07 NOTE — PLAN OF CARE
Problem: Goal Outcome Summary  Goal: Goal Outcome Summary  Outcome: Improving  Patient is alert disoriented to place and time, calm amd cooperative this am with precedex off, became extremely agitated and restless, trying to climb out of bed and was unable to redirect, Rosario GRAHAM notified and an additional dose of serequel given @ 1108 see mar, precedex gtt restarted and is currently off, mom Hilda is at bedside, wong removed and new wong placed without difficulty

## 2017-04-07 NOTE — PLAN OF CARE
Problem: Individualization  Goal: Patient Preferences  Outcome: No Change  Pt opens eyes to voice, follows commands.  Was anxious with family and friends at bedside.  However, with Precedex drip pt able to rest between cares.  Medicated X1 with PRN Fentanyl.  BP cuff moved to right arm.  Left IJ, each lumen flushes.  Liquid stool seems to have slowed some.  Menses continues.  Restraints remain on and sitter at bedside.  No issues with swallowing nectar thick lig's over night.

## 2017-04-07 NOTE — PROGRESS NOTES
"General Surgery Progress Note    Subjective: Pt confused but able to follow commands and answer simple questions. Impulsive. Extubated yesterday. Passed swallow eval.     Objective: /85  Pulse 102  Temp 99  F (37.2  C) (Oral)  Resp 15  Ht 5' 8\" (1.727 m)  Wt 260 lb 2.3 oz (118 kg)  SpO2 97%  BMI 39.55 kg/m2  Gen: opens eyes to voice  CV: RRR  Pulm: nonlabored breathing  Abd: soft, right groin - wound with healthy granulation tissue at base. Skin on inferior aspect with sloughing. Superior skin improved.   Ext: WWP    Assessment/Plan:   Caro Landis  is a 36 year old female s/p groin excisional debridement for necrotizing soft tissue infection. Improving. Continues on precedex. Blood glucose is elevated. Cultures showing prevotella, fusobacterium, diptheroids, cornebacterium - no susceptibility testing performed, off abx as of 4.6. Sputum and urine with candida.   - continue wet to dry dressing to right groin, inferior skin needs to be healed prior to consideration of wound vac placement.   - appreciate Intensivist cares    Mary Beth Neal MD  [facility]  [phone]              "

## 2017-04-07 NOTE — PROGRESS NOTES
Pt remains on the HFNC 30%, and 20 lpm, all the neb tx were given as ordered, BBS clear, SpO2 97%, will continue to monitor the pt.     RT Kristie.

## 2017-04-07 NOTE — PLAN OF CARE
Problem: Goal Outcome Summary  Goal: Goal Outcome Summary  Outcome: No Change  Patient calm and cooperative with precedex gtt @ 0.3 mcg/kg/min, RAAS -2 , gtt titrated to goal RAAS of 0 to-1, oriented to self only, CAM positive, Mom Hilda at bedside, updated on POC, afebrile, o2 high flow @ 30% and 20 liters flow, Dr. Maxwell notified of EKG result QT normal, sodium 151

## 2017-04-08 ENCOUNTER — APPOINTMENT (OUTPATIENT)
Dept: SPEECH THERAPY | Facility: CLINIC | Age: 37
DRG: 853 | End: 2017-04-08
Payer: COMMERCIAL

## 2017-04-08 ENCOUNTER — APPOINTMENT (OUTPATIENT)
Dept: PHYSICAL THERAPY | Facility: CLINIC | Age: 37
DRG: 853 | End: 2017-04-08
Attending: PHYSICIAN ASSISTANT
Payer: COMMERCIAL

## 2017-04-08 PROBLEM — E11.9 TYPE 2 DIABETES MELLITUS WITHOUT COMPLICATION (H): Status: ACTIVE | Noted: 2017-04-08

## 2017-04-08 LAB
ANION GAP SERPL CALCULATED.3IONS-SCNC: 9 MMOL/L (ref 3–14)
BUN SERPL-MCNC: 25 MG/DL (ref 7–30)
C DIFF TOX B STL QL: NORMAL
CALCIUM SERPL-MCNC: 8.1 MG/DL (ref 8.5–10.1)
CHLORIDE SERPL-SCNC: 115 MMOL/L (ref 94–109)
CO2 SERPL-SCNC: 23 MMOL/L (ref 20–32)
CREAT SERPL-MCNC: 1.72 MG/DL (ref 0.52–1.04)
ERYTHROCYTE [DISTWIDTH] IN BLOOD BY AUTOMATED COUNT: 13.8 % (ref 10–15)
GFR SERPL CREATININE-BSD FRML MDRD: 33 ML/MIN/1.7M2
GLUCOSE BLDC GLUCOMTR-MCNC: 141 MG/DL (ref 70–99)
GLUCOSE BLDC GLUCOMTR-MCNC: 153 MG/DL (ref 70–99)
GLUCOSE BLDC GLUCOMTR-MCNC: 164 MG/DL (ref 70–99)
GLUCOSE BLDC GLUCOMTR-MCNC: 183 MG/DL (ref 70–99)
GLUCOSE BLDC GLUCOMTR-MCNC: 184 MG/DL (ref 70–99)
GLUCOSE BLDC GLUCOMTR-MCNC: 192 MG/DL (ref 70–99)
GLUCOSE SERPL-MCNC: 161 MG/DL (ref 70–99)
HCT VFR BLD AUTO: 28.7 % (ref 35–47)
HGB BLD-MCNC: 9.3 G/DL (ref 11.7–15.7)
INTERPRETATION ECG - MUSE: NORMAL
MAGNESIUM SERPL-MCNC: 2.4 MG/DL (ref 1.6–2.3)
MCH RBC QN AUTO: 28.3 PG (ref 26.5–33)
MCHC RBC AUTO-ENTMCNC: 32.4 G/DL (ref 31.5–36.5)
MCV RBC AUTO: 87 FL (ref 78–100)
PHOSPHATE SERPL-MCNC: 2.8 MG/DL (ref 2.5–4.5)
PLATELET # BLD AUTO: 423 10E9/L (ref 150–450)
POTASSIUM SERPL-SCNC: 3.4 MMOL/L (ref 3.4–5.3)
RBC # BLD AUTO: 3.29 10E12/L (ref 3.8–5.2)
SODIUM SERPL-SCNC: 147 MMOL/L (ref 133–144)
SPECIMEN SOURCE: NORMAL
WBC # BLD AUTO: 11.2 10E9/L (ref 4–11)

## 2017-04-08 PROCEDURE — 40000193 ZZH STATISTIC PT WARD VISIT: Performed by: PHYSICAL THERAPIST

## 2017-04-08 PROCEDURE — 94640 AIRWAY INHALATION TREATMENT: CPT

## 2017-04-08 PROCEDURE — 00000146 ZZHCL STATISTIC GLUCOSE BY METER IP

## 2017-04-08 PROCEDURE — 94640 AIRWAY INHALATION TREATMENT: CPT | Mod: 76

## 2017-04-08 PROCEDURE — 99222 1ST HOSP IP/OBS MODERATE 55: CPT | Performed by: INTERNAL MEDICINE

## 2017-04-08 PROCEDURE — 40000225 ZZH STATISTIC SLP WARD VISIT: Performed by: SPEECH-LANGUAGE PATHOLOGIST

## 2017-04-08 PROCEDURE — 87493 C DIFF AMPLIFIED PROBE: CPT | Performed by: PHYSICIAN ASSISTANT

## 2017-04-08 PROCEDURE — 25000132 ZZH RX MED GY IP 250 OP 250 PS 637: Performed by: PSYCHIATRY & NEUROLOGY

## 2017-04-08 PROCEDURE — 83735 ASSAY OF MAGNESIUM: CPT | Performed by: PHYSICIAN ASSISTANT

## 2017-04-08 PROCEDURE — 80048 BASIC METABOLIC PNL TOTAL CA: CPT | Performed by: PHYSICIAN ASSISTANT

## 2017-04-08 PROCEDURE — 99207 ZZC CDG-EXAM COMPONENT: MEETS DETAILED - DOWN CODED: CPT | Performed by: PSYCHIATRY & NEUROLOGY

## 2017-04-08 PROCEDURE — 84100 ASSAY OF PHOSPHORUS: CPT | Performed by: PHYSICIAN ASSISTANT

## 2017-04-08 PROCEDURE — 99207 ZZC MOONLIGHTING INDICATOR: CPT | Performed by: PHYSICIAN ASSISTANT

## 2017-04-08 PROCEDURE — 97161 PT EVAL LOW COMPLEX 20 MIN: CPT | Mod: GP | Performed by: PHYSICAL THERAPIST

## 2017-04-08 PROCEDURE — E2402 NEG PRESS WOUND THERAPY PUMP: HCPCS

## 2017-04-08 PROCEDURE — 99207 ZZC CONSULT E&M CHANGED TO INITIAL LEVEL: CPT | Performed by: INTERNAL MEDICINE

## 2017-04-08 PROCEDURE — 25000132 ZZH RX MED GY IP 250 OP 250 PS 637: Performed by: INTERNAL MEDICINE

## 2017-04-08 PROCEDURE — 25000132 ZZH RX MED GY IP 250 OP 250 PS 637: Performed by: PHYSICIAN ASSISTANT

## 2017-04-08 PROCEDURE — 36415 COLL VENOUS BLD VENIPUNCTURE: CPT | Performed by: PHYSICIAN ASSISTANT

## 2017-04-08 PROCEDURE — 25000128 H RX IP 250 OP 636: Performed by: PHYSICIAN ASSISTANT

## 2017-04-08 PROCEDURE — 92526 ORAL FUNCTION THERAPY: CPT | Mod: GN | Performed by: SPEECH-LANGUAGE PATHOLOGIST

## 2017-04-08 PROCEDURE — 99207 ZZC APP CREDIT; MD BILLING SHARED VISIT: CPT | Performed by: PHYSICIAN ASSISTANT

## 2017-04-08 PROCEDURE — 97530 THERAPEUTIC ACTIVITIES: CPT | Mod: GP | Performed by: PHYSICAL THERAPIST

## 2017-04-08 PROCEDURE — 25000125 ZZHC RX 250: Performed by: SURGERY

## 2017-04-08 PROCEDURE — 99221 1ST HOSP IP/OBS SF/LOW 40: CPT | Performed by: PSYCHIATRY & NEUROLOGY

## 2017-04-08 PROCEDURE — 25000131 ZZH RX MED GY IP 250 OP 636 PS 637: Performed by: INTERNAL MEDICINE

## 2017-04-08 PROCEDURE — 40000275 ZZH STATISTIC RCP TIME EA 10 MIN

## 2017-04-08 PROCEDURE — 25000131 ZZH RX MED GY IP 250 OP 636 PS 637: Performed by: PHYSICIAN ASSISTANT

## 2017-04-08 PROCEDURE — 25000125 ZZHC RX 250: Performed by: PHYSICIAN ASSISTANT

## 2017-04-08 PROCEDURE — 85027 COMPLETE CBC AUTOMATED: CPT | Performed by: PHYSICIAN ASSISTANT

## 2017-04-08 PROCEDURE — 12000007 ZZH R&B INTERMEDIATE

## 2017-04-08 RX ORDER — NICOTINE POLACRILEX 4 MG
15-30 LOZENGE BUCCAL
Status: DISCONTINUED | OUTPATIENT
Start: 2017-04-08 | End: 2017-04-13 | Stop reason: HOSPADM

## 2017-04-08 RX ORDER — DEXTROSE MONOHYDRATE 25 G/50ML
25-50 INJECTION, SOLUTION INTRAVENOUS
Status: DISCONTINUED | OUTPATIENT
Start: 2017-04-08 | End: 2017-04-13 | Stop reason: HOSPADM

## 2017-04-08 RX ORDER — HYDRALAZINE HYDROCHLORIDE 20 MG/ML
10 INJECTION INTRAMUSCULAR; INTRAVENOUS EVERY 4 HOURS PRN
Status: DISCONTINUED | OUTPATIENT
Start: 2017-04-08 | End: 2017-04-08

## 2017-04-08 RX ORDER — HYDRALAZINE HYDROCHLORIDE 20 MG/ML
10 INJECTION INTRAMUSCULAR; INTRAVENOUS EVERY 4 HOURS PRN
Status: DISCONTINUED | OUTPATIENT
Start: 2017-04-08 | End: 2017-04-13 | Stop reason: HOSPADM

## 2017-04-08 RX ORDER — QUETIAPINE FUMARATE 25 MG/1
25 TABLET, FILM COATED ORAL 4 TIMES DAILY PRN
Status: DISCONTINUED | OUTPATIENT
Start: 2017-04-08 | End: 2017-04-13 | Stop reason: HOSPADM

## 2017-04-08 RX ADMIN — IPRATROPIUM BROMIDE AND ALBUTEROL SULFATE 3 ML: .5; 3 SOLUTION RESPIRATORY (INHALATION) at 16:33

## 2017-04-08 RX ADMIN — MICONAZOLE NITRATE: 2 POWDER TOPICAL at 09:58

## 2017-04-08 RX ADMIN — INSULIN GLARGINE 5 UNITS: 100 INJECTION, SOLUTION SUBCUTANEOUS at 22:51

## 2017-04-08 RX ADMIN — HEPARIN SODIUM 5000 UNITS: 10000 INJECTION, SOLUTION INTRAVENOUS; SUBCUTANEOUS at 22:49

## 2017-04-08 RX ADMIN — ACETAMINOPHEN 650 MG: 160 SUSPENSION ORAL at 00:12

## 2017-04-08 RX ADMIN — HEPARIN SODIUM 5000 UNITS: 10000 INJECTION, SOLUTION INTRAVENOUS; SUBCUTANEOUS at 13:49

## 2017-04-08 RX ADMIN — IPRATROPIUM BROMIDE AND ALBUTEROL SULFATE 3 ML: .5; 3 SOLUTION RESPIRATORY (INHALATION) at 20:36

## 2017-04-08 RX ADMIN — INSULIN ASPART 1 UNITS: 100 INJECTION, SOLUTION INTRAVENOUS; SUBCUTANEOUS at 04:28

## 2017-04-08 RX ADMIN — QUETIAPINE FUMARATE 25 MG: 25 TABLET, FILM COATED ORAL at 22:52

## 2017-04-08 RX ADMIN — INSULIN ASPART 3 UNITS: 100 INJECTION, SOLUTION INTRAVENOUS; SUBCUTANEOUS at 13:47

## 2017-04-08 RX ADMIN — INSULIN ASPART 2 UNITS: 100 INJECTION, SOLUTION INTRAVENOUS; SUBCUTANEOUS at 00:17

## 2017-04-08 RX ADMIN — IPRATROPIUM BROMIDE AND ALBUTEROL SULFATE 3 ML: .5; 3 SOLUTION RESPIRATORY (INHALATION) at 11:18

## 2017-04-08 RX ADMIN — HEPARIN SODIUM 5000 UNITS: 10000 INJECTION, SOLUTION INTRAVENOUS; SUBCUTANEOUS at 06:49

## 2017-04-08 RX ADMIN — QUETIAPINE FUMARATE 25 MG: 25 TABLET, FILM COATED ORAL at 09:58

## 2017-04-08 RX ADMIN — IPRATROPIUM BROMIDE AND ALBUTEROL SULFATE 3 ML: .5; 3 SOLUTION RESPIRATORY (INHALATION) at 07:26

## 2017-04-08 RX ADMIN — HYDRALAZINE HYDROCHLORIDE 10 MG: 20 INJECTION INTRAMUSCULAR; INTRAVENOUS at 15:58

## 2017-04-08 RX ADMIN — FLUCONAZOLE 200 MG: 200 TABLET ORAL at 09:58

## 2017-04-08 RX ADMIN — INSULIN ASPART 1 UNITS: 100 INJECTION, SOLUTION INTRAVENOUS; SUBCUTANEOUS at 09:44

## 2017-04-08 RX ADMIN — INSULIN ASPART 1 UNITS: 100 INJECTION, SOLUTION INTRAVENOUS; SUBCUTANEOUS at 17:53

## 2017-04-08 RX ADMIN — MICONAZOLE NITRATE: 2 POWDER TOPICAL at 22:55

## 2017-04-08 NOTE — PLAN OF CARE
Problem: Goal Outcome Summary  Goal: Goal Outcome Summary  Outcome: Improving  Patient has been calm and cooperative with precedex off, mom @ besides, RAAS 0, oriented to self and place, follows commands, tolerating 02 decreased to oximizer 6 liters, afebrile

## 2017-04-08 NOTE — PLAN OF CARE
Problem: Goal Outcome Summary  Goal: Goal Outcome Summary  Outcome: Improving  Mental status improving. Disoriented to year, otherwise alert and oriented x3. Generalized weakness t/o. Tolerating 3L NC. Lungs clear/dim. BP stable. Urine output adequate. Loose stool per rectal tube. Rt groin w/kerlex packing. Afebrile. Na+ decreasing.

## 2017-04-08 NOTE — PLAN OF CARE
Problem: Goal Outcome Summary  Goal: Goal Outcome Summary  PT - Eval completed and treatment initiated. Pt is a 35 y/o female admitted with necrotizing fasciitis in her groin, s/p 2 surgeries 3/29 and 3/30, septic shock and delirium. Pt is currently cooperative but confused, thinks it is November and that she is at [facility], has sitter. Transfers to edge of bed with mod assist 2, up to chair with mod assist 2-3, weak but moving quite well considering how deconditioned she is. Did not tolerate sitting in chair for more than a couple minutes due to pain from groin incision and also rectal tube, back to bed. Anticipate she will need rehab at discharge, recommend ARU.

## 2017-04-08 NOTE — PROGRESS NOTES
Surgery  Pt more alert/appropriate than yesterday per report  No cellulitis changes around R groin, wound inspected by Dr. Neal yesterday.  Plan:  ADAT, start PT.  David Carlin MD  General Surgery, Office [phone]

## 2017-04-08 NOTE — PROGRESS NOTES
" 04/08/17 1224   Quick Adds   Type of Visit Initial PT Evaluation   Living Environment   Lives With (room mate)   Living Arrangements house   Living Environment Comment Pt is confused, unable to answered detailed questions about living environment   Self-Care   Usual Activity Tolerance good   Current Activity Tolerance poor   Regular Exercise no   Equipment Currently Used at Home none   Functional Level Prior   Ambulation 0-->independent   Transferring 0-->independent   Toileting 0-->independent   Bathing 0-->independent   Dressing 0-->independent   Eating 0-->independent   Communication 0-->understands/communicates without difficulty   Swallowing 0-->swallows foods/liquids without difficulty   Cognition 0 - no cognition issues reported   Fall history within last six months no   Which of the above functional risks had a recent onset or change? none   General Information   Onset of Illness/Injury or Date of Surgery - Date 03/29/17   Referring Physician Rosario GRAHAM   Patient/Family Goals Statement return home when able   Pertinent History of Current Problem (include personal factors and/or comorbidities that impact the POC) Pt admitted 3/29/17 with necrotizing fasciitis in groin, s/p surgery and wound VAC 3/29 and 3/30. Septic shock, delerium. Works as  provider, has PMH of PCOS and hyperglycemia.   Precautions/Limitations fall precautions  (enteric precautions)   Weight-Bearing Status - LUE full weight-bearing   Weight-Bearing Status - RUE full weight-bearing   Weight-Bearing Status - LLE full weight-bearing   Weight-Bearing Status - RLE full weight-bearing   Cognitive Status Examination   Orientation person  (\"[facility]\")   Level of Consciousness alert   Follows Commands and Answers Questions 75% of the time   Personal Safety and Judgment impaired;at risk behaviors demonstrated   Memory impaired   Cognitive Comment Pt is confused as to month, knew year and hospital but thought [facility]. " "Delerium   Pain Assessment   Patient Currently in Pain Yes, see Vital Sign flowsheet   Integumentary/Edema   Integumentary/Edema Comments wound in groin   Posture    Posture Forward head position   Range of Motion (ROM)   ROM Comment LE ROM decreased - stiffness   Strength   Strength Comments LE strength not formally tested d/t pain/wound in groin, but noted greater than antigravity strength dm LE's in all planes, grossly 3/5   Bed Mobility   Bed Mobility Comments supine to/from sit with mod assist 2   Transfer Skills   Transfer Comments sit to/from stand with mod assist 2-3   Gait   Gait Comments unable   Balance   Balance Comments sat EOB with supervision, standing balance poor d/t weakness and pain   General Therapy Interventions   Planned Therapy Interventions balance training;gait training;strengthening;transfer training   Clinical Impression   Criteria for Skilled Therapeutic Intervention yes, treatment indicated   PT Diagnosis Impaired functional mobility   Influenced by the following impairments decreased balance, general deconditioning, decreased strength   Functional limitations due to impairments fall risk, inability to ambulate, decreased independence.   Clinical Presentation Stable/Uncomplicated   Clinical Presentation Rationale Pt presents with necrotizing fasciitis s/p 2 surgeries, septic shock and delerium.   Clinical Decision Making (Complexity) Low complexity   Therapy Frequency` daily   Predicted Duration of Therapy Intervention (days/wks) 5-7 days   Anticipated Equipment Needs at Discharge (defer to rehab unit)   Anticipated Discharge Disposition Acute Rehabilitation Facility   Risk & Benefits of therapy have been explained Yes   Patient, Family & other staff in agreement with plan of care Yes   [facility] AM-PAC TM \"6 Clicks\"   2016, Trustees of [facility], under license to [employer].  All rights reserved.   6 Clicks Short Forms Basic Mobility Inpatient Short Form   [facility]" "[facility]  \"6 Clicks\" V.2 Basic Mobility Inpatient Short Form   1. Turning from your back to your side while in a flat bed without using bedrails? 2 - A Lot   2. Moving from lying on your back to sitting on the side of a flat bed without using bedrails? 2 - A Lot   3. Moving to and from a bed to a chair (including a wheelchair)? 2 - A Lot   4. Standing up from a chair using your arms (e.g., wheelchair, or bedside chair)? 2 - A Lot   5. To walk in hospital room? 2 - A Lot   6. Climbing 3-5 steps with a railing? 1 - Total   Basic Mobility Raw Score (Score out of 24.Lower scores equate to lower levels of function) 11   Total Evaluation Time   Total Evaluation Time (Minutes) 15     "

## 2017-04-08 NOTE — H&P
CHIEF COMPLAINT:  At the time of admission was necrotizing soft tissue infection of right groin, secondary consult for possible delirium.        HISTORY OF PRESENT ILLNESS:  Caro Landis is a 36-year-old   female who was interviewed in the ICU.  The patient was cooperative.  On leading questions, admits to some depression.  She states that her depression has started since 03/2017 when she had surgery, possibly on 03/29/2017 for an abscess on her right groin.  She says ever since then she has been feeling kind of down.  Depression is characterized by some low mood and anhedonia, also reports some anxiety, but more significant is the fact that she has been disoriented recently.  To me she was able to tell that she was at [facility] and she knew the month and the year.  She feels that she has had these mood issues secondary to the medication and the surgery that she underwent.  On leading questions, denies any previous history prior to the surgery.  Denies any   bipolar symptoms.  Denies any other psychosis.  Was put on Seroquel p.r.n. and now is on it scheduled and also Zyprexa p.r.n., which seemed to have resolved most of it.  She does have some electrolyte imbalances with sodium and chloride that need to be fixed for her to be less delirious, though she is improving.  Denies any substance abuse issues.      PAST PSYCHIATRIC HISTORY:  Denies any previous history of any mental illness as far as I can ascertain from the patient.  She claims that her symptoms started after the surgery.  Has never been on any psych meds in the past, never attempted suicide.      CHEMICAL DEPENDENCY:  The patient denies anything.      PAST MEDICAL HISTORY:  The patient has diabetes and the recent abscess.      FAMILY HISTORY:  The patient denies anything.      PERSONAL AND SOCIAL HISTORY:  , has no kids.  Works as a  provider.      REVIEW OF SYSTEMS:   CONSTITUTIONAL:  Tired.   EYES:  Negative.    ENT:  Negative.   RESPIRATORY:  Negative.   CARDIOVASCULAR:  Negative.   GASTROINTESTINAL:  Negative.   GENITOURINARY:  Negative.   MUSCULOSKELETAL:  Negative.   INTEGUMENTARY:  Recent abscess.   NEUROLOGICAL:  Negative.   PSYCHIATRIC:  Blunted but reacting.   ENDOCRINE:  Negative.   HEMATOLOGIC:  Negative.      ALLERGIES:  Negative.      MENTAL STATUS:  General appearance:  Dressed in scrubs, fair eye contact.  Speech:  Slow, low volume.  Thought process:  Logical, goal directed.  Thought content:  No active homicidal, suicidal ideations, no loosening association or psychosis.  Judgment and insight is partial, oriented briefly to person, place and time.  Memory:  Some recent memory loss.  Attention:  Aroused, not fully sustained.  Fund of knowledge:  Average.  Mood and affect is blunted but reactive.  Muscle strength, tone, gait:  Lying in bed.      IMPRESSION:   Axis I:  Delirium, not otherwise specified.   Axis II:  Deferred.   Axis III:  See medical notes.   Axis IV:  Social stressors, medical issues.   Axis V:  Global Assessment of Functioning 39.      PLAN:  Continue the Seroquel 25 mg p.o. b.i.d. until her electrolytes are under control.  We will also add some Seroquel p.r.n. in addition to that.  We will advise that patient not be given narcotic medication while she is having the delirium as it may precipitate worsening of delirium.  Please call Psychiatry for followup as needed.         PAUL MCDONNELL MD             D: 2017 08:57   T: 2017 09:25   MT: AYDEN      Name:     ZAYDA SQUIRES   MRN:      [id number]        Account:      [id number]   :      1980           Admitted:     [id number]      Document: [id number]

## 2017-04-08 NOTE — CONSULTS
[facility]    Hospitalist Consultation    Date of Admission:  3/29/2017    Assessment & Plan   Caro Landis is a 36 year old female with past medical history of PCOS and hyperlipidemia who was admitted on 3/29/2017 for necrotizing fasciitis of the right groin.  She underwent debridement on March 29 and 30 and wound vac placement on 4/3 and removal 4/4.  She was treated in the ICU for septic shock.  Post operatively she was delirious and psychiatry was consulted.    Summary:    Necrotizing fascitis of R groin now s/p debridement in the OR on 3/29 and 3/30. CT scan on admission shows extensive gas pattern in R groin suggestive of necrotizing fasciitis    -General Surgery Following   -Wound ostomy following   -Zosyn and daptomysin D/C'd on 4/6   -Wound cultures with Prevotella disiens and light growth corynebacterium   -Afebrile, recheck WBC in am   -rectal tube in place for ongoing diarrhea    -C. Diff negative on 4/4   -wong catheter in place to assist with wound healing    Delirium   -Previously required precedex infusion, weaned 4/6 at 15:15   -psychiatry assistance appreciated   -Seroquel 25 mg BID and prn    -QT interval was normal on 4/6   -prn Zyprexa    Candida albicans in sputum and urine   -On Fluconazole, will discuss with ID    Right groin candidiasis   -continue miconazole powder and wound cares    Acute blood loss anemia   -Hgb 9.0, stable   -transfuse to keep greater than 7    Stress Hyperglycemia in the setting of type II DM   -hga1c on admission 9.6   -high SSI and Lantus 5 units   -goal BG<180 for optimal healing    PCOS   -has previously been on metformin and spironolactone but was not taking PTA    Hypertriglyceridemia, likely related to PCOS   -no PTA treatment listed    Elevated BP readings with no history of HTN   -now normalized,  Continue to follow, ? Related to agitation or pain   -prn hydralazine ordered for SBP >180 and DBP >100    Acute kidney injury    - Creatinine  peaked to 2.47, now 1.72    Tobacco Abuse - smoked 1/2 ppd PTA, ready to quit, no cravings, denies need for nicotine replacement  Hypotension, presumed to be due to septic shock - resolved  Acute respiratory failure, post-operatively secondary to acute illness, extubated 4/5 - resolved    Hypernatremia, Hyponatremia - resolved  Redness of the face without apparent angioedema, some swelling of eyelids- resolved       DVT Prophylaxis: Heparin SQ  Diet: Soft Diet with Thin liquids, magic cup supplements  Code Status: Full Code    Disposition: Per primary team, PT, OT and SW consulted    Janice Gould PA-C  Pager [phone]     This patient was discussed with Dr. Fenton of the Hospitalist Service who agrees with current plans as outlined above.     Reason for Consult   Reason for consult: I was asked by Val to evaluate this patient for diabetes, PCOS and post operative management following treatment for necrotizing fasciitis.    Primary Care Physician   [facility]    Chief Complaint   DM, necrotizing fasciitis    History is obtained from the patient, chart review, nurse and outside record review    History of Present Illness   Caro Landis is a 36 year old female with pmh of PCOS and hypertriglyceridemia who was admitted for treatment of necrotizing fascitis of the right groin. CT groin shows diffuse gas pattern in her R groin suggestive of necrotizing fasciitis. She underwent surgical debridement on March 29 and 30th, 2017. She was treated in the intensive care unit for acute respiratory failure, acute kidney injury and ongoing delirium. She underwent wound vac placement on April 3, 2017 and removal on April 4, 2017. She was extubated on April 5, 2016. Antibiotics were discontinued on April 6. She remains on fluconazole for candida in her urine and sputum. Since that time she had had ongoing delirium and required restraints, a sitter and IV precedex.  Today her mentation is  improved. She is still impulsive but does not appear delirious. She is tolerating oral intake. Her pain is controlled.  She states her main complaint is ongoing diarrhea and weakness. She is requiring assistance with feeding due to weakness.    She has a known history of PCOS. She has been previously treated with metformin and aldactone however she has not been for some time. She had an A1c of 9.8 on admission. She has had improved blood glucose control with Lantus 5 mg and high dose sliding scale insulin.    She is hopeful to return home. She is a single female and lives with a roommate in a home. She states that everything that she needs is on the main floor other than the laundry. Her mother is actively present in her care currently.    She did smoke 1/2 pack per day prior to admission but is interested in quitting. She is not currently having any nicotine cravings.    A wong catheter remains in place.    Past Medical History    I have reviewed this patient's medical history and updated it with pertinent information if needed.   Past Medical History:   Diagnosis Date     Polycystic disease, ovaries      Type 2 diabetes mellitus without complication (H) 4/8/2017       Past Surgical History   I have reviewed this patient's surgical history and updated it with pertinent information if needed.  Past Surgical History:   Procedure Laterality Date     APPENDECTOMY       APPLY WOUND VAC N/A 4/3/2017    Procedure: APPLY WOUND VAC;  Surgeon: Mary Beth Neal MD;  Location: [facility] OR     EXCISE LESION GROIN Right 3/29/2017    Procedure: EXCISE LESION GROIN;  Surgeon: Mary Beth Neal MD;  Location: [facility] OR     EXPLORE GROIN Right 3/30/2017    Procedure: EXPLORE GROIN;  Surgeon: Mary Beth Neal MD;  Location: [facility] OR     IRRIGATION AND DEBRIDEMENT GROIN N/A 3/30/2017    Procedure: IRRIGATION AND DEBRIDEMENT GROIN;  Surgeon: Mary Beth Neal MD;  Location: [facility] OR     IRRIGATION AND DEBRIDEMENT GROIN Right 4/3/2017    Procedure:  IRRIGATION AND DEBRIDEMENT GROIN;  Surgeon: Mary Beth Neal MD;  Location:  OR       Prior to Admission Medications   Prior to Admission Medications   Prescriptions Last Dose Informant Patient Reported? Taking?   Acetaminophen (TYLENOL PO) prn Self Yes Yes   Sig: Take 325 mg by mouth every 6 hours as needed for mild pain or fever   Naproxen Sodium (ALEVE PO) Past Week at Unknown time Self Yes Yes   Sig: Take 220 mg by mouth 2 times daily as needed for moderate pain      Facility-Administered Medications: None     Allergies   Allergies   Allergen Reactions     Cats      Shellfish-Derived Products        Social History   I have reviewed this patient's social history and updated it with pertinent information if needed. Caro Landis  reports that she has been smoking.  She does not have any smokeless tobacco history on file. Single lives with roommate in a house, smoked 1/2 ppd prior to admission    Family History   I have reviewed this patient's family history and updated it with pertinent information if needed.   History reviewed. No pertinent family history.    Review of Systems   The 10 point Review of Systems is negative other than noted in the HPI or here. +diarrhea    Physical Exam   Temp: 98.2  F (36.8  C) Temp src: Oral BP: 144/83   Heart Rate: 73 Resp: 20 SpO2: 95 % O2 Device: Nasal cannula Oxygen Delivery: 3 LPM  Vital Signs with Ranges  Temp:  [98.2  F (36.8  C)-99.2  F (37.3  C)] 98.2  F (36.8  C)  Heart Rate:  [52-82] 73  Resp:  [10-31] 20  BP: (127-170)/() 144/83  SpO2:  [91 %-100 %] 95 %  262 lbs 5.56 oz    Constitutional: Alert and oriented x3, no acute distress  Eyes: PERRL, EOMs intact,  HEENT: patent nares, MMM, supple neck  Respiratory: clear to auscultation, decreased at baseline  Cardiovascular: regular rate and rhythm, no murmurs  GI: obese, soft, non tender, non distended, bowel sounds present  Lymph/Hematologic: no evidence of jaundice or bruising  Genitourinary: wong cathter and  rectal tube in place  Skin: warm and dry, no rashes, right groin with Kerlix in place  Musculoskeletal: able to move all extremities, generalized weakness noted  Neurologic: no focal neurologic deficits, gait not tested  Psychiatric: affect normal, answers questions appropriately    Data   -Data reviewed today: All pertinent laboratory and imaging results from this encounter were reviewed. I personally reviewed the EKG tracing showing NSR, ,  ms.    Recent Labs  Lab 04/08/17  0435 04/07/17  0600 04/06/17  2320  04/06/17  1300 04/06/17  0530  04/04/17  0430   WBC 11.2* 7.9  --   --   --  8.3  < > 9.3   HGB 9.3* 9.2*  --   --   --  9.0*  < > 9.3*   MCV 87 87  --   --   --  87  < > 87    384  --   --   --  387  < > 339   * 149* 149*  < >  --  152*  < > 149*   POTASSIUM 3.4 3.8  --   --  3.7 3.3*  < > 3.7   CHLORIDE 115* 118*  --   --   --  118*  < > 116*   CO2 23 24  --   --   --  24  < > 21   BUN 25 25  --   --   --  24  < > 19   CR 1.72* 1.82*  --   --   --  1.89*  < > 1.92*   ANIONGAP 9 7  --   --   --  10  < > 12   ROBERT 8.1* 8.1*  --   --   --  8.1*  < > 7.9*   * 202*  --   --   --  141*  < > 130*   ALBUMIN  --   --   --   --   --   --   --  1.8*   < > = values in this interval not displayed.    Imaging:  No results found for this or any previous visit (from the past 24 hour(s)).

## 2017-04-09 ENCOUNTER — APPOINTMENT (OUTPATIENT)
Dept: ULTRASOUND IMAGING | Facility: CLINIC | Age: 37
DRG: 853 | End: 2017-04-09
Attending: INTERNAL MEDICINE
Payer: COMMERCIAL

## 2017-04-09 ENCOUNTER — APPOINTMENT (OUTPATIENT)
Dept: PHYSICAL THERAPY | Facility: CLINIC | Age: 37
DRG: 853 | End: 2017-04-09
Payer: COMMERCIAL

## 2017-04-09 LAB
ANION GAP SERPL CALCULATED.3IONS-SCNC: 8 MMOL/L (ref 3–14)
BASOPHILS # BLD AUTO: 0 10E9/L (ref 0–0.2)
BASOPHILS NFR BLD AUTO: 0.2 %
BUN SERPL-MCNC: 23 MG/DL (ref 7–30)
CALCIUM SERPL-MCNC: 8 MG/DL (ref 8.5–10.1)
CHLORIDE SERPL-SCNC: 113 MMOL/L (ref 94–109)
CO2 SERPL-SCNC: 24 MMOL/L (ref 20–32)
CREAT SERPL-MCNC: 1.72 MG/DL (ref 0.52–1.04)
DIFFERENTIAL METHOD BLD: ABNORMAL
EOSINOPHIL # BLD AUTO: 0.4 10E9/L (ref 0–0.7)
EOSINOPHIL NFR BLD AUTO: 4.5 %
ERYTHROCYTE [DISTWIDTH] IN BLOOD BY AUTOMATED COUNT: 13.7 % (ref 10–15)
GFR SERPL CREATININE-BSD FRML MDRD: 33 ML/MIN/1.7M2
GLUCOSE BLDC GLUCOMTR-MCNC: 136 MG/DL (ref 70–99)
GLUCOSE BLDC GLUCOMTR-MCNC: 140 MG/DL (ref 70–99)
GLUCOSE BLDC GLUCOMTR-MCNC: 149 MG/DL (ref 70–99)
GLUCOSE BLDC GLUCOMTR-MCNC: 174 MG/DL (ref 70–99)
GLUCOSE BLDC GLUCOMTR-MCNC: 185 MG/DL (ref 70–99)
GLUCOSE SERPL-MCNC: 144 MG/DL (ref 70–99)
HCT VFR BLD AUTO: 29.3 % (ref 35–47)
HGB BLD-MCNC: 9.5 G/DL (ref 11.7–15.7)
IMM GRANULOCYTES # BLD: 0.1 10E9/L (ref 0–0.4)
IMM GRANULOCYTES NFR BLD: 1.4 %
LYMPHOCYTES # BLD AUTO: 2.6 10E9/L (ref 0.8–5.3)
LYMPHOCYTES NFR BLD AUTO: 26.2 %
MAGNESIUM SERPL-MCNC: 2.3 MG/DL (ref 1.6–2.3)
MCH RBC QN AUTO: 28 PG (ref 26.5–33)
MCHC RBC AUTO-ENTMCNC: 32.4 G/DL (ref 31.5–36.5)
MCV RBC AUTO: 86 FL (ref 78–100)
MONOCYTES # BLD AUTO: 0.4 10E9/L (ref 0–1.3)
MONOCYTES NFR BLD AUTO: 4 %
NEUTROPHILS # BLD AUTO: 6.2 10E9/L (ref 1.6–8.3)
NEUTROPHILS NFR BLD AUTO: 63.7 %
NRBC # BLD AUTO: 0 10*3/UL
NRBC BLD AUTO-RTO: 0 /100
PHOSPHATE SERPL-MCNC: 3.9 MG/DL (ref 2.5–4.5)
PLATELET # BLD AUTO: 413 10E9/L (ref 150–450)
POTASSIUM SERPL-SCNC: 3.4 MMOL/L (ref 3.4–5.3)
RBC # BLD AUTO: 3.39 10E12/L (ref 3.8–5.2)
SODIUM SERPL-SCNC: 145 MMOL/L (ref 133–144)
WBC # BLD AUTO: 9.8 10E9/L (ref 4–11)

## 2017-04-09 PROCEDURE — 84100 ASSAY OF PHOSPHORUS: CPT | Performed by: PHYSICIAN ASSISTANT

## 2017-04-09 PROCEDURE — 25000125 ZZHC RX 250: Performed by: PHYSICIAN ASSISTANT

## 2017-04-09 PROCEDURE — 85025 COMPLETE CBC W/AUTO DIFF WBC: CPT | Performed by: PHYSICIAN ASSISTANT

## 2017-04-09 PROCEDURE — 76770 US EXAM ABDO BACK WALL COMP: CPT

## 2017-04-09 PROCEDURE — 83735 ASSAY OF MAGNESIUM: CPT | Performed by: PHYSICIAN ASSISTANT

## 2017-04-09 PROCEDURE — 25000128 H RX IP 250 OP 636: Performed by: PHYSICIAN ASSISTANT

## 2017-04-09 PROCEDURE — 12000007 ZZH R&B INTERMEDIATE

## 2017-04-09 PROCEDURE — 00000146 ZZHCL STATISTIC GLUCOSE BY METER IP

## 2017-04-09 PROCEDURE — 36415 COLL VENOUS BLD VENIPUNCTURE: CPT | Performed by: PHYSICIAN ASSISTANT

## 2017-04-09 PROCEDURE — E2402 NEG PRESS WOUND THERAPY PUMP: HCPCS

## 2017-04-09 PROCEDURE — 99233 SBSQ HOSP IP/OBS HIGH 50: CPT | Performed by: INTERNAL MEDICINE

## 2017-04-09 PROCEDURE — 40000193 ZZH STATISTIC PT WARD VISIT: Performed by: PHYSICAL THERAPIST

## 2017-04-09 PROCEDURE — 97530 THERAPEUTIC ACTIVITIES: CPT | Mod: GP | Performed by: PHYSICAL THERAPIST

## 2017-04-09 PROCEDURE — 25000131 ZZH RX MED GY IP 250 OP 636 PS 637: Performed by: PHYSICIAN ASSISTANT

## 2017-04-09 PROCEDURE — 40000275 ZZH STATISTIC RCP TIME EA 10 MIN

## 2017-04-09 PROCEDURE — 94640 AIRWAY INHALATION TREATMENT: CPT

## 2017-04-09 PROCEDURE — 80048 BASIC METABOLIC PNL TOTAL CA: CPT | Performed by: PHYSICIAN ASSISTANT

## 2017-04-09 PROCEDURE — 25000132 ZZH RX MED GY IP 250 OP 250 PS 637: Performed by: INTERNAL MEDICINE

## 2017-04-09 PROCEDURE — 25000132 ZZH RX MED GY IP 250 OP 250 PS 637: Performed by: PHYSICIAN ASSISTANT

## 2017-04-09 PROCEDURE — 97110 THERAPEUTIC EXERCISES: CPT | Mod: GP | Performed by: PHYSICAL THERAPIST

## 2017-04-09 RX ORDER — AMLODIPINE BESYLATE 5 MG/1
5 TABLET ORAL DAILY
Status: DISCONTINUED | OUTPATIENT
Start: 2017-04-09 | End: 2017-04-12

## 2017-04-09 RX ORDER — IPRATROPIUM BROMIDE AND ALBUTEROL SULFATE 2.5; .5 MG/3ML; MG/3ML
3 SOLUTION RESPIRATORY (INHALATION) EVERY 4 HOURS PRN
Status: DISCONTINUED | OUTPATIENT
Start: 2017-04-09 | End: 2017-04-13 | Stop reason: HOSPADM

## 2017-04-09 RX ADMIN — HYDRALAZINE HYDROCHLORIDE 10 MG: 20 INJECTION INTRAMUSCULAR; INTRAVENOUS at 08:40

## 2017-04-09 RX ADMIN — INSULIN GLARGINE 5 UNITS: 100 INJECTION, SOLUTION SUBCUTANEOUS at 22:30

## 2017-04-09 RX ADMIN — INSULIN ASPART 1 UNITS: 100 INJECTION, SOLUTION INTRAVENOUS; SUBCUTANEOUS at 17:24

## 2017-04-09 RX ADMIN — HEPARIN SODIUM 5000 UNITS: 10000 INJECTION, SOLUTION INTRAVENOUS; SUBCUTANEOUS at 15:40

## 2017-04-09 RX ADMIN — IPRATROPIUM BROMIDE AND ALBUTEROL SULFATE 3 ML: .5; 3 SOLUTION RESPIRATORY (INHALATION) at 08:22

## 2017-04-09 RX ADMIN — INSULIN ASPART 1 UNITS: 100 INJECTION, SOLUTION INTRAVENOUS; SUBCUTANEOUS at 13:05

## 2017-04-09 RX ADMIN — MICONAZOLE NITRATE: 2 POWDER TOPICAL at 22:30

## 2017-04-09 RX ADMIN — HEPARIN SODIUM 5000 UNITS: 10000 INJECTION, SOLUTION INTRAVENOUS; SUBCUTANEOUS at 06:48

## 2017-04-09 RX ADMIN — MICONAZOLE NITRATE: 2 POWDER TOPICAL at 11:19

## 2017-04-09 RX ADMIN — HEPARIN SODIUM 5000 UNITS: 10000 INJECTION, SOLUTION INTRAVENOUS; SUBCUTANEOUS at 22:32

## 2017-04-09 RX ADMIN — Medication 12.5 MG: at 11:18

## 2017-04-09 RX ADMIN — AMLODIPINE BESYLATE 5 MG: 5 TABLET ORAL at 11:18

## 2017-04-09 NOTE — PLAN OF CARE
Problem: Goal Outcome Summary  Goal: Goal Outcome Summary  PT - Attempted to see pt but she is at ultrasound. Noted she had a fall earlier this morning while transferring to commode with CNA. Will try back this afternoon as able.

## 2017-04-09 NOTE — PLAN OF CARE
Problem: Goal Outcome Summary  Goal: Goal Outcome Summary  Outcome: No Change  Alert and orientated. Mom present and attentive to pt. Complaining of discomfort with rectal tube. Repositioned often. Will continue to monitor.

## 2017-04-09 NOTE — PLAN OF CARE
Problem: Goal Outcome Summary  Goal: Goal Outcome Summary  Outcome: Improving  A&O, forgetful at times, vs stable, BP's elevated, parameters not met for prn BP med.  Oxygen on at 2 liters, reports mild pain in rectum due to tube, declines pain medication.  Had 2 large incontinent loose stools around rectal tube, wong intact, encouraging po fluids.  Turning and repositioning frequently, had long arm sitter overnight, slightly impulsive at times.  Will continue to monitor.

## 2017-04-09 NOTE — PLAN OF CARE
Problem: Goal Outcome Summary  Goal: Goal Outcome Summary  Outcome: Improving  Up with assist of 2 and walker, needs cues with movement.denies pain. Rectal tube removed this am, incontinent x2 today. Started on po BP medication in attempts to not need IV. Moseley present to allow for wound healing on right side of groin. Sat in chair x3 today. Mother at bedside and attentive to patient.

## 2017-04-09 NOTE — PLAN OF CARE
Problem: Goal Outcome Summary  Goal: Goal Outcome Summary  SLP-- Pt not seen today short staffed.  Pt to be seen tomorrow.

## 2017-04-09 NOTE — PROGRESS NOTES
[facility]  Hospitalist Progress Note  Jakub Fenton MD  04/09/2017    Assessment & Plan   Caro Landis is a 36 year old female with past medical history of PCOS and hyperlipidemia who was admitted on 3/29/2017 for necrotizing fasciitis of the right groin. She underwent debridement on March 29 and 30 and wound vac placement on 4/3 and removal 4/4. She was treated in the ICU for septic shock. Post operatively she was delirious and psychiatry was consulted. She was transferred out of ICU on 4/8 to hospitalist service for further care.       Necrotizing fascitis of R groin now s/p debridement in the OR on 3/29 and 3/30.   CT scan on admission shows extensive gas pattern in R groin suggestive of necrotizing fasciitis   -General Surgery Following  -Wound ostomy following  -Zosyn and daptomysin D/C'd on 4/6  -Wound cultures with Prevotella disiens and light growth corynebacterium  -wong catheter in place to assist with wound healing     Delirium:   -Previously required precedex infusion, weaned 4/6 at 15:15  -psychiatry assistance appreciated  -decrease seroquel to 12.5mg x 2 more doses then d/c given clearing mental status  -QT interval was normal on 4/6  - currently improving     Candida albicans in sputum and urine. Likely in siginficant. Discontinued fluconazole on 4/8     Right groin candidiasis  -continue miconazole powder and wound cares     Acute blood loss anemia  -Hgb ~ 9.0, stable  -transfuse to keep greater than 7     Stress Hyperglycemia in the setting of type II DM  -hga1c on admission 9.6. Appears to be a new diagnosis to patient and she reports she was never diagnosed with this until this admission.   -high SSI and Lantus 5 units  -goal BG<180 for optimal healing     PCOS  -has previously been on metformin and spironolactone but was not taking PTA  - Metformin not appropriate at this time given her DONNA     Elevated BP readings with no history of HTN  - given persistent elevation  "and controlled pain, started her on amlodipine 5mg daily on 4/9  - will benefit from acei/arb once DONNA resolves and stablizes given underlying DM  -prn hydralazine ordered for SBP >180 and DBP >100     Acute kidney injury   - suspect likely ATN from sepsis/septic shock. Cr. <1 on admission  - Creatinine peaked to 2.47  - no stable ~1.7 and making urine  - will obtain renal US to ensure no obstruction  - F/u creatinine daily    Loose stools:  Likely from TF. Resolving.   - C. Diff neg x 2     Tobacco Abuse - smoked 1/2 ppd PTA, ready to quit, no cravings, denies need for nicotine replacement  Hypotension, presumed to be due to septic shock - resolved  Acute respiratory failure, post-operatively secondary to acute illness, extubated 4/5 - resolved     Hypernatremia, Hyponatremia - resolved  Redness of the face without apparent angioedema, some swelling of eyelids- resolved         DVT Prophylaxis: Heparin SQ  Diet: Soft Diet with Thin liquids, magic cup supplements  Code Status: Full Code     Disposition: PT recommending ARU, likely over the next 2-3 days pending improving renal function, clearing mental status and per surgery      Interval History   Patient feels her mental status clearing. Denies chest pain or shortness of breath. She is afebrile. Denies nausea and vomiting.       -Data reviewed today: I reviewed all new labs and imaging over the last 24 hours. I personally reviewed no images or EKG's today.    Physical Exam   Heart Rate: 69, Blood pressure (!) 184/110, pulse 68, temperature 98.2  F (36.8  C), temperature source Oral, resp. rate 16, height 1.727 m (5' 8\"), weight 119 kg (262 lb 5.6 oz), SpO2 93 %.  Vitals:    04/06/17 0500 04/07/17 0500 04/08/17 0600   Weight: 115.9 kg (255 lb 8.2 oz) 118 kg (260 lb 2.3 oz) 119 kg (262 lb 5.6 oz)     Vital Signs with Ranges  Temp:  [97.8  F (36.6  C)-98.3  F (36.8  C)] 98.2  F (36.8  C)  Pulse:  [68] 68  Heart Rate:  [69-90] 69  Resp:  [9-24] 16  BP: " (132-184)/() 184/110  SpO2:  [89 %-98 %] 93 %  I/O's Last 24 hours  I/O last 3 completed shifts:  In: 810 [P.O.:770; I.V.:40]  Out: 1675 [Urine:1575; Stool:100]    Constitutional: Alert, awake and no apparent distress   Respiratory: Clear to auscultation bilaterally, no wheezing   Cardiovascular: Regular rate and rhythm  GI: soft, NT, ND, normal bowel sounds  Skin: right groin wound with good granulation tissue and no signs of cellulitis in surrounding skin  Other:no LE edema    Medications   All medications I am responsible for were reviewed.    IV fluid REPLACEMENT ONLY         insulin aspart  1-7 Units Subcutaneous TID AC     insulin aspart  1-5 Units Subcutaneous At Bedtime     insulin glargine  5 Units Subcutaneous At Bedtime     QUEtiapine  25 mg Oral BID     miconazole   Topical BID     ipratropium - albuterol 0.5 mg/2.5 mg/3 mL  3 mL Nebulization 4x daily     heparin  5,000 Units Subcutaneous Q8H        Data     Recent Labs  Lab 04/09/17  0656 04/08/17  0435 04/07/17  0600  04/04/17  0430   WBC 9.8 11.2* 7.9  < > 9.3   HGB 9.5* 9.3* 9.2*  < > 9.3*   MCV 86 87 87  < > 87    423 384  < > 339   * 147* 149*  < > 149*   POTASSIUM 3.4 3.4 3.8  < > 3.7   CHLORIDE 113* 115* 118*  < > 116*   CO2 24 23 24  < > 21   BUN 23 25 25  < > 19   CR 1.72* 1.72* 1.82*  < > 1.92*   ANIONGAP 8 9 7  < > 12   ROBERT 8.0* 8.1* 8.1*  < > 7.9*   * 161* 202*  < > 130*   ALBUMIN  --   --   --   --  1.8*   < > = values in this interval not displayed.    No results found for this or any previous visit (from the past 24 hour(s)).

## 2017-04-09 NOTE — PROVIDER NOTIFICATION
Spoke to on call for Hospitalist regarding pt's request to have rectal tube removed.  No new order at this time.

## 2017-04-09 NOTE — PROGRESS NOTES
"General Surgery Progress Note    Date of service: 4/9/2017    Subjective: Denies pain, feels slightly anxious, appropriate    Objective:  BP (!) 184/110  Pulse 68  Temp 98.2  F (36.8  C) (Oral)  Resp 16  Ht 1.727 m (5' 8\")  Wt 119 kg (262 lb 5.6 oz)  SpO2 93%  BMI 39.89 kg/m2  GEN: NAD  PULM: Breathing comfortably  ABD: soft, NT, ND  EXTR: normal, under dressing      Assessment and Plan:  Caro Landis is a 36 yof s/p right groin excisional debridement of nec fasc  - appreciate cares per primary  - continue daily dressing changes        Charlotte Al MD, PGY4  [phone]      "

## 2017-04-09 NOTE — SIGNIFICANT EVENT
At 1030 NA called writer to let her know that pt fell while transferring from the wheelchair to the commode. NA was present and lowered pt to the floor. Pt was alert. Jermain LEAL came to evaluate pt and did not feel any further testing was required. Patient was able to help herself get up, gait belt and 2 RN's were present. Pt hospitalist was notified of the event as well.

## 2017-04-09 NOTE — PLAN OF CARE
Problem: Goal Outcome Summary  Goal: Goal Outcome Summary  PT - Pt tired after long day but agreeable to PT, nervous about mobility and knows she is weak. Much more awake/alert/conversational today. Worked on standing pre-gait activities and exercises, pt tolerated well and seems much stronger than yesterday. Will initiate gait tomorrow with w/c follow for safety, also increasing PT to BID tomorrow. Recommend ARU at discharge, pt and family in agreement.

## 2017-04-09 NOTE — PROGRESS NOTES
House MD Note  Re: fall    37yo female admitted 3/29/17 for treatment of acute necrotizing soft tissue infection. Called to evaluate patient after she experienced a witnessed fall around 10:30am. She was attempting to use the commode with the assistance of nursing student, when she lost her footing and fell to the ground. Patient reports hitting her tailbone on the ground and her head on the commode. She currently denies any significant pain, mild discomfort to occiput of her head. No bruises or lesions noted. No pain to palpation of either back or head. Vitals stable. No further imaging recommended at this time.       MD Jermain Joseph Physician  Time spent: 15 mins

## 2017-04-10 ENCOUNTER — APPOINTMENT (OUTPATIENT)
Dept: SPEECH THERAPY | Facility: CLINIC | Age: 37
DRG: 853 | End: 2017-04-10
Payer: COMMERCIAL

## 2017-04-10 ENCOUNTER — APPOINTMENT (OUTPATIENT)
Dept: PHYSICAL THERAPY | Facility: CLINIC | Age: 37
DRG: 853 | End: 2017-04-10
Payer: COMMERCIAL

## 2017-04-10 LAB
ANION GAP SERPL CALCULATED.3IONS-SCNC: 7 MMOL/L (ref 3–14)
BACTERIA SPEC CULT: NO GROWTH
BUN SERPL-MCNC: 19 MG/DL (ref 7–30)
CALCIUM SERPL-MCNC: 8 MG/DL (ref 8.5–10.1)
CHLORIDE SERPL-SCNC: 112 MMOL/L (ref 94–109)
CO2 SERPL-SCNC: 25 MMOL/L (ref 20–32)
CREAT SERPL-MCNC: 1.64 MG/DL (ref 0.52–1.04)
GFR SERPL CREATININE-BSD FRML MDRD: 35 ML/MIN/1.7M2
GLUCOSE BLDC GLUCOMTR-MCNC: 133 MG/DL (ref 70–99)
GLUCOSE BLDC GLUCOMTR-MCNC: 138 MG/DL (ref 70–99)
GLUCOSE BLDC GLUCOMTR-MCNC: 143 MG/DL (ref 70–99)
GLUCOSE BLDC GLUCOMTR-MCNC: 145 MG/DL (ref 70–99)
GLUCOSE BLDC GLUCOMTR-MCNC: 170 MG/DL (ref 70–99)
GLUCOSE SERPL-MCNC: 154 MG/DL (ref 70–99)
Lab: NORMAL
MAGNESIUM SERPL-MCNC: 2.2 MG/DL (ref 1.6–2.3)
MICRO REPORT STATUS: NORMAL
PHOSPHATE SERPL-MCNC: 3.3 MG/DL (ref 2.5–4.5)
PLATELET # BLD AUTO: 393 10E9/L (ref 150–450)
POTASSIUM SERPL-SCNC: 3.3 MMOL/L (ref 3.4–5.3)
POTASSIUM SERPL-SCNC: 3.8 MMOL/L (ref 3.4–5.3)
SODIUM SERPL-SCNC: 144 MMOL/L (ref 133–144)
SPECIMEN SOURCE: NORMAL

## 2017-04-10 PROCEDURE — 36415 COLL VENOUS BLD VENIPUNCTURE: CPT | Performed by: PHYSICIAN ASSISTANT

## 2017-04-10 PROCEDURE — 12000007 ZZH R&B INTERMEDIATE

## 2017-04-10 PROCEDURE — 40000193 ZZH STATISTIC PT WARD VISIT: Performed by: PHYSICAL THERAPIST

## 2017-04-10 PROCEDURE — 80048 BASIC METABOLIC PNL TOTAL CA: CPT | Performed by: PHYSICIAN ASSISTANT

## 2017-04-10 PROCEDURE — E2402 NEG PRESS WOUND THERAPY PUMP: HCPCS

## 2017-04-10 PROCEDURE — 40000225 ZZH STATISTIC SLP WARD VISIT: Performed by: SPEECH-LANGUAGE PATHOLOGIST

## 2017-04-10 PROCEDURE — 97116 GAIT TRAINING THERAPY: CPT | Mod: GP | Performed by: PHYSICAL THERAPIST

## 2017-04-10 PROCEDURE — 25000131 ZZH RX MED GY IP 250 OP 636 PS 637: Performed by: PHYSICIAN ASSISTANT

## 2017-04-10 PROCEDURE — 83735 ASSAY OF MAGNESIUM: CPT | Performed by: PHYSICIAN ASSISTANT

## 2017-04-10 PROCEDURE — 84100 ASSAY OF PHOSPHORUS: CPT | Performed by: PHYSICIAN ASSISTANT

## 2017-04-10 PROCEDURE — 25000128 H RX IP 250 OP 636: Performed by: PHYSICIAN ASSISTANT

## 2017-04-10 PROCEDURE — 99232 SBSQ HOSP IP/OBS MODERATE 35: CPT | Performed by: INTERNAL MEDICINE

## 2017-04-10 PROCEDURE — 85049 AUTOMATED PLATELET COUNT: CPT | Performed by: PHYSICIAN ASSISTANT

## 2017-04-10 PROCEDURE — 97110 THERAPEUTIC EXERCISES: CPT | Mod: GP | Performed by: PHYSICAL THERAPIST

## 2017-04-10 PROCEDURE — 36415 COLL VENOUS BLD VENIPUNCTURE: CPT | Performed by: INTERNAL MEDICINE

## 2017-04-10 PROCEDURE — 97530 THERAPEUTIC ACTIVITIES: CPT | Mod: GP | Performed by: PHYSICAL THERAPIST

## 2017-04-10 PROCEDURE — 92526 ORAL FUNCTION THERAPY: CPT | Mod: GN | Performed by: SPEECH-LANGUAGE PATHOLOGIST

## 2017-04-10 PROCEDURE — 99211 OFF/OP EST MAY X REQ PHY/QHP: CPT

## 2017-04-10 PROCEDURE — 00000146 ZZHCL STATISTIC GLUCOSE BY METER IP

## 2017-04-10 PROCEDURE — 84132 ASSAY OF SERUM POTASSIUM: CPT | Performed by: INTERNAL MEDICINE

## 2017-04-10 PROCEDURE — 25000132 ZZH RX MED GY IP 250 OP 250 PS 637: Performed by: ANESTHESIOLOGY

## 2017-04-10 PROCEDURE — 25000132 ZZH RX MED GY IP 250 OP 250 PS 637: Performed by: INTERNAL MEDICINE

## 2017-04-10 RX ORDER — LIDOCAINE HYDROCHLORIDE 40 MG/ML
30-50 SOLUTION TOPICAL DAILY PRN
Status: DISCONTINUED | OUTPATIENT
Start: 2017-04-10 | End: 2017-04-10

## 2017-04-10 RX ADMIN — HEPARIN SODIUM 5000 UNITS: 10000 INJECTION, SOLUTION INTRAVENOUS; SUBCUTANEOUS at 14:52

## 2017-04-10 RX ADMIN — AMLODIPINE BESYLATE 5 MG: 5 TABLET ORAL at 09:12

## 2017-04-10 RX ADMIN — MICONAZOLE NITRATE: 2 POWDER TOPICAL at 21:48

## 2017-04-10 RX ADMIN — POTASSIUM CHLORIDE 40 MEQ: 1500 TABLET, EXTENDED RELEASE ORAL at 09:12

## 2017-04-10 RX ADMIN — MICONAZOLE NITRATE: 2 POWDER TOPICAL at 09:15

## 2017-04-10 RX ADMIN — INSULIN GLARGINE 5 UNITS: 100 INJECTION, SOLUTION SUBCUTANEOUS at 21:48

## 2017-04-10 RX ADMIN — HEPARIN SODIUM 5000 UNITS: 10000 INJECTION, SOLUTION INTRAVENOUS; SUBCUTANEOUS at 21:48

## 2017-04-10 RX ADMIN — POTASSIUM CHLORIDE 20 MEQ: 1500 TABLET, EXTENDED RELEASE ORAL at 11:25

## 2017-04-10 RX ADMIN — HEPARIN SODIUM 5000 UNITS: 10000 INJECTION, SOLUTION INTRAVENOUS; SUBCUTANEOUS at 07:09

## 2017-04-10 NOTE — PLAN OF CARE
Problem: Infection, Risk/Actual (Adult)  Goal: Identify Related Risk Factors and Signs and Symptoms  Related risk factors and signs and symptoms are identified upon initiation of Human [employer] Clinical Practice Guideline (CPG)   Outcome: Improving  Pt A/Ox4. VSS. Up 1 assist w/ walker, ambulated in weinberg x2. Denies pain. CMS intact except 2+ bilateral LE edema. Dressing changed. Moseley patent. BM x3, loose. K+ replaced with recheck of 3.8. DD 3 diet tolerated. IJ removed at 1530.

## 2017-04-10 NOTE — PROGRESS NOTES
Care Transition Initial Assessment - SW  Reason For Consult: discharge planning  Met with: Patient and family  Active Problems:    Necrotizing fasciitis (H)    Necrotizing soft tissue infection (H)    Type 2 diabetes mellitus without complication (H)         DATA  Lives With: other (see comments) (Roommate)  Living Arrangements: house  Description of Support System: Supportive, Involved  Who is your support system?: Parent(s), Sibling(s), Other (specify) (Friend)  Support Assessment: Adequate family and caregiver support, Adequate social supports.   Identified issues/concerns regarding health management:   Patient feels that they have adequate support @ home?  Yes           Quality Of Family Relationships: supportive, involved      ASSESSMENT  Cognitive Status:  Awake, alert and oriented X3.  Concerns to be addressed:     Per MD order, SW following for discharge planning needs.  Patient is a 36-year-old female who was admitted to the hospital on 3-29-17 with a right groin wound.  Prior to hospitalization, patient was living with a roommate in a house where they were managing well and patient was independent at home.  Patient is aware that therapy is currently recommending Acute Rehab at discharge.  Patient is in agreement with a referral being made to  Acute Rehab.  SW made a referral via Discharge on the Double and spoke to Angela in Admissions to inform her of the referral and that the anticipated discharge date is for tomorrow.  SW will follow up regarding transportation once the discharge plan has been finalized.     PLAN  Financial costs for the patient includes:  Patient given options and choices for discharge: Acute Rehab facility options discussed.  Patient/family is agreeable to the plan?  Yes  Patient Goals and Preferences: Acute Rehab at discharge.  Patient anticipates discharging to:  Acute Rehab at discharge.    DAVID Lebron

## 2017-04-10 NOTE — PLAN OF CARE
Problem: Goal Outcome Summary  Goal: Goal Outcome Summary  PT: Patient with significant noted improvement today; Little complaint of R groin pain; Not symptomatic with OOB mobility other than fatigue; able to perform bed mobility today with HOB elevated, use of bedrail and SBA; transfers with min/CGA; min/CGA of 1 for gait with a rolling walker and wheelchair follow; able to ambulate 70 feet x 2 with a seated rest break. Given patient's previous level of independence, motivation, deficits with strength, balance, and impaired mobility, recommend ARU at discharge.

## 2017-04-10 NOTE — PROGRESS NOTES
"General Surgery Progress Note     Hypertensive at times, tolerating diet, rectal tube out, +BM, PT following.   Vitals: Blood pressure (!) 162/97, pulse 78, temperature 98  F (36.7  C), temperature source Oral, resp. rate 16, height 5' 8\" (1.727 m), weight 254 lb 3.1 oz (115.3 kg), SpO2 96 %.  Temperature Temp  Av.3  F (36.8  C)  Min: 97.8  F (36.6  C)  Max: 99  F (37.2  C)   I/O last 3 completed shifts:  In:  [P.O.:]  Out:  [Urine:]    Wound(s): clean granulation tissue, no erythema, mild exudative tissue inferiorly, candida at edges and excoriated skin in perianal area improved.    Recent Labs   Lab Test  04/10/17   0637  17   0656  17   0435  17   0600   WBC   --   9.8  11.2*  7.9   HGB   --   9.5*  9.3*  9.2*   HCT   --   29.3*  28.7*  28.2*   PLT  393  413  423  384      Recent Labs   Lab Test  04/10/17   0637  17   0656  17   0435   NA  144  145*  147*   POTASSIUM  3.3*  3.4  3.4   CHLORIDE  112*  113*  115*   CO2  25  24  23   BUN  19  23  25   CR  1.64*  1.72*  1.72*      36 year old female with necrotizing fascitis R groin/thigh S/P Procedure(s):  Right groin exploration with excisional debridement of necrotic tissue 3/30/17  Wound vac placement 4/3/17, off now.  - Dressing changed at bedside   - Continue wet to dry dressing bid  - Plan for wound vac at bedside today or tomorrow, Dr. Neal to see pt later today  - Completed course of Daptomycin and Zosyn  - Appreciate [facility] assistance/recs  - Continue medical management, appreciate help  - ARU at discharge, hope sometime this week if medically stable    Sandra Delatorre PA-C  [facility]  [phone]    Addendum: Agree with above. Wound much improved. Wound vac tomorrow.     Mary Beth Neal MD  [facility]  [phone]      "

## 2017-04-10 NOTE — PROGRESS NOTES
[facility]  Hospitalist Progress Note  Jama Milligan MD  04/10/2017    Assessment & Plan   Caro Landis is a 36 year old female with past medical history of PCOS and hyperlipidemia who was admitted on 3/29/2017 for necrotizing fasciitis of the right groin.  She underwent debridement on March 29 and 30 and wound vac placement on 4/3 and removal 4/4.  She was treated in the ICU for septic shock.  Post operatively she was delirious and psychiatry was consulted.  She was transferred out of ICU on 4/8 to hospitalist service for further care.       Necrotizing fascitis of R groin:   S/p debridement in the OR on 3/29 and 3/30.   CT scan on admission showed extensive gas pattern in R groin suggestive of necrotizing fasciitis   -General Surgery Following  -Wound ostomy following  -Zosyn and daptomysin D/C'd on 4/6  -Wound cultures with Prevotella disiens and light growth corynebacterium  -owng catheter in place to assist with wound healing     Delirium:   -Previously required precedex infusion, weaned 4/6 at 15:15  -psychiatry assistance appreciated  -Seroquel d/mary  -QT interval was normal on 4/6  - currently improving     Candida albicans in sputum and urine. Likely insiginficant. Discontinued fluconazole on 4/8     Right groin candidiasis  -continue miconazole powder and wound cares     Acute blood loss anemia  -Hgb ~ 9.0, stable  -transfuse to keep greater than 7     Newly diagnosed DM type II  -hga1c on admission 9.6. Appears to be a new diagnosis to patient and she reports she was never diagnosed with this until this admission.   -high SSI and Lantus 5 units  -FSG was 133 this am  -goal BG<180 for optimal healing     PCOS  -has previously been on metformin and spironolactone but was not taking PTA  - Metformin not appropriate at this time given her DONNA     Newly diagnosed HTN  - given persistent elevation and controlled pain, started her on amlodipine 5mg daily on 4/9  - will benefit from acei/arb  "once DONNA resolves and stablizes given underlying DM  -prn hydralazine ordered for SBP >180 and DBP >100     Acute kidney injury   - suspect likely ATN from sepsis/septic shock. Cr. <1 on admission  - Creatinine peaked to 2.47  - Cr down to 1.64 this am.  - UOP was 1800 cc yesterday and 1200 thus far today.   - Renal US on 4/9 was unremarkable.  - F/u creatinine daily    Loose stools:  Likely from TF. Resolving.   - C. Diff neg x 2     Tobacco Abuse - smoked 1/2 ppd PTA, ready to quit, no cravings, denies need for nicotine replacement  Hypotension, presumed to be due to septic shock - resolved  Acute respiratory failure, post-operatively secondary to acute illness, extubated 4/5 - resolved     Hypernatremia, Hyponatremia - resolved  Redness of the face without apparent angioedema, some swelling of eyelids- resolved         DVT Prophylaxis: Heparin SQ  Diet: Soft Diet with Thin liquids, magic cup supplements  Code Status: Full Code     Disposition: PT recommending ARU, likely over the next 1-2 days pending improving renal function, clearing mental status and per surgery    Jama Milligan MD.   Hospitalist.  [phone], pager.    Interval History   Has no complaints.  She wants left IJ removed.    -Data reviewed today: I reviewed all new labs and imaging over the last 24 hours.    Physical Exam   Heart Rate: 73, Blood pressure (!) 162/97, pulse 78, temperature 98  F (36.7  C), temperature source Oral, resp. rate 16, height 1.727 m (5' 8\"), weight 115.3 kg (254 lb 3.1 oz), SpO2 96 %.  Vitals:    04/07/17 0500 04/08/17 0600 04/10/17 0540   Weight: 118 kg (260 lb 2.3 oz) 119 kg (262 lb 5.6 oz) 115.3 kg (254 lb 3.1 oz)     Vital Signs with Ranges  Temp:  [97.8  F (36.6  C)-99  F (37.2  C)] 98  F (36.7  C)  Pulse:  [78] 78  Heart Rate:  [73-81] 73  Resp:  [16-18] 16  BP: (153-170)/(85-97) 162/97  SpO2:  [92 %-96 %] 96 %  I/O's Last 24 hours  I/O last 3 completed shifts:  In: 1950 [P.O.:1950]  Out: 2050 " [Urine:2050]    Constitutional: Alert, awake and no apparent distress   Respiratory: Clear to auscultation bilaterally, no wheezing   Cardiovascular: Regular rate and rhythm  GI: soft, NT, ND, normal bowel sounds  Skin: right groin wound with good granulation tissue and no signs of cellulitis in surrounding skin  Other:no LE edema    Medications   All medications I am responsible for were reviewed.    IV fluid REPLACEMENT ONLY         amLODIPine  5 mg Oral Daily     insulin aspart  1-7 Units Subcutaneous TID AC     insulin aspart  1-5 Units Subcutaneous At Bedtime     insulin glargine  5 Units Subcutaneous At Bedtime     miconazole   Topical BID     heparin  5,000 Units Subcutaneous Q8H        Data     Recent Labs  Lab 04/10/17  0637 04/09/17  0656 04/08/17  0435 04/07/17  0600  04/04/17  0430   WBC  --  9.8 11.2* 7.9  < > 9.3   HGB  --  9.5* 9.3* 9.2*  < > 9.3*   MCV  --  86 87 87  < > 87    413 423 384  < > 339    145* 147* 149*  < > 149*   POTASSIUM 3.3* 3.4 3.4 3.8  < > 3.7   CHLORIDE 112* 113* 115* 118*  < > 116*   CO2 25 24 23 24  < > 21   BUN 19 23 25 25  < > 19   CR 1.64* 1.72* 1.72* 1.82*  < > 1.92*   ANIONGAP 7 8 9 7  < > 12   ROBERT 8.0* 8.0* 8.1* 8.1*  < > 7.9*   * 144* 161* 202*  < > 130*   ALBUMIN  --   --   --   --   --  1.8*   < > = values in this interval not displayed.    No results found for this or any previous visit (from the past 24 hour(s)).

## 2017-04-10 NOTE — PLAN OF CARE
Problem: Goal Outcome Summary  Goal: Goal Outcome Summary        SLP - Pt seen for swallowing Tx in pm. Pt alert and upright in chair. Pt reported no eating or swallowing issues and expressed interest in upgrading her diet. PO trials presented with thin liquids via straw, semisolids, soft solids, and solids. Pt's laryngeal elevation, oral transit, and swallow initiation observed to be WFL for all PO trials. Pt provided education regarding dysphagia diet level 3 and swallowing precautions. Pt verbalized understanding. RN updated regarding diet. Plan to ensure diet tolerance and upgrade diet as indicated. Recommend upgrade to dysphagia diet level 3 and continue thin liquids via straw with the following precautions: upright and alert for PO intake, slow rate, small bites/sips, and alternate textures. Discharge to ARU with SLP swallowing Tx to upgrade to regular diet as indicated.

## 2017-04-10 NOTE — PLAN OF CARE
Problem: Goal Outcome Summary  Goal: Goal Outcome Summary  Outcome: Improving  A&O, up with assist x 1-2 and walker to the chair and bsc.  Vs stable, BP's elevated, parameters for prn BP med not met, wong intact.  Had multiple continent loose Bm's, emre area reddened, applying powder.  Had prn drsg change x 1 to right groin, denies pain, declines pain medication, will continue to monitor.

## 2017-04-11 ENCOUNTER — APPOINTMENT (OUTPATIENT)
Dept: PHYSICAL THERAPY | Facility: CLINIC | Age: 37
DRG: 853 | End: 2017-04-11
Payer: COMMERCIAL

## 2017-04-11 ENCOUNTER — APPOINTMENT (OUTPATIENT)
Dept: SPEECH THERAPY | Facility: CLINIC | Age: 37
DRG: 853 | End: 2017-04-11
Payer: COMMERCIAL

## 2017-04-11 LAB
ANION GAP SERPL CALCULATED.3IONS-SCNC: 10 MMOL/L (ref 3–14)
BUN SERPL-MCNC: 18 MG/DL (ref 7–30)
CALCIUM SERPL-MCNC: 8.1 MG/DL (ref 8.5–10.1)
CHLORIDE SERPL-SCNC: 109 MMOL/L (ref 94–109)
CO2 SERPL-SCNC: 23 MMOL/L (ref 20–32)
CREAT SERPL-MCNC: 1.51 MG/DL (ref 0.52–1.04)
ERYTHROCYTE [DISTWIDTH] IN BLOOD BY AUTOMATED COUNT: 13.7 % (ref 10–15)
GFR SERPL CREATININE-BSD FRML MDRD: 38 ML/MIN/1.7M2
GLUCOSE BLDC GLUCOMTR-MCNC: 140 MG/DL (ref 70–99)
GLUCOSE BLDC GLUCOMTR-MCNC: 149 MG/DL (ref 70–99)
GLUCOSE BLDC GLUCOMTR-MCNC: 166 MG/DL (ref 70–99)
GLUCOSE BLDC GLUCOMTR-MCNC: 173 MG/DL (ref 70–99)
GLUCOSE SERPL-MCNC: 146 MG/DL (ref 70–99)
HCT VFR BLD AUTO: 28.8 % (ref 35–47)
HGB BLD-MCNC: 9.5 G/DL (ref 11.7–15.7)
MAGNESIUM SERPL-MCNC: 2.2 MG/DL (ref 1.6–2.3)
MCH RBC QN AUTO: 27.9 PG (ref 26.5–33)
MCHC RBC AUTO-ENTMCNC: 33 G/DL (ref 31.5–36.5)
MCV RBC AUTO: 85 FL (ref 78–100)
PHOSPHATE SERPL-MCNC: 3.8 MG/DL (ref 2.5–4.5)
PLATELET # BLD AUTO: 402 10E9/L (ref 150–450)
POTASSIUM SERPL-SCNC: 3.4 MMOL/L (ref 3.4–5.3)
RBC # BLD AUTO: 3.4 10E12/L (ref 3.8–5.2)
SODIUM SERPL-SCNC: 142 MMOL/L (ref 133–144)
WBC # BLD AUTO: 9.7 10E9/L (ref 4–11)

## 2017-04-11 PROCEDURE — 25000132 ZZH RX MED GY IP 250 OP 250 PS 637: Performed by: SURGERY

## 2017-04-11 PROCEDURE — 97606 NEG PRS WND THER DME>50 SQCM: CPT

## 2017-04-11 PROCEDURE — 92526 ORAL FUNCTION THERAPY: CPT | Mod: GN | Performed by: SPEECH-LANGUAGE PATHOLOGIST

## 2017-04-11 PROCEDURE — 12000007 ZZH R&B INTERMEDIATE

## 2017-04-11 PROCEDURE — 40000193 ZZH STATISTIC PT WARD VISIT: Performed by: PHYSICAL THERAPIST

## 2017-04-11 PROCEDURE — 97110 THERAPEUTIC EXERCISES: CPT | Mod: GP | Performed by: PHYSICAL THERAPIST

## 2017-04-11 PROCEDURE — 83735 ASSAY OF MAGNESIUM: CPT | Performed by: PHYSICIAN ASSISTANT

## 2017-04-11 PROCEDURE — 80048 BASIC METABOLIC PNL TOTAL CA: CPT | Performed by: PHYSICIAN ASSISTANT

## 2017-04-11 PROCEDURE — 25000131 ZZH RX MED GY IP 250 OP 636 PS 637: Performed by: PHYSICIAN ASSISTANT

## 2017-04-11 PROCEDURE — 25000132 ZZH RX MED GY IP 250 OP 250 PS 637: Performed by: INTERNAL MEDICINE

## 2017-04-11 PROCEDURE — 84100 ASSAY OF PHOSPHORUS: CPT | Performed by: PHYSICIAN ASSISTANT

## 2017-04-11 PROCEDURE — 25000128 H RX IP 250 OP 636: Performed by: PHYSICIAN ASSISTANT

## 2017-04-11 PROCEDURE — 36415 COLL VENOUS BLD VENIPUNCTURE: CPT | Performed by: PHYSICIAN ASSISTANT

## 2017-04-11 PROCEDURE — 99232 SBSQ HOSP IP/OBS MODERATE 35: CPT | Performed by: INTERNAL MEDICINE

## 2017-04-11 PROCEDURE — 99211 OFF/OP EST MAY X REQ PHY/QHP: CPT

## 2017-04-11 PROCEDURE — 40000225 ZZH STATISTIC SLP WARD VISIT: Performed by: SPEECH-LANGUAGE PATHOLOGIST

## 2017-04-11 PROCEDURE — E2402 NEG PRESS WOUND THERAPY PUMP: HCPCS

## 2017-04-11 PROCEDURE — 85027 COMPLETE CBC AUTOMATED: CPT | Performed by: PHYSICIAN ASSISTANT

## 2017-04-11 PROCEDURE — 97116 GAIT TRAINING THERAPY: CPT | Mod: GP | Performed by: PHYSICAL THERAPIST

## 2017-04-11 PROCEDURE — 00000146 ZZHCL STATISTIC GLUCOSE BY METER IP

## 2017-04-11 RX ADMIN — HEPARIN SODIUM 5000 UNITS: 10000 INJECTION, SOLUTION INTRAVENOUS; SUBCUTANEOUS at 22:46

## 2017-04-11 RX ADMIN — INSULIN ASPART 1 UNITS: 100 INJECTION, SOLUTION INTRAVENOUS; SUBCUTANEOUS at 18:13

## 2017-04-11 RX ADMIN — AMLODIPINE BESYLATE 5 MG: 5 TABLET ORAL at 08:36

## 2017-04-11 RX ADMIN — INSULIN ASPART 1 UNITS: 100 INJECTION, SOLUTION INTRAVENOUS; SUBCUTANEOUS at 12:37

## 2017-04-11 RX ADMIN — OXYCODONE HYDROCHLORIDE AND ACETAMINOPHEN 1 TABLET: 5; 325 TABLET ORAL at 08:55

## 2017-04-11 RX ADMIN — MICONAZOLE NITRATE: 2 POWDER TOPICAL at 12:37

## 2017-04-11 RX ADMIN — HEPARIN SODIUM 5000 UNITS: 10000 INJECTION, SOLUTION INTRAVENOUS; SUBCUTANEOUS at 07:14

## 2017-04-11 RX ADMIN — HEPARIN SODIUM 5000 UNITS: 10000 INJECTION, SOLUTION INTRAVENOUS; SUBCUTANEOUS at 15:07

## 2017-04-11 RX ADMIN — INSULIN GLARGINE 5 UNITS: 100 INJECTION, SOLUTION SUBCUTANEOUS at 22:46

## 2017-04-11 RX ADMIN — INSULIN ASPART 1 UNITS: 100 INJECTION, SOLUTION INTRAVENOUS; SUBCUTANEOUS at 08:36

## 2017-04-11 RX ADMIN — MICONAZOLE NITRATE: 2 POWDER TOPICAL at 22:45

## 2017-04-11 NOTE — PROGRESS NOTES
[facility]  Hospitalist Progress Note  Jama Milligan MD  04/11/2017    Assessment & Plan   Caro Landis is a 36 year old female with past medical history of PCOS and hyperlipidemia who was admitted on 3/29/2017 for necrotizing fasciitis of the right groin.  She underwent debridement on March 29 and 30 and wound vac placement on 4/3 and removal 4/4.  She was treated in the ICU for septic shock.  Post operatively she was delirious and psychiatry was consulted.  She was transferred out of ICU on 4/8 to hospitalist service for further care.       Necrotizing fascitis of R groin:   S/p debridement in the OR on 3/29 and 3/30.   CT scan on admission showed extensive gas pattern in R groin suggestive of necrotizing fasciitis   -General Surgery Following  -Wound ostomy following  -Zosyn and daptomysin D/C'd on 4/6  -Wound cultures with Prevotella disiens and light growth corynebacterium  -wong catheter in place to assist with wound healing  -Wound vac placed on 4/10     Delirium:   -Previously required precedex infusion, weaned 4/6 at 15:15  -psychiatry assistance appreciated  -Seroquel d/mary  -QT interval was normal on 4/6  - resolved.    Candida albicans in sputum and urine. Likely insiginficant. Discontinued fluconazole on 4/8     Right groin candidiasis  -continue miconazole powder and wound cares     Acute blood loss anemia  -Hgb ~ 9.0, stable  -transfuse to keep greater than 7     Newly diagnosed DM type II  -hga1c on admission 9.6. Appears to be a new diagnosis to patient and she reports she was never diagnosed with this until this admission.   -high SSI and Lantus 5 units  -FSG was 146 this am  -goal BG<180 for optimal healing     PCOS  -has previously been on metformin and spironolactone but was not taking PTA  - Metformin not appropriate at this time given her DONNA     Newly diagnosed HTN  - given persistent elevation and controlled pain, started her on amlodipine 5mg daily on 4/9  - will  "benefit from acei/arb once DONNA resolves and stablizes given underlying DM  -prn hydralazine ordered for SBP >180 and DBP >100     Acute kidney injury   - suspect likely ATN from sepsis/septic shock. Cr. <1 on admission  - Creatinine peaked to 2.47  - Cr down to 1.51 this am.  - UOP was 2150 cc yesterday and 900 thus far today.   - Renal US on 4/9 was unremarkable.  - F/u creatinine daily    Loose stools:  Likely from TF. Resolving.   - C. Diff neg x 2     Tobacco Abuse - smoked 1/2 ppd PTA, ready to quit, no cravings, denies need for nicotine replacement  Hypotension, presumed to be due to septic shock - resolved  Acute respiratory failure, post-operatively secondary to acute illness, extubated 4/5 - resolved     Hypernatremia, Hyponatremia - resolved  Redness of the face without apparent angioedema, some swelling of eyelids- resolved         DVT Prophylaxis: Heparin SQ  Diet: Soft Diet with Thin liquids, magic cup supplements  Code Status: Full Code     Disposition:  Probable d/c to home in am.    Jama Milligan MD.   Hospitalist.  [phone], pager.    Interval History   Has no complaints.  She wants left IJ removed.    -Data reviewed today: I reviewed all new labs and imaging over the last 24 hours.    Physical Exam   Heart Rate: 73, Blood pressure (!) 142/93, pulse 73, temperature 98  F (36.7  C), temperature source Oral, resp. rate 16, height 1.727 m (5' 8\"), weight 112.4 kg (247 lb 12.8 oz), SpO2 95 %.  Vitals:    04/08/17 0600 04/10/17 0540 04/11/17 0717   Weight: 119 kg (262 lb 5.6 oz) 115.3 kg (254 lb 3.1 oz) 112.4 kg (247 lb 12.8 oz)     Vital Signs with Ranges  Temp:  [97.9  F (36.6  C)-98.7  F (37.1  C)] 98  F (36.7  C)  Pulse:  [73] 73  Heart Rate:  [66-80] 73  Resp:  [16] 16  BP: (142-160)/() 142/93  SpO2:  [93 %-96 %] 95 %  I/O's Last 24 hours  I/O last 3 completed shifts:  In: 600 [P.O.:600]  Out: 2150 [Urine:2150]    Constitutional: Alert, awake and no apparent distress   Respiratory: Clear to " auscultation bilaterally, no wheezing   Cardiovascular: Regular rate and rhythm  GI: soft, NT, ND, normal bowel sounds  Skin: right groin wound with good granulation tissue and no signs of cellulitis in surrounding skin  Other:no LE edema    Medications   All medications I am responsible for were reviewed.    IV fluid REPLACEMENT ONLY         amLODIPine  5 mg Oral Daily     insulin aspart  1-7 Units Subcutaneous TID AC     insulin aspart  1-5 Units Subcutaneous At Bedtime     insulin glargine  5 Units Subcutaneous At Bedtime     miconazole   Topical BID     heparin  5,000 Units Subcutaneous Q8H        Data     Recent Labs  Lab 04/11/17  0650 04/10/17  1550 04/10/17  0637 04/09/17  0656 04/08/17  0435   WBC 9.7  --   --  9.8 11.2*   HGB 9.5*  --   --  9.5* 9.3*   MCV 85  --   --  86 87     --  393 413 423     --  144 145* 147*   POTASSIUM 3.4 3.8 3.3* 3.4 3.4   CHLORIDE 109  --  112* 113* 115*   CO2 23  --  25 24 23   BUN 18  --  19 23 25   CR 1.51*  --  1.64* 1.72* 1.72*   ANIONGAP 10  --  7 8 9   ROBERT 8.1*  --  8.0* 8.0* 8.1*   *  --  154* 144* 161*       No results found for this or any previous visit (from the past 24 hour(s)).

## 2017-04-11 NOTE — PLAN OF CARE
Problem: Goal Outcome Summary  Goal: Goal Outcome Summary     SLP - Swallow Tx was provided this pm.  Patient reports no swallowing difficulty with soft solids or thin liquids; however, she dislikes her current food choices and is interested in a further diet upgrade.  Patient tolerated regular solids and thin liquids without overt signs of aspiration during a snack observation.  Cues were given to alternate textures.  Patient continues to demonstrate improved swallow function.  Recommend a diet upgrade to regular diet textures and thin liquids.  Plan to provide swallow follow up x 1 session to insure diet tolerance.  Anticipate no SLP swallow Tx needs after discharge to home.

## 2017-04-11 NOTE — PLAN OF CARE
Problem: Goal Outcome Summary  Goal: Goal Outcome Summary  Outcome: Improving  A&Ox4. VSS, expect BP's elevated. Up with the assist of 1 and walker. CMS intact. Dressing changed x1, CDI. Multiple loose stools overnight. Patient denies pain. Moseley with adequate urine output. Possible wound vac placement and d/c to TCU  Today. Will continue to monitor.

## 2017-04-11 NOTE — PLAN OF CARE
Problem: Goal Outcome Summary  Goal: Goal Outcome Summary  PT: Patient doing well after R groin wound vac placement; Still able to ambulate 150 feet x 2 with a seated rest and CGA/SBA this pm; min A for up and down stairs; still limited by LE weakness but tolerance for activity improving; No longer needs ARU; Recommend home with assist of family and Home PT. Care Coordinator, ARU liason, and SW aware of current recommendation. Patient in agreement with new plan.

## 2017-04-11 NOTE — PLAN OF CARE
Problem: Goal Outcome Summary  Goal: Goal Outcome Summary  PT:Significant improvement again noted today; Able to ambulate 150 feet with a rolling walker; min/CGA; min A around corners as patient reports feeling more unsure during turning; less feeling of LE's buckling today; no seated rest today; able to tolerate supine strengthening/ROM ex's; transfers with CGA/walker; Wound vac placement today to R groin. 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 Anticipate patient will be able to return to baseline function with continued rehab.

## 2017-04-11 NOTE — PROGRESS NOTES
Care Transition Initial Assessment - RN    Reason For Consult: discharge planning   Met with: Patient.    DATA   Active Problems:    Necrotizing fasciitis (H)    Necrotizing soft tissue infection (H)    Type 2 diabetes mellitus without complication (H)       Cognitive Status: awake, alert and oriented.        Contact information and PCP information verified: Yes    Lives With: other (see comments) (Roommate)  Living Arrangements: house  Quality Of Family Relationships: supportive, involved  Description of Support System: Supportive, Involved   Who is your support system?: Parent(s), Sibling(s), Other (specify) (Friend)   Support Assessment: Adequate family and caregiver support, Adequate social supports     Insurance concerns: No Insurance issues identified    ASSESSMENT  Patient currently receives the following services:  none        Identified issues/concerns regarding health management: new DM, needs teaching and supplies. Needs HHC RN for wound vac dressing changes.  PLAN  Financial costs for the patient include none noted.  Patient given options and choices for discharge HHC, and chose [facility].  Patient/family is agreeable to the plan?  Yes  Patient anticipates discharging to Home w/HHC vs ARU vs TCU.        Patient anticipates needs for home equipment:wound vac and likely walker    Plan/Disposition: Home   Appointments: TBD    Care  (CTS) will continue to follow as needed.

## 2017-04-11 NOTE — PROGRESS NOTES
EDU  D: SW following for discharge planning needs.  SW noted that patient has progressed well enough to discharge home with Home Care.  I: Angela with  Acute Rehab left SW a message and she is aware in the change in the discharge plan.  Care Coordinator made a referral to  Home Care and updated them that the anticipated discharge date is for tomorrow.  A: Patient is alert and oriented X3.  P: SW will continue to follow and assist with finalizing the discharge plan as appropriate.    Kamla King, DAVID

## 2017-04-11 NOTE — PROGRESS NOTES
"General Surgery Progress Note    Hypertensive, multiple formed BM, C diff negtive, tolerating diet, speech and ambulation much improved.  Vitals: Blood pressure (!) 142/93, pulse 73, temperature 98  F (36.7  C), temperature source Oral, resp. rate 16, height 5' 8\" (1.727 m), weight 247 lb 12.8 oz (112.4 kg), SpO2 95 %.  Temperature Temp  Av.2  F (36.8  C)  Min: 97.9  F (36.6  C)  Max: 98.7  F (37.1  C)   I/O last 3 completed shifts:  In: 600 [P.O.:600]  Out: 2150 [Urine:2150]    Wound: clean, vac in place, no surrounding erythema.    Recent Labs   Lab Test  17   0650  04/10/17   0637  17   0656  17   0435   WBC  9.7   --   9.8  11.2*   HGB  9.5*   --   9.5*  9.3*   HCT  28.8*   --   29.3*  28.7*   PLT  402  393  413  423      Recent Labs   Lab Test  17   0650  04/10/17   1550  04/10/17   0637  17   0656   NA  142   --   144  145*   POTASSIUM  3.4  3.8  3.3*  3.4   CHLORIDE  109   --   112*  113*   CO2  23   --   25  24   BUN  18   --   19  23   CR  1.51*   --   1.64*  1.72*     36 year old female with necrotizing fascitis R groin/thigh S/P Procedure(s):  Right groin exploration with excisional debridement of necrotic tissue 3/30/17  Wound vac placement 4/3/17, removed, replaced 17  - Wound vac placed per WOC RN, appreciate your assistance  - Completed course of Daptomycin and Zosyn  - Continue medical management  - Possibly home tomorrow if medically stable  - Home wound vac forms on chart    Sandra Delatorre PA-C  [facility]  [phone]  "

## 2017-04-11 NOTE — PROGRESS NOTES
[facility] Nurse Inpatient Wound Assessment   -[facility] VAC Dressing change     Initial Assessment of wound(s) on pt's:   Rt groin I & D site        Data:   Patient History:     Caro Landis is a 36 year old female who presents with a right groin mass, that she noted last Friday. The mass is painful, has increased from a 1x2 in size to a to a size of 2 x 5 in approximately, and it follows the anterior inguinal fold. The overlying skin is erythematous. She noted a low grade fever last Saturday and some chills. She has not had much of an appetite. She denies history of DM or MRSA infection. Hgb AIC elevated  She shaves her inguinal area. She works in .      Pt has had serial debridement of Rt groin with recent VAC placement  17:  Vac discontinued secondary to candida in emre-wound and groin making seal difficult to                Maintain      Moisture Management:  Urinary Catheter / BRP for [facility]    Catheter secured?   Yes      Current Diet / Nutrition:     Active Diet Order      Moderate Consistent CHO Diet             Jair Assessment and sub scores:   Jair Score  Av  Min: 10  Max: 23     Labs:                                                                   Recent Labs   Lab Test  17   0650   17   0430   17   0530   17   1515   ALBUMIN   --    --   1.8*   --    --    --   1.7*   HGB  9.5*   < >  9.3*   < >   --    < >  10.8*   RBC  3.40*   < >  3.27*   < >   --    < >  3.78*   WBC  9.7   < >  9.3   < >   --    < >  17.6*   PLT  402   < >  339   < >   --    < >  244   INR   --    --    --    --    --    --   1.29*   A1C   --    --    --    --   10.4*   --    --     < > = values in this interval not displayed.     Wound Assessment (location):   Rt groin I & D site      Age of wound/ surgical date: 15    Date [facility] VAC placed::  15    [facility] Wound VAC initiated by:  [facility] Nurse: Yes  MD: No    Any other wound therapies tried prior to [facility] VAC placed? Vac  initially attempted post-op but discontinues secondary on moist candida in emre-wound groin tissue making seal unable to be maintained    Wound Base: 100% red, moist and granular    Specific Dimensions (length x width x depth, in cm) : 18.0cm x 7.0cm x 1.5cm       Tunnelin-3cm from 9-3 o'clock        Palpation of the wound bed:  normal    Slough appearance:  none    Eschar appearance:  none    Periwound Skin: light erythema, satellite lesions, dry today  Temperature warm    Drainage:  . Amount: scant . Color: serosanguinous     Odor: none    Pain:  minimal , pressure  ? Was patient premedicated prior to dressing change? Yes  ? Medication(s) used:  Percocet          Intervention:     Patient's chart evaluated.      Wound was assessed.    Wound Care: was done:   Cleaned MicroKlenz                                                  Antifungal powder to emre-wound skin                                                  Sealed in with skin prep                                                   White sponge to tunnel and undermining                                                   Black sponge x 2 to fill in remainder wound bed                                                   Sealed with Vac drape                                                   Adaptor bridged and and padded on abd wall    Orders  In Epic    Discussed plan of care with Patient and rationale for use          Assessment:       Rt groin I & D site:  No local s/s infection; granulating, emre-wound candida slowly resolving;        Vac dressing appropriate for re-trial          Plan:     Nursing to notify the Provider(s) and re-consult the WOC Nurse if wound(s) deteriorate(s) or if necessary to reevaluate the plan.    Plan of care for wound located on Rt Groin I & D site:     [facility] Wound VAC dressing change by the  WOCN     VAC @ 125mm/hg cont suction    WOC Nurse will return: On Thurs or Friday for Vac change if pt remains in the hospital    Face to face  time: 30 minutes  Procedure VAC dressing change

## 2017-04-11 NOTE — CONSULTS
Received RN consult - wound VAC and diabetes.  We are following pt, see complete nutrition assessment 4/3 and reassessment 4/7.  Pt will be seen again tomorrow for follow-up.    Joy Hand RD  Pager [phone] (M-F)            [phone] (W/E & Hol)

## 2017-04-11 NOTE — PROGRESS NOTES
Home wound vac order is processing, await nutrition consult to fax the OP report and other pertinent documents to [facility]. Ordered the glucometer in preparation for discharge tomorrow. Will follow. Mira Sarmiento RN

## 2017-04-12 ENCOUNTER — APPOINTMENT (OUTPATIENT)
Dept: SPEECH THERAPY | Facility: CLINIC | Age: 37
DRG: 853 | End: 2017-04-12
Payer: COMMERCIAL

## 2017-04-12 ENCOUNTER — APPOINTMENT (OUTPATIENT)
Dept: PHYSICAL THERAPY | Facility: CLINIC | Age: 37
DRG: 853 | End: 2017-04-12
Payer: COMMERCIAL

## 2017-04-12 LAB
ANION GAP SERPL CALCULATED.3IONS-SCNC: 9 MMOL/L (ref 3–14)
BUN SERPL-MCNC: 18 MG/DL (ref 7–30)
CALCIUM SERPL-MCNC: 7.9 MG/DL (ref 8.5–10.1)
CHLORIDE SERPL-SCNC: 108 MMOL/L (ref 94–109)
CO2 SERPL-SCNC: 24 MMOL/L (ref 20–32)
CREAT SERPL-MCNC: 1.56 MG/DL (ref 0.52–1.04)
GFR SERPL CREATININE-BSD FRML MDRD: 37 ML/MIN/1.7M2
GLUCOSE BLDC GLUCOMTR-MCNC: 131 MG/DL (ref 70–99)
GLUCOSE BLDC GLUCOMTR-MCNC: 157 MG/DL (ref 70–99)
GLUCOSE BLDC GLUCOMTR-MCNC: 197 MG/DL (ref 70–99)
GLUCOSE SERPL-MCNC: 140 MG/DL (ref 70–99)
MAGNESIUM SERPL-MCNC: 2 MG/DL (ref 1.6–2.3)
PHOSPHATE SERPL-MCNC: 4.3 MG/DL (ref 2.5–4.5)
POTASSIUM SERPL-SCNC: 3.5 MMOL/L (ref 3.4–5.3)
SODIUM SERPL-SCNC: 141 MMOL/L (ref 133–144)

## 2017-04-12 PROCEDURE — 25000132 ZZH RX MED GY IP 250 OP 250 PS 637: Performed by: SURGERY

## 2017-04-12 PROCEDURE — E2402 NEG PRESS WOUND THERAPY PUMP: HCPCS

## 2017-04-12 PROCEDURE — 92526 ORAL FUNCTION THERAPY: CPT | Mod: GN | Performed by: SPEECH-LANGUAGE PATHOLOGIST

## 2017-04-12 PROCEDURE — 25000128 H RX IP 250 OP 636: Performed by: PHYSICIAN ASSISTANT

## 2017-04-12 PROCEDURE — 97110 THERAPEUTIC EXERCISES: CPT | Mod: GP

## 2017-04-12 PROCEDURE — 25000131 ZZH RX MED GY IP 250 OP 636 PS 637: Performed by: INTERNAL MEDICINE

## 2017-04-12 PROCEDURE — 83735 ASSAY OF MAGNESIUM: CPT | Performed by: PHYSICIAN ASSISTANT

## 2017-04-12 PROCEDURE — 36415 COLL VENOUS BLD VENIPUNCTURE: CPT | Performed by: PHYSICIAN ASSISTANT

## 2017-04-12 PROCEDURE — 25000132 ZZH RX MED GY IP 250 OP 250 PS 637: Performed by: INTERNAL MEDICINE

## 2017-04-12 PROCEDURE — 80048 BASIC METABOLIC PNL TOTAL CA: CPT | Performed by: PHYSICIAN ASSISTANT

## 2017-04-12 PROCEDURE — 97116 GAIT TRAINING THERAPY: CPT | Mod: GP

## 2017-04-12 PROCEDURE — 99232 SBSQ HOSP IP/OBS MODERATE 35: CPT | Performed by: INTERNAL MEDICINE

## 2017-04-12 PROCEDURE — 40000894 ZZH STATISTIC OT IP EVAL DEFER

## 2017-04-12 PROCEDURE — 00000146 ZZHCL STATISTIC GLUCOSE BY METER IP

## 2017-04-12 PROCEDURE — 12000007 ZZH R&B INTERMEDIATE

## 2017-04-12 PROCEDURE — 40000193 ZZH STATISTIC PT WARD VISIT

## 2017-04-12 PROCEDURE — 40000225 ZZH STATISTIC SLP WARD VISIT: Performed by: SPEECH-LANGUAGE PATHOLOGIST

## 2017-04-12 PROCEDURE — 84100 ASSAY OF PHOSPHORUS: CPT | Performed by: PHYSICIAN ASSISTANT

## 2017-04-12 RX ORDER — AMLODIPINE BESYLATE 10 MG/1
10 TABLET ORAL DAILY
Qty: 30 TABLET | Refills: 1 | Status: SHIPPED | OUTPATIENT
Start: 2017-04-13

## 2017-04-12 RX ORDER — OXYCODONE AND ACETAMINOPHEN 5; 325 MG/1; MG/1
1-2 TABLET ORAL EVERY 4 HOURS PRN
Qty: 20 TABLET | Refills: 0 | Status: SHIPPED | OUTPATIENT
Start: 2017-04-12 | End: 2017-05-30

## 2017-04-12 RX ORDER — GLIPIZIDE 5 MG/1
5 TABLET, FILM COATED, EXTENDED RELEASE ORAL
Status: DISCONTINUED | OUTPATIENT
Start: 2017-04-13 | End: 2017-04-12

## 2017-04-12 RX ORDER — METOPROLOL TARTRATE 25 MG/1
25 TABLET, FILM COATED ORAL 2 TIMES DAILY
Qty: 60 TABLET | Refills: 1 | Status: SHIPPED | OUTPATIENT
Start: 2017-04-12

## 2017-04-12 RX ORDER — AMLODIPINE BESYLATE 10 MG/1
10 TABLET ORAL DAILY
Status: DISCONTINUED | OUTPATIENT
Start: 2017-04-13 | End: 2017-04-13 | Stop reason: HOSPADM

## 2017-04-12 RX ORDER — METOPROLOL TARTRATE 25 MG/1
25 TABLET, FILM COATED ORAL 2 TIMES DAILY
Status: DISCONTINUED | OUTPATIENT
Start: 2017-04-12 | End: 2017-04-13 | Stop reason: HOSPADM

## 2017-04-12 RX ORDER — AMLODIPINE BESYLATE 5 MG/1
5 TABLET ORAL ONCE
Status: COMPLETED | OUTPATIENT
Start: 2017-04-12 | End: 2017-04-12

## 2017-04-12 RX ADMIN — METOPROLOL TARTRATE 25 MG: 25 TABLET, FILM COATED ORAL at 12:43

## 2017-04-12 RX ADMIN — MICONAZOLE NITRATE: 2 POWDER TOPICAL at 09:20

## 2017-04-12 RX ADMIN — AMLODIPINE BESYLATE 5 MG: 5 TABLET ORAL at 09:20

## 2017-04-12 RX ADMIN — INSULIN GLARGINE 8 UNITS: 100 INJECTION, SOLUTION SUBCUTANEOUS at 21:28

## 2017-04-12 RX ADMIN — HEPARIN SODIUM 5000 UNITS: 10000 INJECTION, SOLUTION INTRAVENOUS; SUBCUTANEOUS at 21:29

## 2017-04-12 RX ADMIN — METOPROLOL TARTRATE 25 MG: 25 TABLET, FILM COATED ORAL at 21:28

## 2017-04-12 RX ADMIN — HEPARIN SODIUM 5000 UNITS: 10000 INJECTION, SOLUTION INTRAVENOUS; SUBCUTANEOUS at 07:02

## 2017-04-12 RX ADMIN — AMLODIPINE BESYLATE 5 MG: 5 TABLET ORAL at 11:18

## 2017-04-12 RX ADMIN — INSULIN ASPART 2 UNITS: 100 INJECTION, SOLUTION INTRAVENOUS; SUBCUTANEOUS at 12:44

## 2017-04-12 RX ADMIN — HEPARIN SODIUM 5000 UNITS: 10000 INJECTION, SOLUTION INTRAVENOUS; SUBCUTANEOUS at 14:26

## 2017-04-12 RX ADMIN — INSULIN ASPART 1 UNITS: 100 INJECTION, SOLUTION INTRAVENOUS; SUBCUTANEOUS at 16:45

## 2017-04-12 NOTE — PLAN OF CARE
Problem: Goal Outcome Summary  Goal: Goal Outcome Summary  OT: Orders rec'd. Talked with patient about ADL. She reports she'll get a RTS on her own. Plans to sponge bathe and demo's the ability to dress self today. Has no concerns about discharge home. No need for OT eval.

## 2017-04-12 NOTE — PLAN OF CARE
Problem: Goal Outcome Summary  Goal: Goal Outcome Summary  Outcome: Improving  VSS, except elevated BP s. Patient A&Ox4. CMS intact, wound vac dressing reinforced to maintain suction. Patient denies pain. Up with stand by assist, voiding well. One loose BM during shift, remains in enteric precaution. , 131. Patient progressing towards plan of care. Plan to d/c home today with home health aide. Will continue to monitor.

## 2017-04-12 NOTE — PROGRESS NOTES
"Surgery Progress Note    Pt doing well. Wound vac placed at bedside yesterday. Pt tolerated well. She has questions about medications at home for treatment of diabetes.     O: BP (!) 144/94 (BP Location: Left arm)  Pulse 74  Temp 97.4  F (36.3  C) (Oral)  Resp 16  Ht 5' 8\" (1.727 m)  Wt 247 lb 12.8 oz (112.4 kg)  SpO2 97%  BMI 37.68 kg/m2  Right groin: wound vac in place, excellent seal this morning  Buttock: small boil present, no fluid palpable, erythema overlying    A/P: 36yof s/p excision of necrotizing soft tissue infection in right groin. Doing well.   - ok for discharge from surgical standpoint  - she will need home health care for wound vac changes, plan for Q3 day or Mond/Thurs wound vac changes  - follow up with me next Friday in clinic for wound check  - appreciate Hospitalist management of multiple co morbidities    Mary Beth Neal MD  [facility]  [phone]      "

## 2017-04-12 NOTE — PLAN OF CARE
Problem: Goal Outcome Summary  Goal: Goal Outcome Summary        SLP - Pt seen for swallowing Tx in am. Pt sleeping but awoke easily to tactile stimulus. Pt reported no difficulties with regular diet or thin liquids. Pt tolerated solids and thin liquids. No overt s/sx of aspiration exhibited with any textures. Swallow function is WFL and pt is tolerating pre-admission diet. Educated pt that she had met her swallow goals and swallow Tx would be discontinued, and safe swallowing strategies. Pt verbalized understanding. Plan to discontinue SLP swallowing Tx. Recommend continue regular diet and thin liquids with following precautions: upright for all PO intake and alternate textures. Discharge to home. SLP swallowing Tx is not indicated at this time.        Speech Language Therapy Discharge Summary     Reason for therapy discharge:    Discharged to home with home health PT.     Progress towards therapy goal(s). See goals on Care Plan in [facility] electronic health record for goal details.  Goals met     Therapy recommendation(s):    No further therapy is recommended.

## 2017-04-12 NOTE — PLAN OF CARE
Problem: Goal Outcome Summary  Goal: Goal Outcome Summary  Outcome: Improving  Pt A&Ox4, VSS, CMS intact, wound vac placed this am, patent, dressing C,D,I, up with SBA and walker to BR and ambulation in hallway, voiding well, loose BM decreasing, remains in enteric isolation, C-diff negative, IV SL, tolerating diabetic diet, bg 146,173,156 today with sliding scale coverage. Pt educated on glucometer for home, able to check own bg and use meter without difficulty.

## 2017-04-12 NOTE — PROGRESS NOTES
CLINICAL NUTRITION SERVICES - REASSESSMENT NOTE        EVALUATION OF PROGRESS TOWARD GOALS   Diet:  Mod consistent carb. Send Magic Cups and Gelatein between meals.  Intake:  Pt states her taste buds are off and intake isn't great. Eating 50% per flow sheets.   She likes the Magic Cups but not the Gelatein.    Previous Goals:   Diet to be advanced to solids and pt to consume >75% of meals TID and supplements in the next 3-5 days.  Evaluation: Not met    Previous Nutrition Diagnosis:   Inadequate oral intake related to moderate dysphagia and restrictive diet advancement as evidenced by intake fair at best and nectar-thickened liquids likely meeting <50% energy and PRO needs.  Evaluation: No change      CURRENT NUTRITION DIAGNOSIS  Inadequate oral intake related to taste changes as evidenced by eating ~50% of meals    INTERVENTIONS  Recommendations / Nutrition Prescription  Continue current diet    Implementation  Medical Food Supplement - will d/c the Gelatein and replace with more Magic Cups    Goals  Pt to consume 75% of meals and supplements      MONITORING AND EVALUATION:  Progress towards goals will be monitored and evaluated per protocol and Practice Guidelines    Joy Hand RD  Pager [phone] (M-F)            [phone] (W/E & Hol)

## 2017-04-12 NOTE — PLAN OF CARE
Problem: Goal Outcome Summary  Goal: Goal Outcome Summary  Outcome: Improving  [id number] RN: Pt frustrated due to delay in discharge. Up with SBA and walker in room. Denies pain. Wound vac CDI, patent. Nursing to send vac with pt upon discharge tomorrow.

## 2017-04-12 NOTE — PLAN OF CARE
Problem: Goal Outcome Summary  Goal: Goal Outcome Summary  Outcome: Therapy, progress toward functional goals as expected  PT- Per plan established by the Physical Therapist, according to functional mobility the discharge recommendation is home with assist from family and HHPT. IND with bed mobility. SBA with FWW for sit to/from stand with safety cues. Amb 250 ft x 1 with FWW and SBA. Up/down 3 steps x 1 with SEC and HHA with cues for sequencing and assist for wound vac. Pt sitting up in w/c at end of session with all needs in reach and chair alarm on.

## 2017-04-12 NOTE — PROGRESS NOTES
[facility]  Hospitalist Progress Note  Jama Milligan MD  04/12/2017    Assessment & Plan   Caro Landis is a 36 year old female with past medical history of PCOS and hyperlipidemia who was admitted on 3/29/2017 for necrotizing fasciitis of the right groin.  She underwent debridement on March 29 and 30 and wound vac placement on 4/3 and removal 4/4.  She was treated in the ICU for septic shock.  Post operatively she was delirious and psychiatry was consulted.  She was transferred out of ICU on 4/8 to hospitalist service for further care.       Necrotizing fascitis of R groin:   S/p debridement in the OR on 3/29 and 3/30.   CT scan on admission showed extensive gas pattern in R groin suggestive of necrotizing fasciitis   -General Surgery Following  -Wound ostomy following  -Zosyn and daptomysin D/C'd on 4/6  -Wound cultures with Prevotella disiens and light growth corynebacterium  -wong catheter in place to assist with wound healing  -Wound vac placed on 4/10     Delirium:   -Previously required precedex infusion, weaned 4/6 at 15:15  -psychiatry assistance appreciated  -Seroquel d/mary  -QT interval was normal on 4/6  - resolved.    Candida albicans in sputum and urine. Likely insiginficant. Discontinued fluconazole on 4/8     Right groin candidiasis  -continue miconazole powder and wound cares     Acute blood loss anemia  -Hgb ~ 9.5, stable  -transfuse to keep greater than 7     Newly diagnosed DM type II  -hga1c on admission 9.6. Appears to be a new diagnosis to patient and she reports she was never diagnosed with this until this admission.   -high SSI.  Increase Lantus to 8 units  -FSG was 140 this am  -goal BG<180 for optimal healing     PCOS  -has previously been on metformin and spironolactone but was not taking PTA  - Metformin not appropriate at this time given her DONNA     Newly diagnosed HTN  - given persistent elevation and controlled pain, started her on amlodipine 5mg daily on 4/9.   "  - Will increase amlodipine to 10 mg daily.  - will benefit from acei/arb once DONNA resolves and stablizes given underlying DM  - Add metoprolol 25 mg po bid.  -prn hydralazine ordered for SBP >180 and DBP >100     Acute kidney injury   - suspect likely ATN from sepsis/septic shock. Cr. <1 on admission  - Creatinine peaked to 2.47  - Cr down to 1.56 this am.  - UOP was 1340 cc past 24 hours  - Renal US on 4/9 was unremarkable.  - F/u creatinine daily    Loose stools:  Likely from TF. Resolving.   - C. Diff neg x 2     Tobacco Abuse - smoked 1/2 ppd PTA, ready to quit, no cravings, denies need for nicotine replacement  Hypotension, presumed to be due to septic shock - resolved  Acute respiratory failure, post-operatively secondary to acute illness, extubated 4/5 - resolved     Hypernatremia, Hyponatremia - resolved  Redness of the face without apparent angioedema, some swelling of eyelids- resolved         DVT Prophylaxis: Heparin SQ  Diet: Soft Diet with Thin liquids, magic cup supplements  Code Status: Full Code     Disposition:  Probable d/c to home in am.    Jama Milligan MD.   Hospitalist.  [phone], pager.    Interval History   Has no complaints.  Uneventful night.    -Data reviewed today: I reviewed all new labs and imaging over the last 24 hours.    Physical Exam   Heart Rate: 70, Blood pressure (!) 153/97, pulse 75, temperature 97.4  F (36.3  C), temperature source Oral, resp. rate 16, height 1.727 m (5' 8\"), weight 112.4 kg (247 lb 12.8 oz), SpO2 97 %.  Vitals:    04/08/17 0600 04/10/17 0540 04/11/17 0717   Weight: 119 kg (262 lb 5.6 oz) 115.3 kg (254 lb 3.1 oz) 112.4 kg (247 lb 12.8 oz)     Vital Signs with Ranges  Temp:  [97.4  F (36.3  C)-98.2  F (36.8  C)] 97.4  F (36.3  C)  Pulse:  [74-75] 75  Heart Rate:  [70-79] 70  Resp:  [16] 16  BP: (144-162)/(90-98) 153/97  SpO2:  [94 %-97 %] 97 %  I/O's Last 24 hours  I/O last 3 completed shifts:  In: 600 [P.O.:600]  Out: 400 " [Urine:400]    Constitutional: Alert, awake and no apparent distress   Respiratory: Clear to auscultation bilaterally, no wheezing   Cardiovascular: Regular rate and rhythm  GI: soft, NT, ND, normal bowel sounds  Skin: right groin wound with good granulation tissue and no signs of cellulitis in surrounding skin  Other:no LE edema    Medications   All medications I am responsible for were reviewed.    IV fluid REPLACEMENT ONLY         [START ON 4/13/2017] amLODIPine  10 mg Oral Daily     metoprolol  25 mg Oral BID     insulin glargine  8 Units Subcutaneous At Bedtime     insulin aspart  1-7 Units Subcutaneous TID AC     insulin aspart  1-5 Units Subcutaneous At Bedtime     miconazole   Topical BID     heparin  5,000 Units Subcutaneous Q8H        Data     Recent Labs  Lab 04/12/17  0635 04/11/17  0650 04/10/17  1550 04/10/17  0637 04/09/17  0656 04/08/17  0435   WBC  --  9.7  --   --  9.8 11.2*   HGB  --  9.5*  --   --  9.5* 9.3*   MCV  --  85  --   --  86 87   PLT  --  402  --  393 413 423    142  --  144 145* 147*   POTASSIUM 3.5 3.4 3.8 3.3* 3.4 3.4   CHLORIDE 108 109  --  112* 113* 115*   CO2 24 23  --  25 24 23   BUN 18 18  --  19 23 25   CR 1.56* 1.51*  --  1.64* 1.72* 1.72*   ANIONGAP 9 10  --  7 8 9   ROBERT 7.9* 8.1*  --  8.0* 8.0* 8.1*   * 146*  --  154* 144* 161*       No results found for this or any previous visit (from the past 24 hour(s)).

## 2017-04-12 NOTE — DISCHARGE INSTRUCTIONS
Rt groin I & D site:  (dressing last changed on 4-11-17)  [facility] Wound VAC  Change dressing on M-W-F and prn  Vac @ 125mm/hg cont suction  Pre-medicate pt with oral pain meds prior to dressing change    Supplies:   Medium black sponge kit  Antifungal powder  Skin prep  Wound cleanser    Procedure:    1.      Clean wound MicroKlenz   2.     Antifungal powder  (Nystatin Powder or Desenex) to emre-wound skin. Dust off excess   3.     Sealed in with skin prep   4.     Black  sponge to tunnel and undermining   5.     Black sponge x 2 to fill in remainder wound bed   6.     Sealed with Vac drape   7.       Adaptor bridged and and padded on abd wall    Home Care provided by:[facility]  Phone [phone]  Fax#[phone]    Follow up appointment with Dr. Hedrick on Friday, April 14th at 4:15pm for a 4:30pm appointment at your [facility]. [phone]  Follow up with [facility] appointment on Monday @ 2:15pm (This is required for the home care agency to do the dressing changes on the wound vac).  Address: [address] [place] [address], Ground floor rehab services  Phone: [phone]

## 2017-04-12 NOTE — PROGRESS NOTES
Status of Home Wound Vac:  Called x 2 to [place] to see if has needed documents to Christine at [id number] as directed, phone rolls over to other staff Dash, who has escalated this request to [facility] manager and expecting call back shortly. Await return call. Mira Sarmiento RN  Addendum- home vac has been approved, await return call form [facility] to say that they can staff her [facility] needs at discharge.  Mira Sarmiento RN  Addendum-See discharge info for PCP follow-up appointment, PCP will manage DM education. Mira Sarmiento RN    ADDENDUM-[facility] called back to say that patient needed to have an appointment at the [facility] before they can see her, and next available appointment is on Tuesday, April 18th. But appointment can't be secured until orders obtained, so got order from Dr. Milligan for referral, called Negrita at [phone], faxed the orders to [phone]. Called the wound clinic to see if they got the fax, and had to leave a message for Negrita the , who assured me she would hold the appointment for the patient.     AWAIT- 1. Confirmation from the Wound Clinic that the appointment is set up for the 18th 2.Vac change from WOC RN here at [facility] tomorrow.     Mira Sarmiento RN

## 2017-04-13 VITALS
HEART RATE: 68 BPM | HEIGHT: 68 IN | DIASTOLIC BLOOD PRESSURE: 94 MMHG | BODY MASS INDEX: 37.42 KG/M2 | SYSTOLIC BLOOD PRESSURE: 147 MMHG | WEIGHT: 246.91 LBS | RESPIRATION RATE: 16 BRPM | TEMPERATURE: 98.1 F | OXYGEN SATURATION: 97 %

## 2017-04-13 LAB
ANION GAP SERPL CALCULATED.3IONS-SCNC: 9 MMOL/L (ref 3–14)
BUN SERPL-MCNC: 19 MG/DL (ref 7–30)
CALCIUM SERPL-MCNC: 8.3 MG/DL (ref 8.5–10.1)
CHLORIDE SERPL-SCNC: 106 MMOL/L (ref 94–109)
CO2 SERPL-SCNC: 24 MMOL/L (ref 20–32)
CREAT SERPL-MCNC: 1.45 MG/DL (ref 0.52–1.04)
GFR SERPL CREATININE-BSD FRML MDRD: 40 ML/MIN/1.7M2
GLUCOSE BLDC GLUCOMTR-MCNC: 139 MG/DL (ref 70–99)
GLUCOSE BLDC GLUCOMTR-MCNC: 152 MG/DL (ref 70–99)
GLUCOSE SERPL-MCNC: 139 MG/DL (ref 70–99)
MAGNESIUM SERPL-MCNC: 2.1 MG/DL (ref 1.6–2.3)
PHOSPHATE SERPL-MCNC: 4.8 MG/DL (ref 2.5–4.5)
PLATELET # BLD AUTO: 427 10E9/L (ref 150–450)
POTASSIUM SERPL-SCNC: 3.6 MMOL/L (ref 3.4–5.3)
SODIUM SERPL-SCNC: 139 MMOL/L (ref 133–144)

## 2017-04-13 PROCEDURE — 25000128 H RX IP 250 OP 636: Performed by: PHYSICIAN ASSISTANT

## 2017-04-13 PROCEDURE — 00000146 ZZHCL STATISTIC GLUCOSE BY METER IP

## 2017-04-13 PROCEDURE — 85049 AUTOMATED PLATELET COUNT: CPT | Performed by: PHYSICIAN ASSISTANT

## 2017-04-13 PROCEDURE — 36415 COLL VENOUS BLD VENIPUNCTURE: CPT | Performed by: PHYSICIAN ASSISTANT

## 2017-04-13 PROCEDURE — 80048 BASIC METABOLIC PNL TOTAL CA: CPT | Performed by: PHYSICIAN ASSISTANT

## 2017-04-13 PROCEDURE — 97606 NEG PRS WND THER DME>50 SQCM: CPT

## 2017-04-13 PROCEDURE — 99239 HOSP IP/OBS DSCHRG MGMT >30: CPT | Performed by: INTERNAL MEDICINE

## 2017-04-13 PROCEDURE — E2402 NEG PRESS WOUND THERAPY PUMP: HCPCS

## 2017-04-13 PROCEDURE — 25000132 ZZH RX MED GY IP 250 OP 250 PS 637: Performed by: INTERNAL MEDICINE

## 2017-04-13 PROCEDURE — 99211 OFF/OP EST MAY X REQ PHY/QHP: CPT

## 2017-04-13 PROCEDURE — 84100 ASSAY OF PHOSPHORUS: CPT | Performed by: PHYSICIAN ASSISTANT

## 2017-04-13 PROCEDURE — 83735 ASSAY OF MAGNESIUM: CPT | Performed by: PHYSICIAN ASSISTANT

## 2017-04-13 PROCEDURE — 40000902 ZZH STATISTIC WOC PT EDUCATION, 16-30 MIN

## 2017-04-13 PROCEDURE — 99212 OFFICE O/P EST SF 10 MIN: CPT

## 2017-04-13 PROCEDURE — 25000132 ZZH RX MED GY IP 250 OP 250 PS 637: Performed by: SURGERY

## 2017-04-13 RX ADMIN — AMLODIPINE BESYLATE 10 MG: 10 TABLET ORAL at 07:43

## 2017-04-13 RX ADMIN — OXYCODONE HYDROCHLORIDE AND ACETAMINOPHEN 1 TABLET: 5; 325 TABLET ORAL at 07:43

## 2017-04-13 RX ADMIN — HEPARIN SODIUM 5000 UNITS: 10000 INJECTION, SOLUTION INTRAVENOUS; SUBCUTANEOUS at 07:08

## 2017-04-13 RX ADMIN — METOPROLOL TARTRATE 25 MG: 25 TABLET, FILM COATED ORAL at 07:44

## 2017-04-13 RX ADMIN — MICONAZOLE NITRATE: 2 POWDER TOPICAL at 07:44

## 2017-04-13 NOTE — PROGRESS NOTES
"General Surgery Progress Note    Eager for discharge, hypertensive at times, meds adjusted yesterday, wound vac removed due to air leak, minimal pain, tolertaing diet, +BM, UO adequate, ambulating.  Vitals: Blood pressure (!) 147/94, pulse 68, temperature 98.1  F (36.7  C), temperature source Oral, resp. rate 16, height 5' 8\" (1.727 m), weight 246 lb 14.6 oz (112 kg), SpO2 97 %.  Temperature Temp  Av.1  F (36.7  C)  Min: 97.9  F (36.6  C)  Max: 98.3  F (36.8  C)   I/O last 3 completed shifts:  In: 760 [P.O.:760]  Out: -     Wound: vac being placed. Clean wound bed. No erythema or purulent drainage.    36 year old female S/P Procedure(s):  IRRIGATION AND DEBRIDEMENT GROIN  APPLY WOUND VAC, 10 Days Post-Op.  - Wound vac changes M/W/F or every other day due to location and likelihood of leaks  - Creat continues to improve  - Med mngt per hospitialist, appreciate help  - Appreciate help of WOC and care coordinator   - Home when ok with medicine    Sandra Delatorre PA-C  [facility]  [phone]  "

## 2017-04-13 NOTE — PROGRESS NOTES
I called [facility] to confirm they have orders and questions answered. They will see the patient Sunday.

## 2017-04-13 NOTE — PLAN OF CARE
Problem: Goal Outcome Summary  Goal: Goal Outcome Summary  Outcome: Improving  A/OX4,cms intact.Denies pain.Up with SBA to bathroom.Wound vac was alarming and show airleak,so RN from station 33 removed wound vac and changed the dressing.Pt will have new wound vac and dreg to be place by wound nurse this morning before discharge.Nursing to send vac with pt upon discharge today.Will continue to monitor.

## 2017-04-13 NOTE — DISCHARGE SUMMARY
[facility]    Discharge Summary  Hospitalist    Date of Admission:  3/29/2017  Date of Discharge:  4/13/2017 12:52 PM  Discharging Provider: Jama Milligan MD  Date of Service (when I saw the patient): 04/13/17    Discharge Diagnoses   1)   Necrotizing fascitis of R groin, s/p debridement in the OR on 3/29 and 3/30.  2)   Newly diagnosed HTN.  3)   Newly diagnosed DM typr II.  4)   Delirium.  5)   ARF due to ATN.  6)   Diarrhea with negative c diff toxins.  7)   Lytes disturbance resolved.    Past Medical History:   Diagnosis Date     Polycystic disease, ovaries      Type 2 diabetes mellitus without complication (H) 4/8/2017     Past Surgical History:   Procedure Laterality Date     APPENDECTOMY       APPLY WOUND VAC N/A 4/3/2017    Procedure: APPLY WOUND VAC;  Surgeon: Mary Beth Neal MD;  Location: [facility] OR     EXCISE LESION GROIN Right 3/29/2017    Procedure: EXCISE LESION GROIN;  Surgeon: Mary Beth Neal MD;  Location: [facility] OR     EXPLORE GROIN Right 3/30/2017    Procedure: EXPLORE GROIN;  Surgeon: Mary Beth Neal MD;  Location: [facility] OR     IRRIGATION AND DEBRIDEMENT GROIN N/A 3/30/2017    Procedure: IRRIGATION AND DEBRIDEMENT GROIN;  Surgeon: Mary Beth Neal MD;  Location: [facility] OR     IRRIGATION AND DEBRIDEMENT GROIN Right 4/3/2017    Procedure: IRRIGATION AND DEBRIDEMENT GROIN;  Surgeon: Mary Beth Neal MD;  Location: [facility] OR     History of Present Illness   Lina Landis is a 36 year old female with past medical history of PCOS and hyperlipidemia who presented on 3/29/2017 presented with right groin pain and swelling. She noted pain in her right groin 6 days PTA.  No trauma to the area.  She noticed a red bump on the right upper inner thigh 5 days PTA which has progressed to a large area of swelling which was exquisitely tender.  No prior history of similar problems.  She denied history of diabetes but her blood sugar is elevated to 356 today. Diagnosed with necrotizing fasciitis of the  right groin at admit.       She underwent debridement on March 29 and 30 and wound vac placement on 4/3 and removal 4/4. She was treated in the ICU for septic shock. Post operatively she was delirious and psychiatry was consulted. She was transferred out of ICU on 4/8 to hospitalist service for further care.     Hospital Course   Caro Landis was admitted on 3/29/2017.  The following problems were addressed during her hospitalization:    1)   Necrotizing fascitis of R groin:    CT scan on admission showed extensive gas pattern in R groin suggestive of necrotizing fasciitis.  She was afebrile but had leukocytosis.  She underwent debridement on March 29 and 30 and wound vac placement on 4/3 and removal 4/4.  She required intubation on 3/29 and extubated 4/5/17.   She was treated in the ICU for septic shock with IVF and pressors.  Wound cultures grew Prevotella disiens and light growth corynebacterium.  Received Zosyn and daptomysin from 3/29 to 4/6.  Moseley catheter in place to assist with wound healing.  Last wound vac placed on 4/12/17 with which she will be d/mary.  Future care of the wound vac is per general surgery and home care RN.      2)   Delirium:   Due to critical care illness.  Seen by psychiatry and was treated w/ Seroquel.  Her MS cleared following treatment of the right groin necrotizing fasciitis.  Seroquel d/mary.  MS was back to her baseline at d/c.     3)   Acute postop resp failure:   She required intubation to undergo rt groin debridements.  She was able to extubate w/o any difficulties.  Candida albicans in sputum and urine likely insiginficant.  She received  fluconazole only for a day, 4/7 to 4/8/17.      4)   Right groin candidiasis:    Continue miconazole powder and wound cares      5)   Acute blood loss anemia:   She did not required PRBC.  Hgb stable at 9.0+ grams.      6)   Newly diagnosed DM type II:    Hga1c on admission was 9.6.  Glucose was fairly well controlled at d/c.  I will d/c her  to home with Lantus 8 units daily.  She will need a formal diabetic education at her PCP clinic.  She will be a good candidate for metformin if her creatinine returns to normal range.       7)   PCOS:   Has previously been on metformin and spironolactone but was not taking PTA.  Metformin not appropriate at this time given her DONNA.      8)   Newly diagnosed HTN:   Will d/c her to home with amlodipine 10 mg daily and metoprolol 25 mg po bid.      9)    Acute kidney injury:  Due to ATN from sepsis/septic shock.  Creatinine peaked at 2.47.  Renal US on 4/9 was unremarkable.  UA was bland.  She responded well to IVF and pressors.  UOP has been good throughout this hospital course.  Creatinine is 1.45.     10)   Diarrhea:   Likely due to tube feed.  C. Diff toxins x 2 was negative.  Tube feed d/mary.  Resolved.      11)   Hypernatremia:  Likely due to intravascular volume depletion.  Resolved following IVF hydration.          Pending Results   These results will be followed up by PCP  Unresulted Labs Ordered in the Past 30 Days of this Admission     Date and Time Order Name Status Description    3/29/2017 1333 Fungus Culture, non-blood Preliminary     3/29/2017 1327 Fungus Culture, non-blood Preliminary     3/29/2017 1324 Fungus Culture, non-blood Preliminary           Code Status   Full       Primary Care Physician   Barbara Allan    Physical Exam   Temp: 98.1  F (36.7  C) Temp src: Oral BP: (!) 147/94 Pulse: 68 Heart Rate: 68 Resp: 16 SpO2: 97 % O2 Device: None (Room air)    Vitals:    04/10/17 0540 04/11/17 0717 04/13/17 0635   Weight: 115.3 kg (254 lb 3.1 oz) 112.4 kg (247 lb 12.8 oz) 112 kg (246 lb 14.6 oz)     Vital Signs with Ranges  Temp:  [97.9  F (36.6  C)-98.3  F (36.8  C)] 98.1  F (36.7  C)  Pulse:  [68-75] 68  Heart Rate:  [68-78] 68  Resp:  [16-18] 16  BP: (124-156)/(82-98) 147/94  SpO2:  [93 %-98 %] 97 %  I/O last 3 completed shifts:  In: 760 [P.O.:760]  Out: -     General: aao x 3, NAD.  HEENT:  NC/AT,  PERRL, EOMI, neck supple, no thyromegaly, op clear, mmm.  CVS:  NL s 1 and s2, no m/r/g.  Lungs:  CTA B/L.   Abd:  Soft, + bs, NT, no rebound or gaurding, no fluid shift.  Ext:  No c/c.  Lymph:  No edema.  Neuro:  Nonfocal.  Musculoskeletal: No calf tenderness to palpation.    Skin:  Right groin wound dressing and wound vac noted.  Psychiatry:  Mood and affect appropriate.    Discharge Disposition   Discharged to home  Condition at discharge: Stable    Consultations This Hospital Stay   PHARMACY TO DOSE VANCO  HOSPITALIST IP CONSULT  PHARMACY IP CONSULT  PHARMACY TO DOSE VANCO  INTENSIVIST IP CONSULT  PHARMACY TO DOSE VANCO  SOCIAL WORK IP CONSULT  WOUND OSTOMY CONTINENCE NURSE  IP CONSULT  NUTRITION SERVICES ADULT IP CONSULT  WOUND OSTOMY CONTINENCE NURSE  IP CONSULT  WOUND OSTOMY CONTINENCE NURSE  IP CONSULT  PHARMACY IP CONSULT  SWALLOW EVAL SPEECH PATH AT BEDSIDE IP CONSULT  PSYCHIATRY IP CONSULT  PHYSICAL THERAPY ADULT IP CONSULT  WOUND OSTOMY CONTINENCE NURSE  IP CONSULT  OCCUPATIONAL THERAPY ADULT IP CONSULT  NUTRITION SERVICES ADULT IP CONSULT    Time Spent on this Encounter   IJama, personally saw the patient today and spent greater than 30 minutes discharging this patient.    Discharge Orders     Home Care PT Referral for Hospital Discharge     Home care nursing referral     Wound Care Referral     Reason for your hospital stay   Infection of your right groin, diabetes, high blood pressure     Activity   Your activity upon discharge: activity as tolerated. No heavy lifting > 20 lbs or strenuous exercise x 2-3 weeks. No driving or alcohol while on pain meds.     Wound care and dressings   Instructions to care for your wound at home: Wound vac changes Monday, Wednesday and Friday     Discharge Equipment: Wound Vac   Negative Pressure Wound Therapy to r groin wound with continuous suction.     Cleanse wound with saline or wound cleanser. Dressing change 3 times per week. Change canister weekly and  when full.     Supplies: black foam    Teach pt/family how to remove dressing and apply wet to damp dressing, in the event that dressing leaks or machine malfunctions.  Consult [facility] nurse.      Follow-up for wound care in 2 weeks with Dr. Neal.     Follow-up and recommended labs and tests    Follow up with me,  Mary Beth Neal, Friday, April 21st for wound check. Please call [phone] to schedule this appt. Please bring wound vac supplies with you if possible.     Diet   Follow this diet upon discharge: moderate carbohydrate diet       Discharge Medications   Current Discharge Medication List      START taking these medications    Details   oxyCODONE-acetaminophen (PERCOCET) 5-325 MG per tablet Take 1-2 tablets by mouth every 4 hours as needed for moderate to severe pain  Qty: 20 tablet, Refills: 0    Associated Diagnoses: Necrotizing fasciitis (H)      metoprolol (LOPRESSOR) 25 MG tablet Take 1 tablet (25 mg) by mouth 2 times daily  Qty: 60 tablet, Refills: 1    Associated Diagnoses: Essential hypertension      amLODIPine (NORVASC) 10 MG tablet Take 1 tablet (10 mg) by mouth daily  Qty: 30 tablet, Refills: 1    Associated Diagnoses: Essential hypertension      insulin glargine (LANTUS SOLOSTAR) 100 UNIT/ML injection Inject 8 Units Subcutaneous At Bedtime Lantus Solostar Pen  Qty: 15 mL, Refills: 0    Associated Diagnoses: Type 2 diabetes mellitus without complication, with long-term current use of insulin (H)      !! order for DME Equipment being ordered: Walker Wheels () and Walker ()  Treatment Diagnosis: impaired gait stability  Qty: 1 each, Refills: 0    Associated Diagnoses: Necrotizing soft tissue infection (H)      !! order for DME Equipment being ordered: Other: Glucometer  Treatment Diagnosis: Diabetes  Needs testing strips for testing three times daily and control solution  Qty: 1 Device, Refills: 0    Associated Diagnoses: Type 2 diabetes mellitus without complication, with long-term  current use of insulin (H)       !! - Potential duplicate medications found. Please discuss with provider.      STOP taking these medications       Acetaminophen (TYLENOL PO) Comments:   Reason for Stopping:         Naproxen Sodium (ALEVE PO) Comments:   Reason for Stopping:             Allergies   Allergies   Allergen Reactions     Cats      Shellfish-Derived Products

## 2017-04-13 NOTE — PLAN OF CARE
Problem: Goal Outcome Summary  Goal: Goal Outcome Summary  PT: Patient discharged to home with assist from family and Home PT. Goals met     Physical Therapy Discharge Summary     Reason for therapy discharge:    Discharged to home with home therapy.     Progress towards therapy goal(s). See goals on Care Plan in [facility] electronic health record for goal details.  Goals met     Therapy recommendation(s):    Continued therapy is recommended.  Rationale/Recommendations:  Home PT to maximize strength, activity tolerance, and independence with functional mobility.

## 2017-04-13 NOTE — PROGRESS NOTES
Reviewed discharge with pt & mother.  Verbalized understanding.  Pt aware how to & when to check BSG.  Pt aware how to use lantus pen.  Pt aware of MD appt on Friday & Monday.  Pt also educated per wound care on what to do if wound vac malfunctions.  Pt to dc home with mother.

## 2017-04-13 NOTE — PROGRESS NOTES
[facility] Nurse Inpatient Wound Assessment   -KCI VAC Dressing change     Initial Assessment of wound(s) on pt's:   Rt groin I & D site        Data:   Patient History:     Caro Landis is a 36 year old female who presents with a right groin mass, that she noted last Friday. The mass is painful, has increased from a 1x2 in size to a to a size of 2 x 5 in approximately, and it follows the anterior inguinal fold. The overlying skin is erythematous. She noted a low grade fever last Saturday and some chills. She has not had much of an appetite. She denies history of DM or MRSA infection. Hgb AIC elevated  She shaves her inguinal area. She works in .      Pt has had serial debridement of Rt groin with recent VAC placement  17:  Vac discontinued secondary to candida in emre-wound and groin making seal difficult to                Maintain  17: Vac removed during night secondary to inability to maintain seal.  Moist NS gauze dressing applied to wound bed.  Plan reviewed with SS , surgical PA-C today and Patient. Pt wishes to have Home Vac applied today. Instructed on removing sponge and re-applying moist NS gauze dressing if seal fails.  Pt is comfortable with plan      Moisture Management:  BRP now        Current Diet / Nutrition:     Active Diet Order      Moderate Consistent CHO Diet             Jair Assessment and sub scores:   Jair Score  Av  Min: 10  Max: 23       Labs:   Recent Labs   Lab Test  17   0650   17   0430   17   0530   17   1515   ALBUMIN   --    --   1.8*   --    --    --   1.7*   HGB  9.5*   < >  9.3*   < >   --    < >  10.8*   INR   --    --    --    --    --    --   1.29*   WBC  9.7   < >  9.3   < >   --    < >  17.6*   A1C   --    --    --    --   10.4*   --    --     < > = values in this interval not displayed.                                                                           Wound Assessment (location):   Rt groin I & D  site      Age of wound/ surgical date: 15    Date KCI VAC placed::  15    KCI Wound VAC initiated by:  WO Nurse: Yes  MD: No    Any other wound therapies tried prior to KCI VAC placed? Vac initially attempted post-op but discontinues secondary on moist candida in emre-wound groin tissue making seal unable to be maintained. Vac lasted 2 days since reapplied on 17.          Seal leakage during the night. Voiding may now be impacting seal     Wound Base: 100% red, moist and granular    Specific Dimensions (length x width x depth, in cm) : 18.0cm x 7.0cm x 1.5cm       Tunnelin-3cm from 9-3 o'clock        Palpation of the wound bed:  normal    Slough appearance:  none    Eschar appearance:  none    Periwound Skin: light erythema, satellite lesions, dryer today  Temperature warm    Drainage:  . Amount: scant . Color: serosanguinous     Odor: none    Pain:  minimal , pressure  ? Was patient premedicated prior to dressing change? Yes  ? Medication(s) used:  Percocet          Intervention:     Patient's chart evaluated.      Wound was assessed.    Wound Care: was done:   Cleaned MicroKlenz                                                  Antifungal powder to emre-wound skin                                                  Sealed in with skin prep                                                    Black sponge x 3 to fill in remainder wound bed                                                   Sealed with Vac drape                                                   Adaptor bridged and and padded on abd wall    Orders  In Epic    Discussed plan of care with Patient and rationale for use          Assessment:       Rt groin I & D site:  No local s/s infection; granulating, emre-wound candida slowly resolving;        Vac dressing appropriate for home.          Plan:     Nursing to notify the Provider(s) and re-consult the WO Nurse if wound(s) deteriorate(s) or if necessary to reevaluate the plan.    Plan of  care for wound located on Rt Groin I & D site:     VAC @ 125mm/hg cont suction     Reviewed discharge instructions with patient, including management of Home VAC    Face to face time: 30 minutes  Procedure VAC dressing change

## 2017-04-13 NOTE — PROGRESS NOTES
WOC RN NOTE: Wound vac Right groin    Asked to assist staff RN's with wound vac dressing which has been alarming for 2 hours. Chart reviewed, pt seen. Dressing was loose at inner aspect near pubic area. ZI opted to remove the entire dressing and place kerlix moistened with NS and covered with ABD's over the area. Antifungal powder and skin prep applied to emre-wound skin and skin prepped.Per SW note, looks as if pt is to be discharged later today and F/U with [facility] next week. Will have my WOC partner see pt in am and re-eval the situation with pt and care coordinator. Face to face time spent--20 minutes,

## 2017-04-14 ENCOUNTER — TELEPHONE (OUTPATIENT)
Dept: SURGERY | Facility: CLINIC | Age: 37
End: 2017-04-14

## 2017-04-14 NOTE — TELEPHONE ENCOUNTER
Called Christine back with [facility], wanted to know who is providing VAC changes. Per IP RN, [facility] is scheduled to start this on Sunday. Let Christine know this.    Bria Bose RN BSN

## 2017-04-14 NOTE — TELEPHONE ENCOUNTER
Name of caller: caregiver    Reason for Call:  Questions as to who will do home care or clinical care    Surgeon:  Dr. Neal    Recent Surgery:  Yes.    If yes, when & what type:  I & D 04/03/17      Best phone number to reach pt at is: [phone]  Ext: [phone]  Ok to leave a message with medical info? Yes.    Pharmacy preferred (if calling for a refill): na

## 2017-04-21 ENCOUNTER — OFFICE VISIT (OUTPATIENT)
Dept: SURGERY | Facility: CLINIC | Age: 37
End: 2017-04-21
Payer: COMMERCIAL

## 2017-04-21 DIAGNOSIS — L02.92 BOIL: ICD-10-CM

## 2017-04-21 DIAGNOSIS — Z09 SURGERY FOLLOW-UP EXAMINATION: Primary | ICD-10-CM

## 2017-04-21 PROCEDURE — 99024 POSTOP FOLLOW-UP VISIT: CPT | Performed by: SURGERY

## 2017-04-21 PROCEDURE — 10060 I&D ABSCESS SIMPLE/SINGLE: CPT | Performed by: SURGERY

## 2017-04-21 NOTE — PROGRESS NOTES
Surgery Postoperative Note    S: Ms. Landis returns for follow up of her right groin wound. She had originally presented with a necrotizing soft tissue infection. She has been home for 1 week with a wound vac in place. She is having home health RNs change the vac QOD and sees a wound care clinic once per week. She has had trouble with maintaining a seal frequently and has had the wound care RNs out to assist with this. She has minimal pain. She is otherwise doing well. No fevers. She has a painful, raised area on her right upper buttock that she would like evaluated.     Abdomen:right groin incision: healthy granulation tissue at base. Wound is very clean. Surrounding skin is much improved from prior. Wound vac placed with black granufoam sponge in wound and bridging onto upper abdomen.     Buttock: right upper buttock near gluteal cleft - 1.5cm raised lesion with fluctuance present, tender to palpation.     Procedure: 1% lidocaine was injected into the skin surrounding this lesion. The area was prepped with chlorhexidine. A vertically oriented incision was then made with an 11blade. Purulent fluid was evacuated. The wound was probed and widely open with no further drainage. Pressure was held for hemostasis. A [id number] was placed over the incision and secured with tape.       A/P  Caro Landis is recovering from a necrotizing soft tissue infection requiring multiple trips to the operating room for debridement. She is doing remarkably well. The wound is healing great.   - continue wound vac  - follow up with me in 2 weeks to assess healing  - remove outer dressing from buttocks wound tomorrow, replace if needed for drainage.     Thank you for the opportunity to help in her care.    Mary Beth Neal  [facility]  [phone]    Please route or send letter to:  Primary Care Provider (PCP) and Referring Provider

## 2017-04-21 NOTE — MR AVS SNAPSHOT
"              After Visit Summary   2017    Caro Landis    MRN: [id number]           Patient Information     Date Of Birth          1980        Visit Information        Provider Department      2017 3:00 PM Mary Beth Neal MD [facility] General Surgery      Today's Diagnoses     Surgery follow-up examination    -  1       Follow-ups after your visit        Who to contact     If you have questions or need follow up information about today's clinic visit or your schedule please contact [facility] directly at [phone].  Normal or non-critical lab and imaging results will be communicated to you by Personal Capitalhart, letter or phone within 4 business days after the clinic has received the results. If you do not hear from us within 7 days, please contact the clinic through Personal Capitalhart or phone. If you have a critical or abnormal lab result, we will notify you by phone as soon as possible.  Submit refill requests through SpanDeX or call your pharmacy and they will forward the refill request to us. Please allow 3 business days for your refill to be completed.          Additional Information About Your Visit        MyChart Information     SpanDeX lets you send messages to your doctor, view your test results, renew your prescriptions, schedule appointments and more. To sign up, go to [email or web address] . Click on \"Log in\" on the left side of the screen, which will take you to the Welcome page. Then click on \"Sign up Now\" on the right side of the page.     You will be asked to enter the access code listed below, as well as some personal information. Please follow the directions to create your username and password.     Your access code is: [email or web address]  Expires: 2017 10:06 AM     Your access code will  in 90 days. If you need help or a new code, please call your [place] clinic or [phone].        Care EveryWhere ID     This is your " Care EveryWhere ID. This could be used by other organizations to access your [place] medical records  [id number]         Blood Pressure from Last 3 Encounters:   04/13/17 (!) 147/94    Weight from Last 3 Encounters:   04/13/17 246 lb 14.6 oz (112 kg)              Today, you had the following     No orders found for display       Primary Care Provider Office Phone # Fax #    Barbara MENA [facility] [phone]       [address] [address] [place] [address] [place] [address]        Thank you!     Thank you for choosing [facility]  for your care. Our goal is always to provide you with excellent care. Hearing back from our patients is one way we can continue to improve our services. Please take a few minutes to complete the written survey that you may receive in the mail after your visit with us. Thank you!             Your Updated Medication List - Protect others around you: Learn how to safely use, store and throw away your medicines at www.disposemymeds.org.          This list is accurate as of: 4/21/17  3:32 PM.  Always use your most recent med list.                   Brand Name Dispense Instructions for use    amLODIPine 10 MG tablet    NORVASC    30 tablet    Take 1 tablet (10 mg) by mouth daily       insulin glargine 100 UNIT/ML injection    LANTUS SOLOSTAR    15 mL    Inject 8 Units Subcutaneous At Bedtime Lantus Solostar Pen       metoprolol 25 MG tablet    LOPRESSOR    60 tablet    Take 1 tablet (25 mg) by mouth 2 times daily       * order for DME     1 each    Equipment being ordered: Walker Wheels () and Walker () Treatment Diagnosis: impaired gait stability       * order for DME     1 Device    Equipment being ordered: Other: Glucometer Treatment Diagnosis: Diabetes Needs testing strips for testing three times daily and control solution       oxyCODONE-acetaminophen 5-325 MG per tablet    PERCOCET    20 tablet    Take 1-2 tablets by mouth every 4 hours as  needed for moderate to severe pain       * Notice:  This list has 2 medication(s) that are the same as other medications prescribed for you. Read the directions carefully, and ask your doctor or other care provider to review them with you.

## 2017-04-21 NOTE — LETTER
2017    Surgery Postoperative Note    RE:  Caro Landis-:  [birth year]     S: Ms. Landis returns for follow up of her right groin wound. She had originally presented with a necrotizing soft tissue infection. She has been home for 1 week with a wound vac in place. She is having home health RNs change the vac QOD and sees a wound care clinic once per week. She has had trouble with maintaining a seal frequently and has had the wound care RNs out to assist with this. She has minimal pain. She is otherwise doing well. No fevers. She has a painful, raised area on her right upper buttock that she would like evaluated.      Abdomen:right groin incision: healthy granulation tissue at base. Wound is very clean. Surrounding skin is much improved from prior. Wound vac placed with black granufoam sponge in wound and bridging onto upper abdomen.      Buttock: right upper buttock near gluteal cleft - 1.5cm raised lesion with fluctuance present, tender to palpation.      Procedure: 1% lidocaine was injected into the skin surrounding this lesion. The area was prepped with chlorhexidine. A vertically oriented incision was then made with an 11blade. Purulent fluid was evacuated. The wound was probed and widely open with no further drainage. Pressure was held for hemostasis. A [id number] was placed over the incision and secured with tape.       A/P  Caro Landis is recovering from a necrotizing soft tissue infection requiring multiple trips to the operating room for debridement. She is doing remarkably well. The wound is healing great.   - continue wound vac  - follow up with me in 2 weeks to assess healing  - remove outer dressing from buttocks wound tomorrow, replace if needed for drainage.      Thank you for the opportunity to help in her care.     Mary Beth Neal  [facility]  [phone]

## 2017-04-24 DIAGNOSIS — B37.9 CANDIDA INFECTION: Primary | ICD-10-CM

## 2017-04-24 RX ORDER — FLUCONAZOLE 100 MG/1
150 TABLET ORAL DAILY
Qty: 15 TABLET | Refills: 0 | Status: SHIPPED | OUTPATIENT
Start: 2017-04-24 | End: 2017-05-04

## 2017-04-24 NOTE — TELEPHONE ENCOUNTER
Patient is already using Nystatin powder, Wound clinic recommending Diflucan to help with yeast infection. Routed to Dr. Neal to put order in and sign.    Bria Bose RN BSN

## 2017-04-26 LAB
FUNGUS SPEC CULT: NORMAL
Lab: NORMAL
MICRO REPORT STATUS: NORMAL
SPECIMEN SOURCE: NORMAL

## 2017-05-05 ENCOUNTER — OFFICE VISIT (OUTPATIENT)
Dept: SURGERY | Facility: CLINIC | Age: 37
End: 2017-05-05
Payer: COMMERCIAL

## 2017-05-05 DIAGNOSIS — Z09 SURGERY FOLLOW-UP: Primary | ICD-10-CM

## 2017-05-05 PROCEDURE — 99024 POSTOP FOLLOW-UP VISIT: CPT | Performed by: SURGERY

## 2017-05-05 NOTE — LETTER
May 5, 2017    Surgery Postoperative Note    RE:  Caro Landis-:  [birth year]     S: Caro returns today for recheck of her right groin wound. She had been in a wound vac until Monday. She was seen at the Wound Care Clinic and placed in a superficial dressing. She had an excision of necrotic tissue due to necrotizing soft tissue infection on 4/3/2017. She is overall recovering well.      Abdomen: right groin: Wound size much smaller than prior. Flush with skin. Granulation tissue present at base of wound. Wet to dry kerlix dressing placed.       A/P  Caro Landis is recovering from an excisional debridement of right groin necrotizing infection. Continue excellent wound cares per Wound Clinic. Follow up in 2 weeks. Sooner with any questions or concerns.      Thank you for the opportunity to help in her care.     Mary Beth Neal  [facility]  [phone]

## 2017-05-05 NOTE — PROGRESS NOTES
Surgery Postoperative Note    S: Caro returns today for recheck of her right groin wound. She had been in a wound vac until Monday. She was seen at the Wound Care Clinic and placed in a superficial dressing. She had an excision of necrotic tissue due to necrotizing soft tissue infection on 4/3/2017. She is overall recovering well.     Abdomen: right groin:  Wound size much smaller than prior. Flush with skin. Granulation tissue present at base of wound. Wet to dry kerlix dressing placed.        A/P  Caro Landis is recovering from an excisional debridement of right groin necrotizing infection. Continue excellent wound cares per Wound Clinic. Follow up in 2 weeks. Sooner with any questions or concerns.     Thank you for the opportunity to help in her care.    Mary Beth Neal  [facility]  [phone]    Please route or send letter to:  Primary Care Provider (PCP) and Referring Provider

## 2017-05-05 NOTE — MR AVS SNAPSHOT
"              After Visit Summary   2017    Caro Landis    MRN: [id number]           Patient Information     Date Of Birth          1980        Visit Information        Provider Department      2017 1:00 PM Mary Beth Neal MD [facility] General Surgery      Today's Diagnoses     Surgery follow-up    -  1       Follow-ups after your visit        Who to contact     If you have questions or need follow up information about today's clinic visit or your schedule please contact [facility] directly at [phone].  Normal or non-critical lab and imaging results will be communicated to you by Uber.comhart, letter or phone within 4 business days after the clinic has received the results. If you do not hear from us within 7 days, please contact the clinic through Uber.comhart or phone. If you have a critical or abnormal lab result, we will notify you by phone as soon as possible.  Submit refill requests through Green A or call your pharmacy and they will forward the refill request to us. Please allow 3 business days for your refill to be completed.          Additional Information About Your Visit        MyChart Information     Green A lets you send messages to your doctor, view your test results, renew your prescriptions, schedule appointments and more. To sign up, go to [email or web address] . Click on \"Log in\" on the left side of the screen, which will take you to the Welcome page. Then click on \"Sign up Now\" on the right side of the page.     You will be asked to enter the access code listed below, as well as some personal information. Please follow the directions to create your username and password.     Your access code is: [email or web address]  Expires: 2017 10:06 AM     Your access code will  in 90 days. If you need help or a new code, please call your [place] clinic or [phone].        Care EveryWhere ID     This is your Care EveryWhere " ID. This could be used by other organizations to access your [place] medical records  [id number]         Blood Pressure from Last 3 Encounters:   04/13/17 (!) 147/94    Weight from Last 3 Encounters:   04/13/17 246 lb 14.6 oz (112 kg)              Today, you had the following     No orders found for display       Primary Care Provider Office Phone # Fax #    Barbara MENA [facility] [phone]       [facility] [place] [address] [place] [address]        Thank you!     Thank you for choosing [facility]  for your care. Our goal is always to provide you with excellent care. Hearing back from our patients is one way we can continue to improve our services. Please take a few minutes to complete the written survey that you may receive in the mail after your visit with us. Thank you!             Your Updated Medication List - Protect others around you: Learn how to safely use, store and throw away your medicines at www.disposemymeds.org.          This list is accurate as of: 5/5/17  1:25 PM.  Always use your most recent med list.                   Brand Name Dispense Instructions for use    amLODIPine 10 MG tablet    NORVASC    30 tablet    Take 1 tablet (10 mg) by mouth daily       insulin glargine 100 UNIT/ML injection    LANTUS SOLOSTAR    15 mL    Inject 8 Units Subcutaneous At Bedtime Lantus Solostar Pen       metoprolol 25 MG tablet    LOPRESSOR    60 tablet    Take 1 tablet (25 mg) by mouth 2 times daily       * order for DME     1 each    Equipment being ordered: Walker Wheels () and Walker () Treatment Diagnosis: impaired gait stability       * order for DME     1 Device    Equipment being ordered: Other: Glucometer Treatment Diagnosis: Diabetes Needs testing strips for testing three times daily and control solution       oxyCODONE-acetaminophen 5-325 MG per tablet    PERCOCET    20 tablet    Take 1-2 tablets by mouth every 4 hours as needed for moderate to  severe pain       * Notice:  This list has 2 medication(s) that are the same as other medications prescribed for you. Read the directions carefully, and ask your doctor or other care provider to review them with you.

## 2017-05-23 ENCOUNTER — OFFICE VISIT (OUTPATIENT)
Dept: SURGERY | Facility: CLINIC | Age: 37
End: 2017-05-23
Payer: COMMERCIAL

## 2017-05-23 DIAGNOSIS — Z09 SURGERY FOLLOW-UP EXAMINATION: Primary | ICD-10-CM

## 2017-05-23 PROCEDURE — 99024 POSTOP FOLLOW-UP VISIT: CPT | Performed by: SURGERY

## 2017-05-23 NOTE — PROGRESS NOTES
Surgery Postoperative Note    S: Elvia returns for a wound check of her right groin following an extensive excisional debridement for necrotizing soft tissue infection in April. She is doing well.     Abdomen: right groin - incision well healed, erythema with satellite lesions at lateral and medial aspect of incision c/w candidiasis      A/P  Caro Landis is recovering from an excisional biopsy following necrotizing soft tissue infection. I advised her to slowly return to regular activity. I expect she will make a complete recovery without issues. Continue excellent wound cares. Continue topical antifungal - apply BID for 4 days then daily until erythema resolved. Call to schedule appt if erythema is not improving in 2 weeks, otherwise follow up PRN.     Thank you for the opportunity to help in her care.    Mary Beth Neal  [facility]  [phone]    Please route or send letter to:  Primary Care Provider (PCP) and Referring Provider

## 2017-05-23 NOTE — MR AVS SNAPSHOT
"              After Visit Summary   2017    Caro Landis    MRN: [id number]           Patient Information     Date Of Birth          1980        Visit Information        Provider Department      2017 10:00 AM Mary Beth Neal MD [facility] General Surgery      Today's Diagnoses     Surgery follow-up examination    -  1       Follow-ups after your visit        Who to contact     If you have questions or need follow up information about today's clinic visit or your schedule please contact [facility] directly at [phone].  Normal or non-critical lab and imaging results will be communicated to you by [facility], letter or phone within 4 business days after the clinic has received the results. If you do not hear from us within 7 days, please contact the clinic through [facility] or phone. If you have a critical or abnormal lab result, we will notify you by phone as soon as possible.  Submit refill requests through [employer] or call your pharmacy and they will forward the refill request to us. Please allow 3 business days for your refill to be completed.          Additional Information About Your Visit        MyChart Information     [employer] lets you send messages to your doctor, view your test results, renew your prescriptions, schedule appointments and more. To sign up, go to [email or web address] . Click on \"Log in\" on the left side of the screen, which will take you to the Welcome page. Then click on \"Sign up Now\" on the right side of the page.     You will be asked to enter the access code listed below, as well as some personal information. Please follow the directions to create your username and password.     Your access code is: [email or web address]  Expires: 2017 10:06 AM     Your access code will  in 90 days. If you need help or a new code, please call your [place] clinic or [phone].        Care EveryWhere ID     This is your " Care EveryWhere ID. This could be used by other organizations to access your [facility] medical records  [id number]         Blood Pressure from Last 3 Encounters:   04/13/17 (!) 147/94    Weight from Last 3 Encounters:   04/13/17 246 lb 14.6 oz (112 kg)              Today, you had the following     No orders found for display       Primary Care Provider Office Phone # Fax #    Barbara MENA [facility] [phone]       [address] [place] [address] [place] [address]        Thank you!     Thank you for choosing [facility]  for your care. Our goal is always to provide you with excellent care. Hearing back from our patients is one way we can continue to improve our services. Please take a few minutes to complete the written survey that you may receive in the mail after your visit with us. Thank you!             Your Updated Medication List - Protect others around you: Learn how to safely use, store and throw away your medicines at www.disposemymeds.org.          This list is accurate as of: 5/23/17 11:09 AM.  Always use your most recent med list.                   Brand Name Dispense Instructions for use    amLODIPine 10 MG tablet    NORVASC    30 tablet    Take 1 tablet (10 mg) by mouth daily       insulin glargine 100 UNIT/ML injection    LANTUS SOLOSTAR    15 mL    Inject 8 Units Subcutaneous At Bedtime Lantus Solostar Pen       metoprolol 25 MG tablet    LOPRESSOR    60 tablet    Take 1 tablet (25 mg) by mouth 2 times daily       * order for DME     1 each    Equipment being ordered: Walker Wheels () and Walker () Treatment Diagnosis: impaired gait stability       * order for DME     1 Device    Equipment being ordered: Other: Glucometer Treatment Diagnosis: Diabetes Needs testing strips for testing three times daily and control solution       oxyCODONE-acetaminophen 5-325 MG per tablet    PERCOCET    20 tablet    Take 1-2 tablets by mouth every 4 hours as  needed for moderate to severe pain       * Notice:  This list has 2 medication(s) that are the same as other medications prescribed for you. Read the directions carefully, and ask your doctor or other care provider to review them with you.

## 2017-05-23 NOTE — LETTER
May 23, 2017    Surgery Postoperative Note    RE:  Caro Landis-:  [birth year]     S: Elvia returns for a wound check of her right groin following an extensive excisional debridement for necrotizing soft tissue infection in April. She is doing well.      Abdomen: right groin - incision well healed, erythema with satellite lesions at lateral and medial aspect of incision c/w candidiasis     A/P  Caro Landis is recovering from an excisional biopsy following necrotizing soft tissue infection. I advised her to slowly return to regular activity. I expect she will make a complete recovery without issues. Continue excellent wound cares. Continue topical antifungal - apply BID for 4 days then daily until erythema resolved. Call to schedule appt if erythema is not improving in 2 weeks, otherwise follow up PRN.      Thank you for the opportunity to help in her care.     Mary Beth Neal  [facility]  [phone]

## 2017-05-30 ENCOUNTER — HOSPITAL ENCOUNTER (EMERGENCY)
Facility: CLINIC | Age: 37
Discharge: HOME | End: 2017-05-30
Attending: EMERGENCY MEDICINE | Admitting: EMERGENCY MEDICINE
Payer: COMMERCIAL

## 2017-05-30 ENCOUNTER — TELEPHONE (OUTPATIENT)
Dept: SURGERY | Facility: CLINIC | Age: 37
End: 2017-05-30

## 2017-05-30 VITALS
HEIGHT: 69 IN | OXYGEN SATURATION: 99 % | BODY MASS INDEX: 33.62 KG/M2 | DIASTOLIC BLOOD PRESSURE: 85 MMHG | RESPIRATION RATE: 18 BRPM | TEMPERATURE: 98.7 F | HEART RATE: 79 BPM | SYSTOLIC BLOOD PRESSURE: 150 MMHG | WEIGHT: 227 LBS

## 2017-05-30 DIAGNOSIS — L73.9 FOLLICULITIS: ICD-10-CM

## 2017-05-30 LAB
ANION GAP SERPL CALCULATED.3IONS-SCNC: 9 MMOL/L (ref 3–14)
BASOPHILS # BLD AUTO: 0 10E9/L (ref 0–0.2)
BASOPHILS NFR BLD AUTO: 0.3 %
BUN SERPL-MCNC: 17 MG/DL (ref 7–30)
CALCIUM SERPL-MCNC: 9.5 MG/DL (ref 8.5–10.1)
CHLORIDE SERPL-SCNC: 107 MMOL/L (ref 94–109)
CO2 BLDCOV-SCNC: 21 MMOL/L (ref 21–28)
CO2 SERPL-SCNC: 22 MMOL/L (ref 20–32)
CREAT SERPL-MCNC: 0.69 MG/DL (ref 0.52–1.04)
DIFFERENTIAL METHOD BLD: NORMAL
EOSINOPHIL # BLD AUTO: 0.2 10E9/L (ref 0–0.7)
EOSINOPHIL NFR BLD AUTO: 2.6 %
ERYTHROCYTE [DISTWIDTH] IN BLOOD BY AUTOMATED COUNT: 13.8 % (ref 10–15)
GFR SERPL CREATININE-BSD FRML MDRD: ABNORMAL ML/MIN/1.7M2
GLUCOSE SERPL-MCNC: 140 MG/DL (ref 70–99)
HCT VFR BLD AUTO: 39.6 % (ref 35–47)
HGB BLD-MCNC: 14 G/DL (ref 11.7–15.7)
IMM GRANULOCYTES # BLD: 0 10E9/L (ref 0–0.4)
IMM GRANULOCYTES NFR BLD: 0.3 %
LACTATE BLD-SCNC: 1.1 MMOL/L (ref 0.7–2.1)
LYMPHOCYTES # BLD AUTO: 2.6 10E9/L (ref 0.8–5.3)
LYMPHOCYTES NFR BLD AUTO: 33.4 %
MCH RBC QN AUTO: 28.7 PG (ref 26.5–33)
MCHC RBC AUTO-ENTMCNC: 35.4 G/DL (ref 31.5–36.5)
MCV RBC AUTO: 81 FL (ref 78–100)
MONOCYTES # BLD AUTO: 0.4 10E9/L (ref 0–1.3)
MONOCYTES NFR BLD AUTO: 5.4 %
NEUTROPHILS # BLD AUTO: 4.5 10E9/L (ref 1.6–8.3)
NEUTROPHILS NFR BLD AUTO: 58 %
NRBC # BLD AUTO: 0 10*3/UL
NRBC BLD AUTO-RTO: 0 /100
PCO2 BLDV: 40 MM HG (ref 40–50)
PH BLDV: 7.33 PH (ref 7.32–7.43)
PLATELET # BLD AUTO: 374 10E9/L (ref 150–450)
PO2 BLDV: 28 MM HG (ref 25–47)
POTASSIUM SERPL-SCNC: 4.3 MMOL/L (ref 3.4–5.3)
RBC # BLD AUTO: 4.88 10E12/L (ref 3.8–5.2)
SAO2 % BLDV FROM PO2: 49 %
SODIUM SERPL-SCNC: 138 MMOL/L (ref 133–144)
WBC # BLD AUTO: 7.8 10E9/L (ref 4–11)

## 2017-05-30 PROCEDURE — 85025 COMPLETE CBC W/AUTO DIFF WBC: CPT | Performed by: EMERGENCY MEDICINE

## 2017-05-30 PROCEDURE — 82803 BLOOD GASES ANY COMBINATION: CPT

## 2017-05-30 PROCEDURE — 99283 EMERGENCY DEPT VISIT LOW MDM: CPT

## 2017-05-30 PROCEDURE — 83605 ASSAY OF LACTIC ACID: CPT

## 2017-05-30 PROCEDURE — 80048 BASIC METABOLIC PNL TOTAL CA: CPT | Performed by: EMERGENCY MEDICINE

## 2017-05-30 NOTE — ED PROVIDER NOTES
"  History     Chief Complaint:  Wound Check    HPI   Caro Landis is a 37 year old female with a past medical history of diabetes, polycystic disease, necrotizing fasciitis who presents with a wound check.The patient states at the end of March the patient found a raised bump in her groin region, resembling a large pimple or ingrown hair. The patient mother states that the bump grew in size substantially within 24 hours so she came to the ED as it considered to spread and she was diagnosed with necrotizing fasciitis and occult diabetes at this time. She subsequently had 2 surgeries and states that she has been recovering and improving since. Today the patient presents with a similar spot in the genital area that she noticed today. It is the size of a svetlana and has not been growing in size.The patient has recently been diagnosed with a yeast infection.     Allergies:  Cats  Shellfish-derived products     Medications:    Lisinopril  Metoprolol  Lantus    Past Medical History:    Polycystic disease, ovaries  Diabetes  Necrotizing fasciitis     Past Surgical History:    Irrigation and debridement groin x2  Explore groin  Excise lesion groin  Appendectomy     Family History:    History reviewed.  No significant family history.     Social History:  Marital Status:    Smoking status: Current every day smoker  Alcohol use: Unknown      Review of Systems   Skin:        Svetlana-size spot on genital region.   All other systems reviewed and are negative.      Physical Exam   First Vitals:  BP: 150/85  Pulse: 79  Temp: 98.7  F (37.1  C)  Resp: 18  Height: 175.3 cm (5' 9\")  Weight: 103 kg (227 lb)  SpO2: 100 %    Physical Exam  SKIN:  Warm, dry.  1 cm erythematous round firm area of the upper midline mons pubis.  No fluctuance.  No surrounding soft tissue crepitus.  Right groin incision well healed.    HEMATOLOGIC/IMMUNOLOGIC/LYMPHATIC:  No pallor.  No inguinal adenopathy.  EYES:  Conjunctivae normal.  CARDIOVASCULAR:  " Regular rate and rhythm.  RESPIRATORY:  No respiratory distress, breath sounds equal and normal.  GASTROINTESTINAL:  Soft, nontender abdomen.  MUSCULOSKELETAL:  Painless torso ROM.  NEUROLOGIC:  Alert, conversant.  PSYCHIATRIC:  Normal mood.    Emergency Department Course   Laboratory:  1611 ISTAT gases lactate george POCT: No abnormalities    CBC: AWNL. (WBC 7.8, HGB 14.0, )   BMP: Glucose 140 (H) o/w WNL. (Creatinine 0.69)    Emergency Department Course:  Nursing notes and vitals reviewed.I performed an exam of the patient as documented above.    Blood drawn. This was sent to the lab for further testing, results above.    1633 I reevaluated the patient and provided an update in regards to her ED course.      Findings and plan explained to the Patient. Patient discharged home with instructions regarding supportive care, medications, and reasons to return. The importance of close follow-up was reviewed.     Impression & Plan    Medical Decision Making:  Caro Landis is a 37 year old female who was concerned about the prospect of recurrent Necrotizing Fascitis given what she went through in the end of March. The area of concern is at the mons pubis and approximately dime sized in circumference. Non tender, non fluctuant, and non draining, and no palpable subcutaneous gas or air in the soft tissues. She clinically is well appearing. The surgical site in the right groin is heeling and non tender also. Screening tests were reassuring regarding rebounding or recurrent infection. She really has had no change in the size of this small area of inflammation since the noted onset several hours ago. All of this argues against recurrent Necrotizing Fasciitis so I advised to keep a close eye on this area and if it changes in any concerning way to concern to the emergency department for reassessment, even later tonight. Otherwise follow up as needed.     Diagnosis:    ICD-10-CM    1. Folliculitis L73.9 Basic metabolic panel        Disposition:  Discharged to home.     Discharge Medications:  New Prescriptions    No medications on file     IKathy, am serving as a scribe on 5/30/2017 at 3:58 PM to personally document services performed by Bautista Skinner MD based on my observations and the provider's statements to me.     Kathy Banegas  5/30/2017    EMERGENCY DEPARTMENT       Bautista Skinner MD  05/31/17 [id number]

## 2017-05-30 NOTE — TELEPHONE ENCOUNTER
Patient called with concerns of firm raised area in groin where she had necrotizing soft tissue infection. Patient states that sx. Are the same as what they were before. Denies fever, redness at sight. Discussed with Dr. De León and due to the rapid progression of the infection previously, patient should go to ED for further evaluation and have a CT scan to evaluate the area asap. Dr. De León will call ED to notify them of patient and previous hx. And recommendations Patient verbalized understanding of this and agreed. Encouraged them to call back if they had further questions or concerns.    Bria Bose RN BSN

## 2017-05-30 NOTE — DISCHARGE INSTRUCTIONS
Folliculitis  Folliculitis is an infection of a hair follicle. A hair follicle is the little pocket where a hair grows out of the skin. Bacteria normally live on the skin. But sometimes bacteria can get trapped in a follicle and cause inflammation. This causes a bumpy rash. The area over the follicles is red and raised. It may itch or be painful. The bumps may have fluid (pus) inside. The pus may leak and then form crusts. Sores can spread to other areas of the body. Once it goes away, folliculitis can come back at any time. Severe cases may cause permanent hair loss and scarring.  Folliculitis can happen anywhere on the body that hair grows. It can be caused by rubbing from tight clothing. Ingrown hairs can cause it. Soaking in a hot tub or swimming pool that has bacteria in the water can cause it. It may also occur if a hair follicle is blocked by a bandage.  Sores often go away in a few days with no treatment. In some cases, medication may be given. A small piece of skin or pus may be taken to find the type of bacteria causing the infection.  Home care  The health care provider may prescribe an antibiotic cream or ointment.  Oral antibiotics may also be prescribed. Or you may be told to use an over-the-counter antibiotic cream. Follow all instructions when using any of these medications.  General care:    Apply warm, moist compresses to the sores for 20 minutes up to 3 times a day. You can make a compress by soaking a cloth in warm water. Squeeze out excess water.    Don t cut, poke, or squeeze the sores. This can be painful and spread infection.    Don t scratch the affected area. Scratching can delay healing.    Don t shave the areas affected by folliculitis.    If the sores leak fluid, cover the area with a nonstick gauze bandage. Use as little tape as possible. Carefully discard all soiled bandages.    Dress in loose cotton clothing.    Change sheets and blankets if they are soiled by pus. Wash all clothes,  towels, sheets, and cloth diapers in soap and hot water. Do not share clothes, towels, or sheets with other family members.    Do not soak the sores in bath water. This can spread infection. Instead, keep the area clean by gently washing sores with soap and warm water.    Wash your hands or use antibacterial gels often to prevent spreading the bacteria.  Follow-up care  Follow up with your health care provider.  When to seek medical advice  Call your health care provider right away if any of these occur:    Fever of 100.4 F (38 C) or higher, rectal    Spreading of the rash    Rash does not get better with treatment    Redness or swelling that gets worse    Rash becomes more painful    Foul-smelling fluid leaking from the skin    Rash improves, but then comes back     [id number] [employer]. [address] [place] [address], [place] [address]. All rights reserved. This information is not intended as a substitute for professional medical care. Always follow your healthcare professional's instructions.

## 2017-05-30 NOTE — ED AVS SNAPSHOT
Emergency Department    [address] [place] [id number]    Phone:  [phone]    Fax:  [phone]                                       Caro Landis   MRN: [id number]    Department:   Emergency Department   Date of Visit:  5/30/2017           Patient Information     Date Of Birth          1980        Your diagnoses for this visit were:     Folliculitis        You were seen by Bautista Skinner MD.      Follow-up Information     Please follow up.    Why:  watch this area closely and return if any concerns        Discharge Instructions         Folliculitis  Folliculitis is an infection of a hair follicle. A hair follicle is the little pocket where a hair grows out of the skin. Bacteria normally live on the skin. But sometimes bacteria can get trapped in a follicle and cause inflammation. This causes a bumpy rash. The area over the follicles is red and raised. It may itch or be painful. The bumps may have fluid (pus) inside. The pus may leak and then form crusts. Sores can spread to other areas of the body. Once it goes away, folliculitis can come back at any time. Severe cases may cause permanent hair loss and scarring.  Folliculitis can happen anywhere on the body that hair grows. It can be caused by rubbing from tight clothing. Ingrown hairs can cause it. Soaking in a hot tub or swimming pool that has bacteria in the water can cause it. It may also occur if a hair follicle is blocked by a bandage.  Sores often go away in a few days with no treatment. In some cases, medication may be given. A small piece of skin or pus may be taken to find the type of bacteria causing the infection.  Home care  The health care provider may prescribe an antibiotic cream or ointment.  Oral antibiotics may also be prescribed. Or you may be told to use an over-the-counter antibiotic cream. Follow all instructions when using any of these medications.  General care:    Apply warm, moist compresses to the  sores for 20 minutes up to 3 times a day. You can make a compress by soaking a cloth in warm water. Squeeze out excess water.    Don t cut, poke, or squeeze the sores. This can be painful and spread infection.    Don t scratch the affected area. Scratching can delay healing.    Don t shave the areas affected by folliculitis.    If the sores leak fluid, cover the area with a nonstick gauze bandage. Use as little tape as possible. Carefully discard all soiled bandages.    Dress in loose cotton clothing.    Change sheets and blankets if they are soiled by pus. Wash all clothes, towels, sheets, and cloth diapers in soap and hot water. Do not share clothes, towels, or sheets with other family members.    Do not soak the sores in bath water. This can spread infection. Instead, keep the area clean by gently washing sores with soap and warm water.    Wash your hands or use antibacterial gels often to prevent spreading the bacteria.  Follow-up care  Follow up with your health care provider.  When to seek medical advice  Call your health care provider right away if any of these occur:    Fever of 100.4 F (38 C) or higher, rectal    Spreading of the rash    Rash does not get better with treatment    Redness or swelling that gets worse    Rash becomes more painful    Foul-smelling fluid leaking from the skin    Rash improves, but then comes back     [phone] [employer]. [address] [place] [address], [place] [address]. All rights reserved. This information is not intended as a substitute for professional medical care. Always follow your healthcare professional's instructions.          24 Hour Appointment Hotline       To make an appointment at any [facility] clinic, call [phone] ([phone]). If you don't have a family doctor or clinic, we will help you find one. [facility] clinics are conveniently located to serve the needs of you and your family.             Review of your medicines      Our records show that  you are taking the medicines listed below. If these are incorrect, please call your family doctor or clinic.        Dose / Directions Last dose taken    amLODIPine 10 MG tablet   Commonly known as:  NORVASC   Dose:  10 mg   Quantity:  30 tablet        Take 1 tablet (10 mg) by mouth daily   Refills:  1        insulin glargine 100 UNIT/ML injection   Commonly known as:  LANTUS SOLOSTAR   Dose:  8 Units   Quantity:  15 mL        Inject 8 Units Subcutaneous At Bedtime Lantus Solostar Pen   Refills:  0        LISINOPRIL PO        Refills:  0        metoprolol 25 MG tablet   Commonly known as:  LOPRESSOR   Dose:  25 mg   Quantity:  60 tablet        Take 1 tablet (25 mg) by mouth 2 times daily   Refills:  1        * order for DME   Quantity:  1 each        Equipment being ordered: Walker Wheels () and Walker () Treatment Diagnosis: impaired gait stability   Refills:  0        * order for DME   Quantity:  1 Device        Equipment being ordered: Other: Glucometer Treatment Diagnosis: Diabetes Needs testing strips for testing three times daily and control solution   Refills:  0        * Notice:  This list has 2 medication(s) that are the same as other medications prescribed for you. Read the directions carefully, and ask your doctor or other care provider to review them with you.            Procedures and tests performed during your visit     Basic metabolic panel    CBC with platelets differential    ISTAT gases lactate george POCT    Peripheral IV: Standard      Orders Needing Specimen Collection     None      Pending Results     No orders found from 5/28/2017 to 5/31/2017.            Pending Culture Results     No orders found from 5/28/2017 to 5/31/2017.            Pending Results Instructions     If you had any lab results that were not finalized at the time of your Discharge, you can call the ED Lab Result RN at [phone]. You will be contacted by this team for any positive Lab results or changes in  treatment. The nurses are available 7 days a week from 10A to 6:30P.  You can leave a message 24 hours per day and they will return your call.        Test Results From Your Hospital Stay        5/30/2017  4:14 PM      Component Results     Component Value Ref Range & Units Status    WBC 7.8 4.0 - 11.0 10e9/L Final    RBC Count 4.88 3.8 - 5.2 10e12/L Final    Hemoglobin 14.0 11.7 - 15.7 g/dL Final    Hematocrit 39.6 35.0 - 47.0 % Final    MCV 81 78 - 100 fl Final    MCH 28.7 26.5 - 33.0 pg Final    MCHC 35.4 31.5 - 36.5 g/dL Final    RDW 13.8 10.0 - 15.0 % Final    Platelet Count 374 150 - 450 10e9/L Final    Diff Method Automated Method  Final    % Neutrophils 58.0 % Final    % Lymphocytes 33.4 % Final    % Monocytes 5.4 % Final    % Eosinophils 2.6 % Final    % Basophils 0.3 % Final    % Immature Granulocytes 0.3 % Final    Nucleated RBCs 0 0 /100 Final    Absolute Neutrophil 4.5 1.6 - 8.3 10e9/L Final    Absolute Lymphocytes 2.6 0.8 - 5.3 10e9/L Final    Absolute Monocytes 0.4 0.0 - 1.3 10e9/L Final    Absolute Eosinophils 0.2 0.0 - 0.7 10e9/L Final    Absolute Basophils 0.0 0.0 - 0.2 10e9/L Final    Abs Immature Granulocytes 0.0 0 - 0.4 10e9/L Final    Absolute Nucleated RBC 0.0  Final         5/30/2017  4:25 PM      Component Results     Component Value Ref Range & Units Status    Sodium 138 133 - 144 mmol/L Final    Potassium 4.3 3.4 - 5.3 mmol/L Final    Chloride 107 94 - 109 mmol/L Final    Carbon Dioxide 22 20 - 32 mmol/L Final    Anion Gap 9 3 - 14 mmol/L Final    Glucose 140 (H) 70 - 99 mg/dL Final    Urea Nitrogen 17 7 - 30 mg/dL Final    Creatinine 0.69 0.52 - 1.04 mg/dL Final    GFR Estimate >90  Non  GFR Calc   >60 mL/min/1.7m2 Final    GFR Estimate If Black >90   GFR Calc   >60 mL/min/1.7m2 Final    Calcium 9.5 8.5 - 10.1 mg/dL Final               5/30/2017  4:11 PM      Component Results     Component Value Ref Range & Units Status    Ph Venous 7.33 7.32 - 7.43 pH  Final    PCO2 Venous 40 40 - 50 mm Hg Final    PO2 Venous 28 25 - 47 mm Hg Final    Bicarbonate Venous 21 21 - 28 mmol/L Final    O2 Sat Venous 49 % Final    Lactic Acid 1.1 0.7 - 2.1 mmol/L Final                Clinical Quality Measure: Blood Pressure Screening     Your blood pressure was checked while you were in the emergency department today. The last reading we obtained was  BP: 150/85 . Please read the guidelines below about what these numbers mean and what you should do about them.  If your systolic blood pressure (the top number) is less than 120 and your diastolic blood pressure (the bottom number) is less than 80, then your blood pressure is normal. There is nothing more that you need to do about it.  If your systolic blood pressure (the top number) is 120-139 or your diastolic blood pressure (the bottom number) is 80-89, your blood pressure may be higher than it should be. You should have your blood pressure rechecked within a year by a primary care provider.  If your systolic blood pressure (the top number) is 140 or greater or your diastolic blood pressure (the bottom number) is 90 or greater, you may have high blood pressure. High blood pressure is treatable, but if left untreated over time it can put you at risk for heart attack, stroke, or kidney failure. You should have your blood pressure rechecked by a primary care provider within the next 4 weeks.  If your provider in the emergency department today gave you specific instructions to follow-up with your doctor or provider even sooner than that, you should follow that instruction and not wait for up to 4 weeks for your follow-up visit.        Thank you for choosing [facility]       Thank you for choosing [facility] for your care. Our goal is always to provide you with excellent care. Hearing back from our patients is one way we can continue to improve our services. Please take a few minutes to complete the written survey that you may receive in the mail  "after you visit with us. Thank you!        [employer] Information     [employer] lets you send messages to your doctor, view your test results, renew your prescriptions, schedule appointments and more. To sign up, go to [email or web address] . Click on \"Log in\" on the left side of the screen, which will take you to the Welcome page. Then click on \"Sign up Now\" on the right side of the page.     You will be asked to enter the access code listed below, as well as some personal information. Please follow the directions to create your username and password.     Your access code is: [email or web address]  Expires: 2017 10:06 AM     Your access code will  in 90 days. If you need help or a new code, please call your [facility] clinic or [phone].        Care EveryWhere ID     This is your Care EveryWhere ID. This could be used by other organizations to access your [facility] medical records  [id number]        After Visit Summary       This is your record. Keep this with you and show to your community pharmacist(s) and doctor(s) at your next visit.                  "

## 2017-05-30 NOTE — ED AVS SNAPSHOT
Emergency Department    [address] [place] [address]    Phone:  [phone]    Fax:  [phone]                                       Caro Landis   MRN: [id number]    Department:   Emergency Department   Date of Visit:  5/30/2017           After Visit Summary Signature Page     I have received my discharge instructions, and my questions have been answered. I have discussed any challenges I see with this plan with the nurse or doctor.    ..........................................................................................................................................  Patient/Patient Representative Signature      ..........................................................................................................................................  Patient Representative Print Name and Relationship to Patient    ..................................................               ................................................  Date                                            Time    ..........................................................................................................................................  Reviewed by Signature/Title    ...................................................              ..............................................  Date                                                            Time

## 2018-06-15 ENCOUNTER — HOSPITAL ENCOUNTER (EMERGENCY)
Facility: CLINIC | Age: 38
Discharge: HOME | End: 2018-06-15
Attending: EMERGENCY MEDICINE | Admitting: EMERGENCY MEDICINE
Payer: COMMERCIAL

## 2018-06-15 VITALS
SYSTOLIC BLOOD PRESSURE: 151 MMHG | OXYGEN SATURATION: 99 % | HEIGHT: 71 IN | RESPIRATION RATE: 16 BRPM | DIASTOLIC BLOOD PRESSURE: 82 MMHG | BODY MASS INDEX: 31.64 KG/M2 | TEMPERATURE: 98.4 F | HEART RATE: 84 BPM | WEIGHT: 226 LBS

## 2018-06-15 DIAGNOSIS — L03.319 CELLULITIS AND ABSCESS OF TRUNK: ICD-10-CM

## 2018-06-15 DIAGNOSIS — L02.219 CELLULITIS AND ABSCESS OF TRUNK: ICD-10-CM

## 2018-06-15 LAB
ANION GAP SERPL CALCULATED.3IONS-SCNC: 10 MMOL/L (ref 3–14)
BASOPHILS # BLD AUTO: 0 10E9/L (ref 0–0.2)
BASOPHILS NFR BLD AUTO: 0.3 %
BUN SERPL-MCNC: 13 MG/DL (ref 7–30)
CALCIUM SERPL-MCNC: 8.5 MG/DL (ref 8.5–10.1)
CHLORIDE SERPL-SCNC: 108 MMOL/L (ref 94–109)
CO2 SERPL-SCNC: 20 MMOL/L (ref 20–32)
CREAT SERPL-MCNC: 0.64 MG/DL (ref 0.52–1.04)
DIFFERENTIAL METHOD BLD: NORMAL
EOSINOPHIL # BLD AUTO: 0.3 10E9/L (ref 0–0.7)
EOSINOPHIL NFR BLD AUTO: 3.4 %
ERYTHROCYTE [DISTWIDTH] IN BLOOD BY AUTOMATED COUNT: 12.5 % (ref 10–15)
GFR SERPL CREATININE-BSD FRML MDRD: >90 ML/MIN/1.7M2
GLUCOSE SERPL-MCNC: 151 MG/DL (ref 70–99)
HCT VFR BLD AUTO: 39.9 % (ref 35–47)
HGB BLD-MCNC: 14.3 G/DL (ref 11.7–15.7)
IMM GRANULOCYTES # BLD: 0 10E9/L (ref 0–0.4)
IMM GRANULOCYTES NFR BLD: 0.4 %
LACTATE BLD-SCNC: 2.2 MMOL/L (ref 0.7–2)
LYMPHOCYTES # BLD AUTO: 2.6 10E9/L (ref 0.8–5.3)
LYMPHOCYTES NFR BLD AUTO: 27.7 %
MCH RBC QN AUTO: 29 PG (ref 26.5–33)
MCHC RBC AUTO-ENTMCNC: 35.8 G/DL (ref 31.5–36.5)
MCV RBC AUTO: 81 FL (ref 78–100)
MONOCYTES # BLD AUTO: 0.6 10E9/L (ref 0–1.3)
MONOCYTES NFR BLD AUTO: 6.6 %
NEUTROPHILS # BLD AUTO: 5.9 10E9/L (ref 1.6–8.3)
NEUTROPHILS NFR BLD AUTO: 61.6 %
NRBC # BLD AUTO: 0 10*3/UL
NRBC BLD AUTO-RTO: 0 /100
PLATELET # BLD AUTO: 298 10E9/L (ref 150–450)
POTASSIUM SERPL-SCNC: 4.1 MMOL/L (ref 3.4–5.3)
RBC # BLD AUTO: 4.93 10E12/L (ref 3.8–5.2)
SODIUM SERPL-SCNC: 138 MMOL/L (ref 133–144)
WBC # BLD AUTO: 9.5 10E9/L (ref 4–11)

## 2018-06-15 PROCEDURE — 83605 ASSAY OF LACTIC ACID: CPT | Performed by: EMERGENCY MEDICINE

## 2018-06-15 PROCEDURE — 99284 EMERGENCY DEPT VISIT MOD MDM: CPT | Mod: 25

## 2018-06-15 PROCEDURE — 10060 I&D ABSCESS SIMPLE/SINGLE: CPT

## 2018-06-15 PROCEDURE — 76705 ECHO EXAM OF ABDOMEN: CPT

## 2018-06-15 PROCEDURE — 85025 COMPLETE CBC W/AUTO DIFF WBC: CPT | Performed by: EMERGENCY MEDICINE

## 2018-06-15 PROCEDURE — 80048 BASIC METABOLIC PNL TOTAL CA: CPT | Performed by: EMERGENCY MEDICINE

## 2018-06-15 ASSESSMENT — ENCOUNTER SYMPTOMS
NAUSEA: 0
SHORTNESS OF BREATH: 0
ABDOMINAL PAIN: 0
COUGH: 0
FEVER: 0
VOMITING: 0

## 2018-06-15 NOTE — ED AVS SNAPSHOT
Emergency Department    [address] [facility] [address]    Phone:  [phone]    Fax:  [phone]                                       Caro Landis   MRN: [id number]    Department:   Emergency Department   Date of Visit:  6/15/2018           Patient Information     Date Of Birth          1980        Your diagnoses for this visit were:     Cellulitis and abscess of trunk        You were seen by Jake Hong MD.      Follow-up Information     Follow up with Barbara Allan In 1 week.    Specialty:  INTERNAL MEDICINE - ENDOCRINOLOGY, DIABETES & METABOLISM    Contact information:    [place] [facility]  [address] [facility] [address] [phone]          Follow up with  Emergency Department.    Specialty:  EMERGENCY MEDICINE    Why:  As needed, If symptoms worsen    Contact information:    [address] [facility] [id number] [phone]        Discharge Instructions         Abscess (Incision & Drainage)  An abscess is sometimes called a boil. It happens when bacteria get trapped under the skin and start to grow. Pus forms inside the abscess as the body responds to the bacteria. An abscess can happen with an insect bite, ingrown hair, blocked oil gland, pimple, cyst, or puncture wound.  Your healthcare provider has drained the pus from your abscess. If the abscess pocket was large, your healthcare provider may have put in gauze packing. Your provider will need to remove it on your next visit. He or she may also replace it at that time. You may not need antibiotics to treat a simple abscess, unless the infection is spreading into the skin around the wound (cellulitis).  The wound will take about 1 to 2 weeks to heal, depending on the size of the abscess. Healthy tissue will grow from the bottom and sides of the opening until it seals over.  Home care  These tips can help your wound heal:    The wound may drain for the first 2 days. Cover  the wound with a clean dry dressing. Change the dressing if it becomes soaked with blood or pus.    If a gauze packing was placed inside the abscess pocket, you may be told to remove it yourself. You may do this in the shower. Once the packing is removed, you should wash the area in the shower, or clean the area as directed by your provider. Continue to do this until the skin opening has closed. Make sure you wash your hands after changing the packing or cleaning the wound.    If you were prescribed antibiotics, take them as directed until they are all gone.    You may use acetaminophen or ibuprofen to control pain, unless another pain medicine was prescribed. If you have liver disease or ever had a stomach ulcer, talk with your doctor before using these medicines.  Follow-up care  Follow up with your healthcare provider, or as advised. If a gauze packing was put in your wound, it should be removed in 1 to 2 days. Check your wound every day for any signs that the infection is getting worse. The signs are listed below.  When to seek medical advice  Call your healthcare provider right away if any of these occur:    Increasing redness or swelling    Red streaks in the skin leading away from the wound    Increasing local pain or swelling    Continued pus draining from the wound 2 days after treatment    Fever of 100.4 F (38 C) or higher, or as directed by your healthcare provider    Boil returns when you are at home  Date Last Reviewed: 9/1/2016 2000-2017 [employer]. [address] [place] [address]. All rights reserved. This information is not intended as a substitute for professional medical care. Always follow your healthcare professional's instructions.          24 Hour Appointment Hotline       To make an appointment at any [facility], call [phone] ([phone]). If you don't have a family doctor or clinic, we will help you find one. [facility] are conveniently  located to serve the needs of you and your family.             Review of your medicines      Our records show that you are taking the medicines listed below. If these are incorrect, please call your family doctor or clinic.        Dose / Directions Last dose taken    amLODIPine 10 MG tablet   Commonly known as:  NORVASC   Dose:  10 mg   Quantity:  30 tablet        Take 1 tablet (10 mg) by mouth daily   Refills:  1        insulin glargine 100 UNIT/ML injection   Commonly known as:  LANTUS SOLOSTAR   Dose:  8 Units   Quantity:  15 mL        Inject 8 Units Subcutaneous At Bedtime Lantus Solostar Pen   Refills:  0        LISINOPRIL PO        Refills:  0        metoprolol tartrate 25 MG tablet   Commonly known as:  LOPRESSOR   Dose:  25 mg   Quantity:  60 tablet        Take 1 tablet (25 mg) by mouth 2 times daily   Refills:  1        * order for DME   Quantity:  1 each        Equipment being ordered: Walker Wheels () and Walker () Treatment Diagnosis: impaired gait stability   Refills:  0        * order for DME   Quantity:  1 Device        Equipment being ordered: Other: Glucometer Treatment Diagnosis: Diabetes Needs testing strips for testing three times daily and control solution   Refills:  0        * Notice:  This list has 2 medication(s) that are the same as other medications prescribed for you. Read the directions carefully, and ask your doctor or other care provider to review them with you.            Procedures and tests performed during your visit     Basic metabolic panel    CBC with platelets + differential    Lactic acid    POC US SOFT TISSUE      Orders Needing Specimen Collection     None      Pending Results     No orders found from 6/13/2018 to 6/16/2018.            Pending Culture Results     No orders found from 6/13/2018 to 6/16/2018.            Pending Results Instructions     If you had any lab results that were not finalized at the time of your Discharge, you can call the ED Lab Result RN  at [phone]. You will be contacted by this team for any positive Lab results or changes in treatment. The nurses are available 7 days a week from 10A to 6:30P.  You can leave a message 24 hours per day and they will return your call.        Test Results From Your Hospital Stay        6/15/2018  9:12 AM      Component Results     Component Value Ref Range & Units Status    WBC 9.5 4.0 - 11.0 10e9/L Final    RBC Count 4.93 3.8 - 5.2 10e12/L Final    Hemoglobin 14.3 11.7 - 15.7 g/dL Final    Hematocrit 39.9 35.0 - 47.0 % Final    MCV 81 78 - 100 fl Final    MCH 29.0 26.5 - 33.0 pg Final    MCHC 35.8 31.5 - 36.5 g/dL Final    RDW 12.5 10.0 - 15.0 % Final    Platelet Count 298 150 - 450 10e9/L Final    Diff Method Automated Method  Final    % Neutrophils 61.6 % Final    % Lymphocytes 27.7 % Final    % Monocytes 6.6 % Final    % Eosinophils 3.4 % Final    % Basophils 0.3 % Final    % Immature Granulocytes 0.4 % Final    Nucleated RBCs 0 0 /100 Final    Absolute Neutrophil 5.9 1.6 - 8.3 10e9/L Final    Absolute Lymphocytes 2.6 0.8 - 5.3 10e9/L Final    Absolute Monocytes 0.6 0.0 - 1.3 10e9/L Final    Absolute Eosinophils 0.3 0.0 - 0.7 10e9/L Final    Absolute Basophils 0.0 0.0 - 0.2 10e9/L Final    Abs Immature Granulocytes 0.0 0 - 0.4 10e9/L Final    Absolute Nucleated RBC 0.0  Final         6/15/2018  9:26 AM      Component Results     Component Value Ref Range & Units Status    Sodium 138 133 - 144 mmol/L Final    Potassium 4.1 3.4 - 5.3 mmol/L Final    Chloride 108 94 - 109 mmol/L Final    Carbon Dioxide 20 20 - 32 mmol/L Final    Anion Gap 10 3 - 14 mmol/L Final    Glucose 151 (H) 70 - 99 mg/dL Final    Urea Nitrogen 13 7 - 30 mg/dL Final    Creatinine 0.64 0.52 - 1.04 mg/dL Final    GFR Estimate >90 >60 mL/min/1.7m2 Final    Non  GFR Calc    GFR Estimate If Black >90 >60 mL/min/1.7m2 Final    African American GFR Calc    Calcium 8.5 8.5 - 10.1 mg/dL Final         6/15/2018  9:12 AM      Component  Results     Component Value Ref Range & Units Status    Lactic Acid 2.2 (H) 0.7 - 2.0 mmol/L Final         6/15/2018  9:14 AM      [facility] Procedure Note     Limited Bedside ED Ultrasound of Soft Tissue:    PROCEDURE: PERFORMED BY: Dr. Jake Hong  INDICATIONS/SYMPTOM: Skin redness, evaluate for abscess, cellulitis or foreign body  PROBE: High frequency linear probe  BODY LOCATION: Soft tissue located on trunk     FINDINGS: Cobblestoning of soft tissue: present   Hypoechoic fluid (ie abscess) identified: present measuring 1.5 cm   US utilized to access fluid pocket with sterile blade  INTERPRETATION:  The soft tissue and muscle layers were evaluated.   No gas was noted.   Findings indicate abscess    IMAGE DOCUMENTATION: Images were archived to PACs system.                  Clinical Quality Measure: Blood Pressure Screening     Your blood pressure was checked while you were in the emergency department today. The last reading we obtained was  BP: 151/82 . Please read the guidelines below about what these numbers mean and what you should do about them.  If your systolic blood pressure (the top number) is less than 120 and your diastolic blood pressure (the bottom number) is less than 80, then your blood pressure is normal. There is nothing more that you need to do about it.  If your systolic blood pressure (the top number) is 120-139 or your diastolic blood pressure (the bottom number) is 80-89, your blood pressure may be higher than it should be. You should have your blood pressure rechecked within a year by a primary care provider.  If your systolic blood pressure (the top number) is 140 or greater or your diastolic blood pressure (the bottom number) is 90 or greater, you may have high blood pressure. High blood pressure is treatable, but if left untreated over time it can put you at risk for heart attack, stroke, or kidney failure. You should have your blood pressure rechecked by a  "primary care provider within the next 4 weeks.  If your provider in the emergency department today gave you specific instructions to follow-up with your doctor or provider even sooner than that, you should follow that instruction and not wait for up to 4 weeks for your follow-up visit.        Thank you for choosing [facility]       Thank you for choosing [facility] for your care. Our goal is always to provide you with excellent care. Hearing back from our patients is one way we can continue to improve our services. Please take a few minutes to complete the written survey that you may receive in the mail after you visit with us. Thank you!        [email or web address] Information     [email or web address] lets you send messages to your doctor, view your test results, renew your prescriptions, schedule appointments and more. To sign up, go to [email or web address] . Click on \"Log in\" on the left side of the screen, which will take you to the Welcome page. Then click on \"Sign up Now\" on the right side of the page.     You will be asked to enter the access code listed below, as well as some personal information. Please follow the directions to create your username and password.     Your access code is: [email or web address]  Expires: 2018 10:27 AM     Your access code will  in 90 days. If you need help or a new code, please call your [facility] clinic or [phone].        Care EveryWhere ID     This is your Care EveryWhere ID. This could be used by other organizations to access your [facility] medical records  [id number]        Equal Access to Services     NEREYDA DELANEY : Hadfredy rasmussen Solinda, waaxda luswapna, qaybta kaalmada anabel chase . So [facility] [phone].    ATENCIÓN: Si habla español, tiene a thompson disposición servicios gratuitos de asistencia lingüística. Llame al [phone].    We comply with applicable federal civil rights laws and [place] laws. We do not discriminate on the basis of " race, color, national origin, age, disability, sex, sexual orientation, or gender identity.            After Visit Summary       This is your record. Keep this with you and show to your community pharmacist(s) and doctor(s) at your next visit.

## 2018-06-15 NOTE — ED PROVIDER NOTES
"  History     Chief Complaint:  Groin Pain     HPI   Caro Landis is a 38 year old female who presents with right groin swelling and redness.  Patient had necrotizing fasciitis a year ago that required a few debridements and healing over time.  States over the last week she noticed some redness and slight swelling.  She thought it was irritation as she had her menstrual period and had a pad in place and that it may have been rubbing.  She states it is not significantly grown or changed over that time and she has not had any fevers, body aches, or any other systemic symptoms.  She states it feels very different than when she had the necrotizing fasciitis in the past..    Allergies:  No Known Drug Allergies      Medications:    Norvasc  Lantus  Lisinopril  Metoprolol    Past Medical History:    Polycystic disease, ovaries  Type II diabetes  Necrotizing fasciitis      Past Surgical History:    Appendectomy  Apply wound vac  Excise lesion groin  Explore groin  Irrigation and debridement groin x2     Family History:    The patient denies any relevant family medical history.     Social History:  The patient was accompanied to the ED alone.  Smoking Status: Yes  Smokeless Tobacco: N/A  Alcohol Use: N/A   PCP: Barbara Allan     Review of Systems   Constitutional: Negative for fever.   Respiratory: Negative for cough and shortness of breath.    Cardiovascular: Negative for chest pain.   Gastrointestinal: Negative for abdominal pain, nausea and vomiting.   Skin: Positive for rash.   All other systems reviewed and are negative.    Physical Exam   Patient Vitals for the past 24 hrs:   BP Temp Temp src Pulse Resp SpO2 Height Weight   06/15/18 0846 151/82 98.4  F (36.9  C) Oral 84 16 99 % 1.798 m (5' 10.8\") 102.5 kg (226 lb)        Physical Exam  General: Appears well-developed and well-nourished.   Head: No signs of trauma.   CV: Normal rate and regular rhythm.    Resp: Effort normal and breath sounds normal. No respiratory " distress.   GI: Soft. There is no tenderness.  No rebound or guarding.  Normal bowel sounds.    MSK: Normal range of motion.   Neuro: The patient is alert and oriented.  Speech normal.  GCS 15  Skin: Skin is warm and dry. Erythema and 1.5cm area of fluctuance to right inguinal crease without significant surrounding erythema.  Psych: normal mood and affect. behavior is normal.     Emergency Department Course     Imaging:  All imaging results were discussed with the Patient who voiced understanding of the findings.  POC Soft Tissue:  FINDINGS: Cobblestoning of soft tissue: present  Hypoechoic fluid (ie abscess) identified: present measuring 1.5 cm  US utilized to access fluid pocket with sterile blade  INTERPRETATION:  The soft tissue and muscle layers were evaluated.   No gas was noted.  Findings indicate abscess  Read by MD.       Laboratory:  CBC: AWNL (WBC 9.5, HGB 14.3, )  BMP: Glucose 151 (H) o/w WNL (Creatinine 0.64)  Lactic Acid (Collected 0900, Resulted 0912): 2.2 (H) \      Procedures:  PROCEDURE: the area was prepped with betadine and using an 11 blade, an incision was placed after using lidocaine and bupivacaine with epi locally. The abcess was then drained and a vessel loop was placed and dressed/covered. Pt tolerated procedure well without complications. EBL was minimal.      Interventions:  Medications   lidocaine/EPINEPHrine/tetracaine (LET) solution SOLN 5 mL (not administered)        Emergency Department Course:  Past medical records, nursing notes, and vitals reviewed.  I performed an exam of the patient and obtained history, as documented above.  IV was inserted and blood was drawn for laboratory testing, results above.  I rechecked the patient. Findings and plan explained to the Patient . Patient was discharged home.    Impression & Plan      Medical Decision Making:  Patient is a 38-year-old woman presents due to swelling and redness to the right inguinal region.  She reports that she has  a history of necrotizing fasciitis in the past, but states that this feels very different than that.  On my evaluation, there was a 1.5 cm area of fluctuance with localized erythema but no surrounding erythema.  Patient did not have any systemic symptoms of illness.  Bedside ultrasound was completed that did show fluid collection.  I did perform an incision and drainage and did express a fair amount of purulent material.  I used a vessel loop to help encourage continued drainage and a bulky dressing was placed.  Patient tolerated this quite well.  Given there is no surrounding erythema, I do not feel that antibiotics were indicated as there is no signs of cellulitis and I do not want to cause further complications.  Patient was recommended to continue with warm compresses and to follow-up with the primary care doctor.  She is asked return if she had fevers or any new or worsening symptoms.  Clinically of a very low suspicion for necrotizing fasciitis as this felt very different to the patient, there is no rapid spread as there had been prior, and there is no clear signs of gas on ultrasound.    Diagnosis:    ICD-10-CM    1. Cellulitis and abscess of trunk L03.319     L02.219         Discharge Medications:  New Prescriptions    No medications on file        6/15/2018   Jake Hong MD [place]al, John A, MD  06/15/18 1027

## 2018-06-15 NOTE — ED AVS SNAPSHOT
Emergency Department    [address] [facility] [address]    Phone:  [phone]    Fax:  [phone]                                       Caro Landis   MRN: [id number]    Department:   Emergency Department   Date of Visit:  6/15/2018           After Visit Summary Signature Page     I have received my discharge instructions, and my questions have been answered. I have discussed any challenges I see with this plan with the nurse or doctor.    ..........................................................................................................................................  Patient/Patient Representative Signature      ..........................................................................................................................................  Patient Representative Print Name and Relationship to Patient    ..................................................               ................................................  Date                                            Time    ..........................................................................................................................................  Reviewed by Signature/Title    ...................................................              ..............................................  Date                                                            Time

## 2018-06-15 NOTE — DISCHARGE INSTRUCTIONS
Abscess (Incision & Drainage)  An abscess is sometimes called a boil. It happens when bacteria get trapped under the skin and start to grow. Pus forms inside the abscess as the body responds to the bacteria. An abscess can happen with an insect bite, ingrown hair, blocked oil gland, pimple, cyst, or puncture wound.  Your healthcare provider has drained the pus from your abscess. If the abscess pocket was large, your healthcare provider may have put in gauze packing. Your provider will need to remove it on your next visit. He or she may also replace it at that time. You may not need antibiotics to treat a simple abscess, unless the infection is spreading into the skin around the wound (cellulitis).  The wound will take about 1 to 2 weeks to heal, depending on the size of the abscess. Healthy tissue will grow from the bottom and sides of the opening until it seals over.  Home care  These tips can help your wound heal:    The wound may drain for the first 2 days. Cover the wound with a clean dry dressing. Change the dressing if it becomes soaked with blood or pus.    If a gauze packing was placed inside the abscess pocket, you may be told to remove it yourself. You may do this in the shower. Once the packing is removed, you should wash the area in the shower, or clean the area as directed by your provider. Continue to do this until the skin opening has closed. Make sure you wash your hands after changing the packing or cleaning the wound.    If you were prescribed antibiotics, take them as directed until they are all gone.    You may use acetaminophen or ibuprofen to control pain, unless another pain medicine was prescribed. If you have liver disease or ever had a stomach ulcer, talk with your doctor before using these medicines.  Follow-up care  Follow up with your healthcare provider, or as advised. If a gauze packing was put in your wound, it should be removed in 1 to 2 days. Check your wound every day for any  signs that the infection is getting worse. The signs are listed below.  When to seek medical advice  Call your healthcare provider right away if any of these occur:    Increasing redness or swelling    Red streaks in the skin leading away from the wound    Increasing local pain or swelling    Continued pus draining from the wound 2 days after treatment    Fever of 100.4 F (38 C) or higher, or as directed by your healthcare provider    Boil returns when you are at home  Date Last Reviewed: 9/1/2016 2000-2017 [employer]. [address] [place], [place] [address]. All rights reserved. This information is not intended as a substitute for professional medical care. Always follow your healthcare professional's instructions.

## 2018-08-03 NOTE — PROGRESS NOTES
"Surgery Progress Note    Pt febrile overnight. precedex stopped. Yeast from sputum gram stain. Wound clean    O: /60  Pulse 102  Temp 99.5  F (37.5  C)  Resp 19  Ht 5' 8\" (1.727 m)  Wt 274 lb 7.6 oz (124.5 kg)  SpO2 99%  BMI 41.73 kg/m2  Right groin: wound base with healthy granulation tissue. Candidiasis surrounding wound    A/P: 36yof with necrotizing soft tissue infection in her right groin. Wound improving.   - continue daily dressing changes per Wound RN recommendations, appreciate assistance  - Agree with attempt to replace vac later in the week if candidiasis cleared  - appreciate intensivist cares, hopefully able to extubate soon    Mary Beth Neal MD  [facility]  [phone]      " Statement Selected

## 2022-03-02 NOTE — IP AVS SNAPSHOT
"[place] [address] [facility]: [phone]                                              INTERAGENCY TRANSFER FORM - PHYSICIAN ORDERS   3/29/2017                    Hospital Admission Date: 3/29/2017  ZAYDA SQUIRES   : 1980  Sex: Female        Attending Provider: Mary Beth Neal MD     Allergies:  Cats, Shellfish-derived Products    Infection:  None   Service:  SURGERY    Ht:  1.727 m (5' 8\")   Wt:  112.4 kg (247 lb 12.8 oz)   Admission Wt:  114.3 kg (252 lb)    BMI:  37.68 kg/m 2   BSA:  2.32 m 2            Patient PCP Information     Provider PCP Type    [facility]      ED Clinical Impression     Diagnosis Description Comment Added By Time Added    Necrotizing fasciitis (H) [M72.6] Necrotizing fasciitis (H) [M72.6] Shala Cortes MD 3/29/2017 11:30 AM    Hyperglycemia [R73.9] Hyperglycemia [R73.9]  Shala Marte MD 3/29/2017 11:30 AM      Hospital Problems as of 2017              Priority Class Noted POA    Necrotizing fasciitis (H) Medium  3/29/2017 Yes    Necrotizing soft tissue infection (H) Medium  3/29/2017 Yes    Type 2 diabetes mellitus without complication (H) Medium  2017 Unknown      Non-Hospital Problems as of 2017     None      Code Status History     Date Active Date Inactive Code Status Order ID Comments User Context    3/29/2017  5:21 PM 3/29/2017  5:22 PM Full Code [id number]  Rosario Ward PA-C Inpatient         Medication Review      START taking        Dose / Directions Comments    amLODIPine 10 MG tablet   Commonly known as:  NORVASC   Used for:  Essential hypertension        Dose:  10 mg   Start taking on:  2017   Take 1 tablet (10 mg) by mouth daily   Quantity:  30 tablet   Refills:  1        insulin glargine 100 UNIT/ML injection   Commonly known as:  LANTUS SOLOSTAR        Dose:  8 Units   Inject 8 Units Subcutaneous At Bedtime Lantus Solostar Pen   Quantity:  15 mL   Refills:  0        metoprolol 25 MG tablet " [facility] MEDICARE ADVANTAGE / Plan: [facility] MEDICARE ADVANTAGE DAT Commonly known as:  LOPRESSOR   Used for:  Essential hypertension        Dose:  25 mg   Take 1 tablet (25 mg) by mouth 2 times daily   Quantity:  60 tablet   Refills:  1        * order for DME        Equipment being ordered: Walker Wheels () and Walker () Treatment Diagnosis: impaired gait stability   Quantity:  1 each   Refills:  0        * order for DME        Equipment being ordered: Other: Glucometer Treatment Diagnosis: Diabetes Needs testing strips for testing three times daily and control solution   Quantity:  1 Device   Refills:  0        oxyCODONE-acetaminophen 5-325 MG per tablet   Commonly known as:  PERCOCET        Dose:  1-2 tablet   Take 1-2 tablets by mouth every 4 hours as needed for moderate to severe pain   Quantity:  20 tablet   Refills:  0        * Notice:  This list has 2 medication(s) that are the same as other medications prescribed for you. Read the directions carefully, and ask your doctor or other care provider to review them with you.      STOP taking     ALEVE PO           TYLENOL PO                     Further instructions from your care team       Rt groin I & D site:  (dressing last changed on 4-11-17)  [employer] Wound VAC  Change dressing on M-W-F and prn  Vac @ 125mm/hg cont suction  Pre-medicate pt with oral pain meds prior to dressing change    Supplies:   Medium black sponge kit  Antifungal powder  Skin prep  Wound cleanser    Procedure:    1.      Clean wound MicroKlenz   2.     Antifungal powder  (Nystatin Powder or Desenex) to emre-wound skin. Dust off excess   3.     Sealed in with skin prep   4.     Black  sponge to tunnel and undermining   5.     Black sponge x 2 to fill in remainder wound bed   6.     Sealed with Vac drape   7.       Adaptor bridged and and padded on abd wall    Home Care provided by:[facility]  Phone [phone]  Fax#[phone]    Follow up appointment with Dr. Hedrick on Friday, April 14th at 4:15pm for a 4:30pm appointment at your [facility]. [phone]      Summary of Visit     Reason for your hospital stay       Infection of your right groin, diabetes, high blood pressure             After Care     Activity       Your activity upon discharge: activity as tolerated. No heavy lifting > 20 lbs or strenuous exercise x 2-3 weeks. No driving or alcohol while on pain meds.       Diet       Follow this diet upon discharge: moderate carbohydrate diet       Discharge Equipment: Wound Vac       Negative Pressure Wound Therapy to r groin wound with continuous suction.     Cleanse wound with saline or wound cleanser. Dressing change 3 times per week. Change canister weekly and when full.     Supplies: black foam    Teach pt/family how to remove dressing and apply wet to damp dressing, in the event that dressing leaks or machine malfunctions.  Consult [facility] nurse.      Follow-up for wound care in 2 weeks with Dr. Neal.       Wound care and dressings       Instructions to care for your wound at home: Wound vac changes Monday, Wednesday and Friday             Referrals     Home Care PT Referral for Hospital Discharge       PT to eval and treat    Your provider has ordered home care - physical therapy. If you have not been contacted within 2 days of your discharge please call the department phone number listed on the top of this document.       Home care nursing referral       RN skilled nursing visit. RN to provide wound vac dressing changes, see directions included. Assess ability to manage new diagnosis of DM.    Your provider has ordered home care nursing services. If you have not been contacted within 2 days of your discharge please call the inpatient department phone number at [phone] .       Wound Care Referral       Wound vac placement for thigh wound. Evaluate and treat as per protocol and see other vac/wound care orders. Thank you.             Your next 10 appointments already scheduled     Apr 13, 2017  9:20 AM ÁLVARO    Office Visit with Crissy Otero MD   [facility]    [address] [address] [phone]           Bring a current list of meds and any records pertaining to this visit.  For Physicals, please bring immunization records and any forms needing to be filled out.  Please arrive 10 minutes early to complete paperwork.              Follow-Up Appointment Instructions     Future Labs/Procedures    Follow-up and recommended labs and tests      Comments:    Follow up with Mary Beth li, Friday, April 21st for wound check. Please call [phone] to schedule this appt. Please bring wound vac supplies with you if possible.      Follow-Up Appointment Instructions     Follow-up and recommended labs and tests        Follow up with Mary Beth li, Friday, April 21st for wound check. Please call [phone] to schedule this appt. Please bring wound vac supplies with you if possible.             Statement of Approval     Ordered          04/12/17 2216  I have reviewed and agree with all the recommendations and orders detailed in this document.  EFFECTIVE NOW     Approved and electronically signed by:  Jama Milligan MD

## 2023-11-08 NOTE — PHARMACY-VANCOMYCIN DOSING SERVICE
[address] ESTABLISHED VISIT    SUBJECTIVE:    Alexandria Mccord is a 25 year old female with the below mentioned history who visits the clinic today for   Chief Complaint   Patient presents with   • Office Visit     Ankle:   - Continues to have pain with some movements   - 6 weeks ago    Left Elbow SKin Tag:   - Hurts to lean on it   - Had one in HS and cut it off     REVIEW OF SYSTEMS:    A complete 12 point review of systems is negative except that which is highlighted in the above HPI.     PROBLEM LIST:    Patient Active Problem List   Diagnosis   • Dermatosis   • Depression   • Irritable bowel syndrome        PAST HISTORIES:     I have reviewed the pertinent past medical history, family history, social history, medications and allergies listed in the medical record as obtained by my nursing staff and support staff and agree with their documentation.    OBJECTIVE:    PHYSICAL EXAM:   Vital Signs:    Visit Vitals  /79   Pulse 69   Temp 98 °F (36.7 °C) (Oral)   Ht 5' 8\" (1.727 m)   Wt 77.7 kg (171 lb 4.8 oz)   LMP 11/01/2023 (Approximate)   SpO2 98%   BMI 26.05 kg/m²     General:  Alert, cooperative, conversive, in no acute distress.  Head:  Normocephalic atraumatic.   Neck:  Trachea is midline. No adenopathy.  Eyes:  Normal conjunctivae and sclerae.  Pupils equal and round.  Extraocular movements intact.    ENT:   Mucous membranes are moist.   Respiratory:   Normal respiratory effort.  No obvious wheezing.  Gastrointestinal:  No obvious organomegaly  Extremities:  No deformity or edema.  Right ankle with posterior/inferior lateral malleolus swelling, no bruising, negative anterior drawer and inversion laxity tests.     Skin:  Exposed skin warm and dry without rash.  Left lateral elbow with a 1cm raised wart, 2 small <2mm warts just distal to this   Neurologic:   Oriented x3.  Cranial nerves II-XII are grossly intact.  No focal deficits or lateralizing signs.    Psych: Normal  Pharmacy Vancomycin Initial Note  Date of Service 2017  Patient's  1980  36 year old, female    Indication: Skin and Soft Tissue Infection, necrotizing fasciitis.    Current estimated CrCl = Estimated Creatinine Clearance: 174.6 mL/min (based on Cr of 0.58).    Creatinine for last 3 days  3/29/2017: 10:00 AM Creatinine 0.66 mg/dL;  3:15 PM Creatinine 0.58 mg/dL    Recent Vancomycin Level(s) for last 3 days  No results found for requested labs within last 72 hours.      Vancomycin IV Administrations (past 72 hours)                   vancomycin (VANCOCIN) 2,000 mg in NaCl 0.9 % 500 mL intermittent infusion (mg) 2,000 mg New Bag 17 1201                Nephrotoxins and other renal medications (Future)    Start     Dose/Rate Route Frequency Ordered Stop    17 2000  vancomycin (VANCOCIN) 1500 mg in 0.9% NaCl 250 mL PREMIX      1,500 mg Intravenous EVERY 8 HOURS 17 1753      17 1800  piperacillin-tazobactam (ZOSYN) 3.375 g vial to attach to  mL bag     Comments:  Pharmacy can adjust dose based on renal function.    3.375 g  over 1 Hours Intravenous EVERY 6 HOURS 17 1721      17 1730  norepinephrine (LEVOPHED) 16 mg in D5W 250 mL infusion      0.03-0.4 mcg/kg/min × 114.3 kg  3.2-42.9 mL/hr  Intravenous CONTINUOUS 17 1721            Contrast Orders - past 72 hours (72h ago through future)    Start     Dose/Rate Route Frequency Ordered Stop    17 1049  iopamidol (ISOVUE-370) solution 123 mL      123 mL Intravenous ONCE 17 1049 17 1106                Plan:  1.  Start vancomycin  1500 mg IV q8h, (pt already received 2 gm dose in ED)  2.  Goal Trough Level: 15-20 mg/L until condition improving, then will lower goal to 10-15.  3.  Pharmacy will check trough levels as appropriate in 1-3 Days.    4. Serum creatinine levels will be ordered daily for the first week of therapy and at least twice weekly for subsequent weeks.    5. [place] method  affect and judgement     ASSESSMENT & PLAN:    Sprain of posterior talofibular ligament of right ankle, subsequent encounter  Seems to be healing although slow. I suspect rehab window was missed for optimal healing time but no red flags today to warrant imaging.   Recommended stopping air brace and provided form fit brace for activity.   - FORM FIT ANKLE BRACE WITH FIGURE 8 STRAPS OFF THE SHELF   - Ankle exercises taught today   - FU in 6 weeks and if not healing as expected might consider MRI and referral vs PT    Other viral warts  Elbow, painful for patient    Shave Biopsy Procedure Note:  Indication: Painful Warts.    Risks and Benefits:  Possible treatment options were discussed with the patient, including the benefits and risks of the procedure.  The patient elected to proceed.    Procedure: The patient was appropriately positioned.  The site was prepped with alcohol. Local anesthesia was obtained by infiltration using 2% Lidocaine with epinephrine.  Using sterile technique a shave excision of the lesion was performed flush with the surrounding skin.  Hemostasis was obtained using pressure cautery.  A sterile dressing was applied.      The patient tolerated the procedure well, without difficulty.    Complications: None.     Specimen Disposition: The specimen was not submitted for pathological evaluation.    Wart Removal with Liquid Nitrogen Procedure Note:  Indication:  Wart removal, location:  Left Elbow.    Risks and Benefits:  Possible treatment options were discussed with the patient, including the benefits and risks of the procedure.  The patient elected to proceed.    Procedure:  The patient was appropriately positioned.  Cryotherapy was performed using a freeze-thaw cycle for 30 seconds for a total of 3 applications.  The patient tolerated the procedure very well.  A Band-Aid was applied to cover the wound.    The patient tolerated the procedure well, without difficulty.    Complications:  None.   utilized to dose vancomycin therapy: Method 1    Negrita Crowell           Follow Up:  Return in about 6 weeks (around 12/20/2023) for Follow up for ankle.    TIME:  30 minutes were spent for this encounter today, including pre-charting, note writing, face-to-face time, and/or care coordination.    The patient agreed to and expressed understanding of the assessment and plan and will call or message me with any questions or concerns.    Faisal PALMA) DO [facility]  11/9/2023

## 2025-07-29 NOTE — PLAN OF CARE
Problem: Goal Outcome Summary  Goal: Goal Outcome Summary  Outcome: Improving  Possible discharge pending HH set up & wound vac arrival.  Insulin teaching done & pt states she is comfortable doing to at home.        [FreeTextEntry1] : UPT was negative. Will draw HCG level.   Hormone panel also drawn to assess for hormonal causes that could be causing her irregular periods.   F/u annually or as needed.

## (undated) DEVICE — SU VICRYL 3-0 SH 27" J316H

## (undated) DEVICE — SUCTION CANISTER MEDIVAC LINER 3000ML W/LID 65651-530

## (undated) DEVICE — SYR BULB IRRIG 50ML LATEX FREE 0035280

## (undated) DEVICE — GLOVE PROTEXIS BLUE W/NEU-THERA 6.5  2D73EB65

## (undated) DEVICE — DRSG WOUND VAC GRANUFOAM LG BLACK 26X15CM M8275053/5

## (undated) DEVICE — SYR 20ML SLIP TIP W/O NDL 302831

## (undated) DEVICE — PACK MINOR SBA15MIFSE

## (undated) DEVICE — GLOVE PROTEXIS BLUE W/NEU-THERA 7.0  2D73EB70

## (undated) DEVICE — NDL 19GA 1.5"

## (undated) DEVICE — GLOVE PROTEXIS POWDER FREE 8.0 ORTHOPEDIC 2D73ET80

## (undated) DEVICE — DRAPE LAP W/ARMBOARD 29410

## (undated) DEVICE — GLOVE PROTEXIS MICRO 6.0  2D73PM60

## (undated) DEVICE — PREP CHLORAPREP 26ML TINTED ORANGE  260815

## (undated) DEVICE — DRSG ABDOMINAL 12X16"

## (undated) DEVICE — GLOVE PROTEXIS W/NEU-THERA 6.0  2D73TE60

## (undated) DEVICE — SYR 10ML SLIP TIP W/O NDL

## (undated) DEVICE — CANISTER WOUND VAC W/GEL 500ML M8275063/5

## (undated) DEVICE — SOL NACL 0.9% IRRIG 1000ML BOTTLE 07138-09

## (undated) DEVICE — GLOVE PROTEXIS W/NEU-THERA 6.5  2D73TE65

## (undated) DEVICE — SUCTION TIP YANKAUER W/O VENT K86

## (undated) DEVICE — PREP SKIN SCRUB TRAY 4461A

## (undated) DEVICE — SPECIMEN CULTURETTE DBL SWAB 220109

## (undated) DEVICE — DECANTER VIAL 2006S

## (undated) DEVICE — LINEN TOWEL PACK X5 5464

## (undated) DEVICE — SYR EAR BULB 3OZ 0035830

## (undated) DEVICE — GLOVE PROTEXIS POWDER FREE 7.0 ORTHOPEDIC 2D73ET70

## (undated) DEVICE — KIT WOUND VAC VIA SYSTEM STARTER VIAKIT077D01

## (undated) DEVICE — ESU ELEC BLADE 2.75" COATED/INSULATED E1455

## (undated) DEVICE — BLADE KNIFE SURG 10 371110

## (undated) DEVICE — SPONGE LAP 18X18" X8435

## (undated) DEVICE — DRSG KERLIX 4 1/2"X4YDS ROLL 6715

## (undated) DEVICE — DRSG KERLIX FLUFFS X5

## (undated) RX ORDER — PROPOFOL 10 MG/ML
INJECTION, EMULSION INTRAVENOUS
Status: DISPENSED
Start: 2017-03-29

## (undated) RX ORDER — DIPHENHYDRAMINE HYDROCHLORIDE 50 MG/ML
INJECTION INTRAMUSCULAR; INTRAVENOUS
Status: DISPENSED
Start: 2017-03-29

## (undated) RX ORDER — GLYCOPYRROLATE 0.2 MG/ML
INJECTION, SOLUTION INTRAMUSCULAR; INTRAVENOUS
Status: DISPENSED
Start: 2017-03-29

## (undated) RX ORDER — ALBUTEROL SULFATE 90 UG/1
AEROSOL, METERED RESPIRATORY (INHALATION)
Status: DISPENSED
Start: 2017-03-29

## (undated) RX ORDER — FENTANYL CITRATE 50 UG/ML
INJECTION, SOLUTION INTRAMUSCULAR; INTRAVENOUS
Status: DISPENSED
Start: 2017-03-29

## (undated) RX ORDER — ETOMIDATE 2 MG/ML
INJECTION INTRAVENOUS
Status: DISPENSED
Start: 2017-03-29